# Patient Record
Sex: FEMALE | Race: WHITE | NOT HISPANIC OR LATINO | Employment: OTHER | URBAN - NONMETROPOLITAN AREA
[De-identification: names, ages, dates, MRNs, and addresses within clinical notes are randomized per-mention and may not be internally consistent; named-entity substitution may affect disease eponyms.]

---

## 2017-03-24 ENCOUNTER — OFFICE VISIT (OUTPATIENT)
Dept: MEDICAL GROUP | Facility: PHYSICIAN GROUP | Age: 67
End: 2017-03-24

## 2017-03-24 VITALS
DIASTOLIC BLOOD PRESSURE: 90 MMHG | OXYGEN SATURATION: 96 % | HEART RATE: 78 BPM | BODY MASS INDEX: 29.88 KG/M2 | HEIGHT: 64 IN | WEIGHT: 175 LBS | SYSTOLIC BLOOD PRESSURE: 126 MMHG | TEMPERATURE: 98.4 F | RESPIRATION RATE: 18 BRPM

## 2017-03-24 DIAGNOSIS — Z12.11 COLON CANCER SCREENING: ICD-10-CM

## 2017-03-24 DIAGNOSIS — E66.9 OBESITY (BMI 30-39.9): ICD-10-CM

## 2017-03-24 DIAGNOSIS — Z00.00 HEALTH CARE MAINTENANCE: ICD-10-CM

## 2017-03-24 DIAGNOSIS — I25.2 HISTORY OF MI (MYOCARDIAL INFARCTION): ICD-10-CM

## 2017-03-24 DIAGNOSIS — I10 ESSENTIAL HYPERTENSION: ICD-10-CM

## 2017-03-24 DIAGNOSIS — Z12.31 ENCOUNTER FOR SCREENING MAMMOGRAM FOR BREAST CANCER: ICD-10-CM

## 2017-03-24 DIAGNOSIS — E78.2 MIXED HYPERLIPIDEMIA: ICD-10-CM

## 2017-03-24 PROCEDURE — 99214 OFFICE O/P EST MOD 30 MIN: CPT | Performed by: NURSE PRACTITIONER

## 2017-03-24 RX ORDER — SIMVASTATIN 80 MG
80 TABLET ORAL DAILY
Qty: 90 TAB | Refills: 3 | Status: SHIPPED | OUTPATIENT
Start: 2017-03-24 | End: 2018-04-10 | Stop reason: SDUPTHER

## 2017-03-24 RX ORDER — BISOPROLOL FUMARATE AND HYDROCHLOROTHIAZIDE 10; 6.25 MG/1; MG/1
1 TABLET ORAL DAILY
Qty: 90 TAB | Refills: 3 | Status: SHIPPED | OUTPATIENT
Start: 2017-03-24 | End: 2018-04-10 | Stop reason: SDUPTHER

## 2017-03-24 ASSESSMENT — PATIENT HEALTH QUESTIONNAIRE - PHQ9: CLINICAL INTERPRETATION OF PHQ2 SCORE: 0

## 2017-03-24 ASSESSMENT — PAIN SCALES - GENERAL: PAINLEVEL: NO PAIN

## 2017-03-24 NOTE — MR AVS SNAPSHOT
"Vane Ritter   3/24/2017 11:20 AM   Office Visit   MRN: [id number]    Department:  [facility]   Dept Phone:  [phone]    Description:  Female : 1950   Provider:  FUAD Eller           Reason for Visit     New Patient est care       Allergies as of 3/24/2017     Allergen Noted Reactions    Lisinopril 2017   Cough      You were diagnosed with     Essential hypertension   [0036956]       Mixed hyperlipidemia   [272.2.ICD-9-CM]       Obesity (BMI 30-39.9)   [803206]       Health care maintenance   [623216]       Encounter for screening mammogram for breast cancer   [4049373]       Colon cancer screening   [061661]       History of MI (myocardial infarction)   [399540]         Vital Signs     Blood Pressure Pulse Temperature Respirations Height Weight    126/90 mmHg 78 36.9 °C (98.4 °F) 18 1.626 m (5' 4\") 79.379 kg (175 lb)    Body Mass Index Oxygen Saturation Smoking Status             30.02 kg/m2 96% Current Some Day Smoker         Basic Information     Date Of Birth Sex Race Ethnicity Preferred Language    1950 Female White Non- English      Problem List              ICD-10-CM Priority Class Noted - Resolved    Essential hypertension I10   3/24/2017 - Present    Mixed hyperlipidemia E78.2   3/24/2017 - Present    Obesity (BMI 30-39.9) E66.9   3/24/2017 - Present    Health care maintenance Z00.00   3/24/2017 - Present    History of MI (myocardial infarction) I25.2   3/24/2017 - Present      Health Maintenance        Date Due Completion Dates    IMM DTaP/Tdap/Td Vaccine (1 - Tdap) 1971 ---    PAP SMEAR 1973 ---    MAMMOGRAM 1992 ---    COLONOSCOPY 2002 ---    IMM ZOSTER VACCINE 2012 ---    IMM INFLUENZA (1) 2016 ---            Current Immunizations     No immunizations on file.      Below and/or attached are the medications your provider expects you to take. Review all of your home medications and newly ordered medications " with your provider and/or pharmacist. Follow medication instructions as directed by your provider and/or pharmacist. Please keep your medication list with you and share with your provider. Update the information when medications are discontinued, doses are changed, or new medications (including over-the-counter products) are added; and carry medication information at all times in the event of emergency situations     Allergies:  LISINOPRIL - Cough               Medications  Valid as of: March 24, 2017 - 11:50 AM    Generic Name Brand Name Tablet Size Instructions for use    Albuterol Sulfate (Aero Soln) albuterol 108 (90 BASE) MCG/ACT Inhale 2 Puffs by mouth every four hours as needed for Shortness of Breath.        Aspirin (Tab)  MG Take 325 mg by mouth every 6 hours as needed.        Bisoprolol-Hydrochlorothiazide (Tab) ZIAC 10-6.25 MG Take 1 Tab by mouth every day.        Diclofenac Sodium (Tablet Delayed Response) VOLTAREN 75 MG Take 1 Tab by mouth 2 times a day.        Diclofenac Sodium (Gel) Diclofenac Sodium 1 % Apply 2 g to skin as directed 4 times a day.        Simvastatin (Tab) ZOCOR 80 MG Take 1 Tab by mouth every day.        .                 Medicines prescribed today were sent to:     [facility] [address] [place] [address] [address] [facility]    [address] [facility] [address]    Phone: [phone] Fax: [phone]    Open 24 Hours?: No      Medication refill instructions:       If your prescription bottle indicates you have medication refills left, it is not necessary to call your provider’s office. Please contact your pharmacy and they will refill your medication.    If your prescription bottle indicates you do not have any refills left, you may request refills at any time through one of the following ways: The online [employer] system (except Urgent Care), by calling your provider’s office, or by asking your pharmacy to contact your provider’s office with a refill request.  Medication refills are processed only during regular business hours and may not be available until the next business day. Your provider may request additional information or to have a follow-up visit with you prior to refilling your medication.   *Please Note: Medication refills are assigned a new Rx number when refilled electronically. Your pharmacy may indicate that no refills were authorized even though a new prescription for the same medication is available at the pharmacy. Please request the medicine by name with the pharmacy before contacting your provider for a refill.        Your To Do List     Future Labs/Procedures Complete By Expires    MA-SCREEN MAMMO W/CAD-BILAT  9/20/2017 3/24/2018    OCCULT BLOOD FECES IMMUNOASSAY  9/20/2017 3/25/2018    COMP METABOLIC PANEL  As directed 3/25/2018    LIPID PROFILE  As directed 3/25/2018         Sendio Access Code: [email or web address]  Expires: 4/23/2017 11:50 AM    Your email address is not on file at [facility].  Email Addresses are required for you to sign up for [employer], please contact [phone] to verify your personal information and to provide your email address prior to attempting to register for Sendio.    Vane Ritter  [address] [place] [id number]    Sendio  A secure, online tool to manage your health information     [employer]’s Sendio® is a secure, online tool that connects you to your personalized health information from the privacy of your home -- day or night - making it very easy for you to manage your healthcare. Once the activation process is completed, you can even access your medical information using the Sendio frieda, which is available for free in the Apple [employer] store or Google Play store.     To learn more about Sendio, visit [email or web address]    There are two levels of access available (as shown below):   My Chart Features  [facility] Doctor [facility]  Specialists [facility]  Urgent  Care Non-[facility]  Doctor   Email your healthcare team securely and privately 24/7 X X X    Manage appointments: schedule your next appointment; view details of past/upcoming appointments X      Request prescription refills. X      View recent personal medical records, including lab and immunizations X X X X   View health record, including health history, allergies, medications X X X X   Read reports about your outpatient visits, procedures, consult and ER notes X X X X   See your discharge summary, which is a recap of your hospital and/or ER visit that includes your diagnosis, lab results, and care plan X X  X     How to register for [employer]:  Once your e-mail address has been verified, follow the following steps to sign up for [employer].     1. Go to  [email or web address]  2. Click on the Sign Up Now box, which takes you to the New Member Sign Up page. You will need to provide the following information:  a. Enter your [employer] Access Code exactly as it appears at the top of this page. (You will not need to use this code after you’ve completed the sign-up process. If you do not sign up before the expiration date, you must request a new code.)   b. Enter your date of birth.   c. Enter your home email address.   d. Click Submit, and follow the next screen’s instructions.  3. Create a [employer] ID. This will be your [employer] login ID and cannot be changed, so think of one that is secure and easy to remember.  4. Create a [employer] password. You can change your password at any time.  5. Enter your Password Reset Question and Answer. This can be used at a later time if you forget your password.   6. Enter your e-mail address. This allows you to receive e-mail notifications when new information is available in [employer].  7. Click Sign Up. You can now view your health information.    For assistance activating your [employer] account, call [phone]         Quit Tobacco Information     Do you want to quit using tobacco?    Quitting tobacco  decreases risks of cancer, heart and lung disease, increases life expectancy, improves sense of taste and smell, and increases spending money, among other benefits.    If you are thinking about quitting, we can help.  • [facility] Quit Tobacco Program: 678.600.2723  o Program occurs weekly for four weeks and includes pharmacist consultation on products to support quitting smoking or chewing tobacco. A provider referral is needed for pharmacist consultation.  • Tobacco Users Help Hotline: 1-393-QUIT-NOW (241-6908) or https://[place].quitlogix.org/  o Free, confidential telephone and online coaching for [place] residents. Sessions are designed on a schedule that is convenient for you. Eligible clients receive free nicotine replacement therapy.  • Nationally: www.smokefree.gov  o Information and professional assistance to support both immediate and long-term needs as you become, and remain, a non-smoker. Smokefree.gov allows you to choose the help that best fits your needs.

## 2017-03-24 NOTE — ASSESSMENT & PLAN NOTE
Mammogram last done 2 years ago and normal. Due for colonoscopy, no rectal bleeding, no FH colon cancer   Due for PAP, shingles vaccine, Tdap  Patient does not have insurance, she would like to hold off on any additional screenings/testing until September 2017 as she will have insurance coverage of time.

## 2017-03-24 NOTE — ASSESSMENT & PLAN NOTE
Patient continues on simvastatin 80 mg daily, she denies ever spikes this medication. She is due for fasting labs. She does need a refill today.

## 2017-03-24 NOTE — PROGRESS NOTES
[facility]  Primary Care Office Visit - Problem-Oriented        History:     Vane Ritter is a 66 y.o. female who is here today to discuss New Patient    Essential hypertension  This is a 66-year-old female with hypertension, hyperlipidemia, history of MI requiring stent to RCA in 2009.     She continues on bisoprolol-hydrochlorothiazide 10-6 0.25 mg daily with great control of her blood pressure, her blood pressure is 126/90 in the office today. She denies chest pain, shortness of breath, change in vision, headaches. She denies AE of this medication. She is due for labs.    Health care maintenance  Mammogram last done 2 years ago and normal. Due for colonoscopy, no rectal bleeding, no FH colon cancer   Due for PAP, shingles vaccine, Tdap  Patient does not have insurance, she would like to hold off on any additional screenings/testing until September 2017 as she will have insurance coverage of time.     History of MI (myocardial infarction)  As intended this is a 66-year-old female with history of MI requiring PCI and stent placement to M-RCA in 2009. She continues on daily ASA. She is chest pain-free. She is also on high-dose statin without adverse effect, she is due for fasting labs. She is able to do heavy housework without chest pain, syncope, palpitations. She does exercise regularly. She does not smoke.      Mixed hyperlipidemia  Patient continues on simvastatin 80 mg daily, she denies ever spikes this medication. She is due for fasting labs. She does need a refill today.        Past Medical History   Diagnosis Date   • Heart attack (CMS-HCC)      2009      Past Surgical History   Procedure Laterality Date   • Cyst excision       right ankle, RA nodule    • Stent placement       2009   • Open reduction     • Tubal coagulation laparoscopic bilateral       Social History     Social History   • Marital Status:      Spouse Name: N/A   • Number of Children: N/A   • Years of Education: N/A  "    Occupational History   • Not on file.     Social History Main Topics   • Smoking status: Current Some Day Smoker   • Smokeless tobacco: Never Used   • Alcohol Use: Yes      Comment: rare   • Drug Use: No   • Sexual Activity: Not on file     Other Topics Concern   • Not on file     Social History Narrative     History   Smoking status   • Current Some Day Smoker   Smokeless tobacco   • Never Used     History reviewed. No pertinent family history.  Allergies   Allergen Reactions   • Lisinopril Cough       Problem List:     Patient Active Problem List    Diagnosis Date Noted   • Essential hypertension 03/24/2017   • Mixed hyperlipidemia 03/24/2017   • Obesity (BMI 30-39.9) 03/24/2017   • Health care maintenance 03/24/2017   • History of MI (myocardial infarction) 03/24/2017         Medications:     Current outpatient prescriptions:   •  bisoprolol-hydrochlorothiazide (ZIAC) 10-6.25 MG per tablet, Take 1 Tab by mouth every day., Disp: 90 Tab, Rfl: 3  •  simvastatin (ZOCOR) 80 MG tablet, Take 1 Tab by mouth every day., Disp: 90 Tab, Rfl: 3  •  albuterol (VENTOLIN OR PROVENTIL) 108 (90 BASE) MCG/ACT AERS inhalation aerosol, Inhale 2 Puffs by mouth every four hours as needed for Shortness of Breath., Disp: 1 Inhaler, Rfl: 0  •  aspirin (ASA) 325 MG TABS, Take 325 mg by mouth every 6 hours as needed., Disp: , Rfl:   •  diclofenac EC (VOLTAREN) 75 MG Tablet Delayed Response, Take 1 Tab by mouth 2 times a day., Disp: 60 Tab, Rfl: 3  •  Diclofenac Sodium (VOLTAREN) 1 % Gel, Apply 2 g to skin as directed 4 times a day., Disp: 100 g, Rfl: 0      Review of Systems:     Complains of review of systems negative.    Physical Assessment:     VS: /90 mmHg  Pulse 78  Temp(Src) 36.9 °C (98.4 °F)  Resp 18  Ht 1.626 m (5' 4\")  Wt 79.379 kg (175 lb)  BMI 30.02 kg/m2  SpO2 96%    General: Well-developed, well-nourished, female     Head: PERRL, EOMI. Normocephalic. No facial asymmetry noted.  Cardiovasc:No JVD.  RRR, no MRG. " No thrills or bruits. Pulses 2+ and symmetric at all distal extremities.  Pulmonary: Lungs clear bilaterally.  Normal respiratory effort. No wheeze or crackles.   Abdomen: Abdomen soft, NT, ND, NAB.  No masses or organomegaly.  Extremities: No edema, no TTP bilateral calves. Pedal pulses intact. No joint effusions. LEs warm and well-perfused.  Neuro: Alert and oriented, CNs II-XII intact, no focal deficits.  Skin:No rashes noted. Skin warm, dry, intact    Lymph: No cervical, pre- or post-auricular, submandibular, occipital lymphadenopathy.    Psych: Dressed appropriately for the weather, pleasant and conversant.  Affect, mood & judgment appropriate.    Assessment/Plan:   Vane was seen today for new patient.    Diagnoses and all orders for this visit:    Essential hypertension, chronic, controlled on present treatment  -     COMP METABOLIC PANEL; Future  -     bisoprolol-hydrochlorothiazide (ZIAC) 10-6.25 MG per tablet; Take 1 Tab by mouth every day.    Mixed hyperlipidemia, chronic, unclear control   -     LIPID PROFILE; Future  -     simvastatin (ZOCOR) 80 MG tablet; Take 1 Tab by mouth every day.    Obesity (BMI 30-39.9)  -     Patient identified as having weight management issue.  Appropriate orders and counseling given.    Encounter for screening mammogram for breast cancer  -     MA-SCREEN MAMMO W/CAD-BILAT; Future    Colon cancer screening  -     OCCULT BLOOD FECES IMMUNOASSAY; Future    History of MI (myocardial infarction)  - patient remains CP free, she continues on daily ASA 81mg    Patient is agreeable to the above plan and voiced understanding. All questions answered.     Please note that this dictation was created using voice recognition software. I have made every reasonable attempt to correct obvious errors, but I expect that there are errors of grammar and possibly content that I did not discover before finalizing the note.      JUAN RAMON Garcia  3/24/2017, 11:51 AM

## 2017-03-24 NOTE — ASSESSMENT & PLAN NOTE
As intended this is a 66-year-old female with history of MI requiring PCI and stent placement to M-RCA in 2009. She continues on daily ASA. She is chest pain-free. She is also on high-dose statin without adverse effect, she is due for fasting labs. She is able to do heavy housework without chest pain, syncope, palpitations. She does exercise regularly. She does not smoke.

## 2017-03-24 NOTE — ASSESSMENT & PLAN NOTE
This is a 66-year-old female with hypertension, hyperlipidemia, history of MI requiring stent to RCA in 2009.     She continues on bisoprolol-hydrochlorothiazide 10-6 0.25 mg daily with great control of her blood pressure, her blood pressure is 126/90 in the office today. She denies chest pain, shortness of breath, change in vision, headaches. She denies AE of this medication. She is due for labs.

## 2018-04-10 ENCOUNTER — OFFICE VISIT (OUTPATIENT)
Dept: MEDICAL GROUP | Facility: PHYSICIAN GROUP | Age: 68
End: 2018-04-10
Payer: MEDICARE

## 2018-04-10 VITALS
OXYGEN SATURATION: 97 % | HEIGHT: 64 IN | RESPIRATION RATE: 16 BRPM | SYSTOLIC BLOOD PRESSURE: 128 MMHG | WEIGHT: 180 LBS | BODY MASS INDEX: 30.73 KG/M2 | HEART RATE: 76 BPM | TEMPERATURE: 97.8 F | DIASTOLIC BLOOD PRESSURE: 90 MMHG

## 2018-04-10 DIAGNOSIS — J45.20 MILD INTERMITTENT ASTHMA WITHOUT COMPLICATION: ICD-10-CM

## 2018-04-10 DIAGNOSIS — Z12.11 SCREEN FOR COLON CANCER: ICD-10-CM

## 2018-04-10 DIAGNOSIS — F41.9 ANXIETY: ICD-10-CM

## 2018-04-10 DIAGNOSIS — Z12.39 SCREENING FOR BREAST CANCER: ICD-10-CM

## 2018-04-10 DIAGNOSIS — Z13.21 ENCOUNTER FOR VITAMIN DEFICIENCY SCREENING: ICD-10-CM

## 2018-04-10 DIAGNOSIS — E78.2 MIXED HYPERLIPIDEMIA: ICD-10-CM

## 2018-04-10 DIAGNOSIS — R06.2 WHEEZE: ICD-10-CM

## 2018-04-10 DIAGNOSIS — I10 ESSENTIAL HYPERTENSION: ICD-10-CM

## 2018-04-10 PROBLEM — Z00.00 HEALTH CARE MAINTENANCE: Status: RESOLVED | Noted: 2017-03-24 | Resolved: 2018-04-10

## 2018-04-10 PROCEDURE — 99214 OFFICE O/P EST MOD 30 MIN: CPT | Performed by: NURSE PRACTITIONER

## 2018-04-10 RX ORDER — ESCITALOPRAM OXALATE 10 MG/1
10 TABLET ORAL DAILY
Qty: 90 TAB | Refills: 0 | Status: SHIPPED | OUTPATIENT
Start: 2018-04-10 | End: 2018-06-14 | Stop reason: SDUPTHER

## 2018-04-10 RX ORDER — BISOPROLOL FUMARATE AND HYDROCHLOROTHIAZIDE 10; 6.25 MG/1; MG/1
TABLET ORAL
Qty: 135 TAB | Refills: 0 | Status: SHIPPED | OUTPATIENT
Start: 2018-04-10 | End: 2018-06-14 | Stop reason: SDUPTHER

## 2018-04-10 RX ORDER — ALBUTEROL SULFATE 90 UG/1
2 AEROSOL, METERED RESPIRATORY (INHALATION) EVERY 4 HOURS PRN
Qty: 1 INHALER | Refills: 0 | Status: SHIPPED | OUTPATIENT
Start: 2018-04-10 | End: 2019-02-06 | Stop reason: CLARIF

## 2018-04-10 RX ORDER — SIMVASTATIN 80 MG
80 TABLET ORAL DAILY
Qty: 90 TAB | Refills: 0 | Status: SHIPPED | OUTPATIENT
Start: 2018-04-10 | End: 2019-01-16 | Stop reason: CLARIF

## 2018-04-10 ASSESSMENT — PATIENT HEALTH QUESTIONNAIRE - PHQ9: CLINICAL INTERPRETATION OF PHQ2 SCORE: 0

## 2018-04-10 NOTE — ASSESSMENT & PLAN NOTE
Patient reports that she gets anxious and irritable in caring for her invalid .  He is requiring multiple trips to [place] for care and is not always compliant.  Patient reports that she deals with frustration and anxiety by smoking.  She denies depression.  We discussed options such as counseling, caregiver support group, SSRI's, other resources for spouse's care.  Patient reports she has no time for counseling or support group.  She would like to trial SSRI. We reviewed that medication takes 4 to 6 weeks to reach therapeutic effect, and that side effects of headache and nausea are temporary lasting about 2 weeks.  Patient will follow-up in clinic in 6 weeks for anxiety.

## 2018-04-10 NOTE — ASSESSMENT & PLAN NOTE
Patient continues on simvstatin 80 mg daily.  She has not gotten lab work done.  She will get updated lipid profile.  Will refill for now.

## 2018-04-10 NOTE — ASSESSMENT & PLAN NOTE
Patient reports that she uses albuterol inhaler occasionally for cough.  Usually has cough during temperature change.  Last albuterol inhaler lasted 3 years.  Denies current cough, wheezing, dyspnea

## 2018-04-10 NOTE — PROGRESS NOTES
CC:  Hypertension, asthma    HISTORY OF THE PRESENT ILLNESS: Patient is a 67 y.o. female. This pleasant patient is here today for hypertension and intermittent asthma.      Essential hypertension  This is a chronic health problem that is moderately controlled with current medications and lifestyle measures.  Blood pressure today is 120/90.  Patient reports blood pressure readings at home are often 150's/90's.  She takes bisoprolol-hydrochlororthiazide 10-6.25 mg daily.  The patient denies chest pain, shortness of breath, headache, vision changes, or dyspnea on exertion.  She smokes anywhere from 2 cigarettes a day to 1/2 pack a day depending on her stress level.  She will work on smoking cessation.  Will increase medication to 1 and 1/2 tab daily.        Mild intermittent asthma without complication  Patient reports that she uses albuterol inhaler occasionally for cough.  Usually has cough during temperature change.  Last albuterol inhaler lasted 3 years.  Denies current cough, wheezing, dyspnea    Mixed hyperlipidemia  Patient continues on simvstatin 80 mg daily.  She has not gotten lab work done.  She will get updated lipid profile.  Will refill for now.    Anxiety  Patient reports that she gets anxious and irritable in caring for her invalid .  He is requiring multiple trips to [place] for care and is not always compliant.  Patient reports that she deals with frustration and anxiety by smoking.  She denies depression.  We discussed options such as counseling, caregiver support group, SSRI's, other resources for spouse's care.  Patient reports she has no time for counseling or support group.  She would like to trial SSRI. We reviewed that medication takes 4 to 6 weeks to reach therapeutic effect, and that side effects of headache and nausea are temporary lasting about 2 weeks.  Patient will follow-up in clinic in 6 weeks for anxiety.      Allergies: Lisinopril    Current Outpatient Prescriptions Ordered in  Epic   Medication Sig Dispense Refill   • bisoprolol-hydrochlorothiazide (ZIAC) 10-6.25 MG per tablet Take 1 and 1/2 tab daily 135 Tab 0   • albuterol 108 (90 Base) MCG/ACT Aero Soln inhalation aerosol Inhale 2 Puffs by mouth every four hours as needed for Shortness of Breath. 1 Inhaler 0   • escitalopram (LEXAPRO) 10 MG Tab Take 1 Tab by mouth every day. 90 Tab 0   • simvastatin (ZOCOR) 80 MG tablet Take 1 Tab by mouth every day. 90 Tab 0   • aspirin (ASA) 325 MG TABS Take 325 mg by mouth every 6 hours as needed.     • diclofenac EC (VOLTAREN) 75 MG Tablet Delayed Response Take 1 Tab by mouth 2 times a day. 60 Tab 3   • Diclofenac Sodium (VOLTAREN) 1 % Gel Apply 2 g to skin as directed 4 times a day. 100 g 0     No current Epic-ordered facility-administered medications on file.        Past Medical History:   Diagnosis Date   • Heart attack     2009        Past Surgical History:   Procedure Laterality Date   • CYST EXCISION      right ankle, RA nodule    • OPEN REDUCTION     • STENT PLACEMENT      2009   • TUBAL COAGULATION LAPAROSCOPIC BILATERAL         Social History   Substance Use Topics   • Smoking status: Current Some Day Smoker     Packs/day: 0.50     Types: Cigarettes   • Smokeless tobacco: Never Used   • Alcohol use Yes      Comment: rare       No family history on file.    ROS:     - Constitutional: Negative for fever, chills, unexpected weight change, and fatigue/generalized weakness.     - HEENT: Negative for headaches, vision changes, hearing changes, ear pain, rhinorrhea, sinus congestion, and sore throat.        - Cardiovascular: Negative for chest pain and bilateral lower extremity edema.     - Gastrointestinal: Negative for abdominal pain, melena, diarrhea, constipation.     - Genitourinary: Negative for dysuria.    - Skin: Negative for rash.     - Neurological: Negative for dizziness.       - Psychiatric/Behavioral: Negative for depression, suicidal/homicidal ideation.           Exam: Blood  "pressure 128/90, pulse 76, temperature 36.6 °C (97.8 °F), resp. rate 16, height 1.626 m (5' 4\"), weight 81.6 kg (180 lb), SpO2 97 %. Body mass index is 30.9 kg/m².    General: Alert, pleasant, well nourished, well developed female in NAD  HEENT: Normocephalic. Eyes conjunctiva clear lids without ptosis, pupils equal and reactive to light, ears normal shape and contour, canals are clear bilaterally, tympanic membranes are pearly gray with good light reflex, nasal mucosa without erythema and drainage, oropharynx is without erythema, edema or exudates.   Neck: Supple without bruit. Thyroid is not enlarged.  Pulmonary: Clear to ausculation.  Normal effort. No rales, ronchi, or wheezing.  Cardiovascular: Normal rate and rhythm without murmur. Carotid and radial pulses are intact and equal bilaterally.  No lower extremity edema.  Abdomen: Soft, nontender, mildly distended. Normal bowel sounds. Liver and spleen are not palpable  Neurologic: Grossly nonfocal  Lymph: No cervical or supraclavicular lymph nodes are palpable  Skin: Warm and dry.  No obvious lesions.  Musculoskeletal: Normal gait.   Psych: Normal mood and affect. Alert and oriented. Judgment and insight is normal.    Please note that this dictation was created using voice recognition software. I have made every reasonable attempt to correct obvious errors, but I expect that there are errors of grammar and possibly content that I did not discover before finalizing the note.      Assessment/Plan  1. Essential hypertension  Increase medication dose.  Patient will continue to monitor blood pressure at home.  Will bring in readings to next appointment.  Will review lab results at next appointment.  - bisoprolol-hydrochlorothiazide (ZIAC) 10-6.25 MG per tablet; Take 1 and 1/2 tab daily  Dispense: 135 Tab; Refill: 0  - COMP METABOLIC PANEL; Future  - VITAMIN D,25 HYDROXY; Future  - TSH; Future  - FREE THYROXINE; Future  - ESTIMATED GFR; Future    2. Mild intermittent " asthma without complication    - albuterol 108 (90 Base) MCG/ACT Aero Soln inhalation aerosol; Inhale 2 Puffs by mouth every four hours as needed for Shortness of Breath.  Dispense: 1 Inhaler; Refill: 0    3. Mixed hyperlipidemia  Will review lab results at next appointment.  - LIPID PROFILE; Future  - simvastatin (ZOCOR) 80 MG tablet; Take 1 Tab by mouth every day.  Dispense: 90 Tab; Refill: 0    4. Encounter for vitamin deficiency screening  - VITAMIN D,25 HYDROXY; Future    5. Anxiety  Patient will start Lexapro.  Will try to talk with friends at least once a week.  - escitalopram (LEXAPRO) 10 MG Tab; Take 1 Tab by mouth every day.  Dispense: 90 Tab; Refill: 0    6. Screening for breast cancer  Ordered.  - MA-MAMMO SCREENING BILAT W/JEANNE W/CAD; Future    7. Screen for colon cancer  Ordered.  - OCCULT BLOOD FECES IMMUNOASSAY (FIT); Future    Patient will return to clinic in 6 weeks for hypertension and anxiety and to review lab results.

## 2018-04-10 NOTE — ASSESSMENT & PLAN NOTE
This is a chronic health problem that is moderately controlled with current medications and lifestyle measures.  Blood pressure today is 120/90.  Patient reports blood pressure readings at home are often 150's/90's.  She takes bisoprolol-hydrochlororthiazide 10-6.25 mg daily.  The patient denies chest pain, shortness of breath, headache, vision changes, or dyspnea on exertion.  She smokes anywhere from 2 cigarettes a day to 1/2 pack a day depending on her stress level.  She will work on smoking cessation.  Will increase medication to 1 and 1/2 tab daily.

## 2018-06-14 ENCOUNTER — OFFICE VISIT (OUTPATIENT)
Dept: MEDICAL GROUP | Facility: PHYSICIAN GROUP | Age: 68
End: 2018-06-14
Payer: MEDICARE

## 2018-06-14 ENCOUNTER — HOSPITAL ENCOUNTER (OUTPATIENT)
Dept: LAB | Facility: MEDICAL CENTER | Age: 68
End: 2018-06-14
Attending: NURSE PRACTITIONER
Payer: MEDICARE

## 2018-06-14 VITALS
OXYGEN SATURATION: 96 % | HEIGHT: 64 IN | SYSTOLIC BLOOD PRESSURE: 128 MMHG | RESPIRATION RATE: 16 BRPM | HEART RATE: 71 BPM | BODY MASS INDEX: 29.78 KG/M2 | DIASTOLIC BLOOD PRESSURE: 82 MMHG | WEIGHT: 174.4 LBS | TEMPERATURE: 97.6 F

## 2018-06-14 DIAGNOSIS — E78.2 MIXED HYPERLIPIDEMIA: ICD-10-CM

## 2018-06-14 DIAGNOSIS — L82.1 SEBORRHEIC KERATOSES: ICD-10-CM

## 2018-06-14 DIAGNOSIS — M25.561 CHRONIC PAIN OF RIGHT KNEE: ICD-10-CM

## 2018-06-14 DIAGNOSIS — Z23 NEED FOR TDAP VACCINATION: ICD-10-CM

## 2018-06-14 DIAGNOSIS — M75.02 ADHESIVE CAPSULITIS OF LEFT SHOULDER: ICD-10-CM

## 2018-06-14 DIAGNOSIS — F41.9 ANXIETY: ICD-10-CM

## 2018-06-14 DIAGNOSIS — G89.29 CHRONIC PAIN OF RIGHT KNEE: ICD-10-CM

## 2018-06-14 DIAGNOSIS — L98.9 SKIN LESION: ICD-10-CM

## 2018-06-14 DIAGNOSIS — I10 ESSENTIAL HYPERTENSION: ICD-10-CM

## 2018-06-14 DIAGNOSIS — Z13.21 ENCOUNTER FOR VITAMIN DEFICIENCY SCREENING: ICD-10-CM

## 2018-06-14 PROBLEM — L57.0 ACTINIC KERATOSIS: Status: ACTIVE | Noted: 2018-06-14

## 2018-06-14 PROBLEM — E66.3 OVERWEIGHT WITH BODY MASS INDEX (BMI) 25.0-29.9: Status: ACTIVE | Noted: 2017-03-24

## 2018-06-14 LAB
25(OH)D3 SERPL-MCNC: 29 NG/ML (ref 30–100)
ALBUMIN SERPL BCP-MCNC: 4.1 G/DL (ref 3.2–4.9)
ALBUMIN/GLOB SERPL: 1 G/DL
ALP SERPL-CCNC: 90 U/L (ref 30–99)
ALT SERPL-CCNC: 12 U/L (ref 2–50)
ANION GAP SERPL CALC-SCNC: 6 MMOL/L (ref 0–11.9)
AST SERPL-CCNC: 13 U/L (ref 12–45)
BILIRUB SERPL-MCNC: 0.4 MG/DL (ref 0.1–1.5)
BUN SERPL-MCNC: 18 MG/DL (ref 8–22)
CALCIUM SERPL-MCNC: 9.4 MG/DL (ref 8.5–10.5)
CHLORIDE SERPL-SCNC: 104 MMOL/L (ref 96–112)
CHOLEST SERPL-MCNC: 223 MG/DL (ref 100–199)
CO2 SERPL-SCNC: 27 MMOL/L (ref 20–33)
CREAT SERPL-MCNC: 0.83 MG/DL (ref 0.5–1.4)
GLOBULIN SER CALC-MCNC: 4.1 G/DL (ref 1.9–3.5)
GLUCOSE SERPL-MCNC: 102 MG/DL (ref 65–99)
HDLC SERPL-MCNC: 33 MG/DL
LDLC SERPL CALC-MCNC: 148 MG/DL
POTASSIUM SERPL-SCNC: 4 MMOL/L (ref 3.6–5.5)
PROT SERPL-MCNC: 8.2 G/DL (ref 6–8.2)
SODIUM SERPL-SCNC: 137 MMOL/L (ref 135–145)
T4 FREE SERPL-MCNC: 1.01 NG/DL (ref 0.53–1.43)
TRIGL SERPL-MCNC: 209 MG/DL (ref 0–149)
TSH SERPL DL<=0.005 MIU/L-ACNC: 2.22 UIU/ML (ref 0.38–5.33)

## 2018-06-14 PROCEDURE — 17110 DESTRUCTION B9 LES UP TO 14: CPT | Performed by: NURSE PRACTITIONER

## 2018-06-14 PROCEDURE — 84443 ASSAY THYROID STIM HORMONE: CPT

## 2018-06-14 PROCEDURE — 90471 IMMUNIZATION ADMIN: CPT | Performed by: NURSE PRACTITIONER

## 2018-06-14 PROCEDURE — 84439 ASSAY OF FREE THYROXINE: CPT

## 2018-06-14 PROCEDURE — 82306 VITAMIN D 25 HYDROXY: CPT | Mod: GA

## 2018-06-14 PROCEDURE — 99214 OFFICE O/P EST MOD 30 MIN: CPT | Mod: 25 | Performed by: NURSE PRACTITIONER

## 2018-06-14 PROCEDURE — 36415 COLL VENOUS BLD VENIPUNCTURE: CPT

## 2018-06-14 PROCEDURE — 90715 TDAP VACCINE 7 YRS/> IM: CPT | Performed by: NURSE PRACTITIONER

## 2018-06-14 PROCEDURE — 80053 COMPREHEN METABOLIC PANEL: CPT

## 2018-06-14 PROCEDURE — 80061 LIPID PANEL: CPT

## 2018-06-14 RX ORDER — BISOPROLOL FUMARATE AND HYDROCHLOROTHIAZIDE 10; 6.25 MG/1; MG/1
TABLET ORAL
Qty: 135 TAB | Refills: 0 | Status: SHIPPED | OUTPATIENT
Start: 2018-06-14 | End: 2018-10-17 | Stop reason: SDUPTHER

## 2018-06-14 RX ORDER — ESCITALOPRAM OXALATE 20 MG/1
10 TABLET ORAL DAILY
Qty: 90 TAB | Refills: 1 | Status: SHIPPED | OUTPATIENT
Start: 2018-06-14 | End: 2018-06-15 | Stop reason: SDUPTHER

## 2018-06-14 NOTE — ASSESSMENT & PLAN NOTE
Patient started escitalopram 10 mg 8 weeks ago for anxiety.  She reports that she has noted a mild difference, uncertain if it is from the medication.  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  Patient denies any side effects.  We discussed increasing dose to 20 mg.  Patient is agreeable to dose increase.  Will prescribe escitalopram 20 mg daily.

## 2018-06-14 NOTE — ASSESSMENT & PLAN NOTE
Patient reports lesions on her back that are brown and raised. They are getting irritated because under bra strap and are pruritic. She reports similar lesions in the past that she had frozen off about 10 years ago.  Patient denies bleeding, erythema, swelling, fever.  She is requesting cryotherapy today.  Upon exam, she has six lesions on her back consistent with seborrheic keratoses.  Will proceed with cryotherapy.

## 2018-06-14 NOTE — ASSESSMENT & PLAN NOTE
This is a chronic problem for patient that is well controlled with bisoprolol-hydrochlorothiazide 10-6.25 one and 1/2 tabs daily.  She increased dose after last appointment and blood pressure reading is improved today.  BP today is 128/82.  The patient denies chest pain, shortness of breath or dyspnea on exertion.  Tolerating medication, denies lightheadedness or cough.

## 2018-06-14 NOTE — ASSESSMENT & PLAN NOTE
Patient reports chronic right knee pain.  Will flare up every once in a while.  Applies diclofenac gel occasionally if needed.  Last refilled gel script 4 years ago.  Asking for refill today.

## 2018-06-15 ENCOUNTER — TELEPHONE (OUTPATIENT)
Dept: MEDICAL GROUP | Facility: PHYSICIAN GROUP | Age: 68
End: 2018-06-15

## 2018-06-15 DIAGNOSIS — F41.9 ANXIETY: ICD-10-CM

## 2018-06-15 RX ORDER — ESCITALOPRAM OXALATE 20 MG/1
20 TABLET ORAL DAILY
Qty: 90 TAB | Refills: 1 | Status: SHIPPED | OUTPATIENT
Start: 2018-06-15 | End: 2018-12-13

## 2018-07-18 ENCOUNTER — HOSPITAL ENCOUNTER (OUTPATIENT)
Dept: RADIOLOGY | Facility: MEDICAL CENTER | Age: 68
End: 2018-07-18
Attending: NURSE PRACTITIONER
Payer: MEDICARE

## 2018-07-18 DIAGNOSIS — Z12.39 SCREENING FOR BREAST CANCER: ICD-10-CM

## 2018-07-18 PROCEDURE — 77067 SCR MAMMO BI INCL CAD: CPT

## 2018-07-23 ENCOUNTER — HOSPITAL ENCOUNTER (OUTPATIENT)
Dept: RADIOLOGY | Facility: MEDICAL CENTER | Age: 68
End: 2018-07-23

## 2018-08-31 ENCOUNTER — TELEPHONE (OUTPATIENT)
Dept: URGENT CARE | Facility: PHYSICIAN GROUP | Age: 68
End: 2018-08-31

## 2018-08-31 NOTE — TELEPHONE ENCOUNTER
Patient was charged for vaccine TDAP shot for $77.00. Patient stated that she did not received them back in June 14th and would like for this to be resolved. Looked on patient chart it was noted that she was given but patient denies of shot being done.   Please advise thank you.

## 2018-09-06 ENCOUNTER — TELEPHONE (OUTPATIENT)
Dept: MEDICAL GROUP | Facility: PHYSICIAN GROUP | Age: 68
End: 2018-09-06

## 2018-09-06 NOTE — TELEPHONE ENCOUNTER
Not certain what next step is. It is documented that injection was given.  Shital, please discuss with patient.

## 2018-09-06 NOTE — TELEPHONE ENCOUNTER
----- Message from Kate Aguilera sent at 9/6/2018  7:42 AM PDT -----  Regarding: TDAP Vaccination Confirmation  Good Morning, Janett,    This patient was seen on 6/14/18 and charges were dropped for a TDAP vaccination but the patient does not believe she received it and would like confirmation of its administration.  Please confirm this was given.    Much appreciated,    Monica

## 2018-10-17 DIAGNOSIS — I10 ESSENTIAL HYPERTENSION: ICD-10-CM

## 2018-10-18 RX ORDER — BISOPROLOL FUMARATE AND HYDROCHLOROTHIAZIDE 10; 6.25 MG/1; MG/1
TABLET ORAL
Qty: 135 TAB | Refills: 0 | Status: SHIPPED | OUTPATIENT
Start: 2018-10-18 | End: 2018-12-13 | Stop reason: CLARIF

## 2018-12-13 ENCOUNTER — OFFICE VISIT (OUTPATIENT)
Dept: MEDICAL GROUP | Facility: PHYSICIAN GROUP | Age: 68
End: 2018-12-13
Payer: MEDICARE

## 2018-12-13 VITALS
SYSTOLIC BLOOD PRESSURE: 130 MMHG | HEART RATE: 64 BPM | RESPIRATION RATE: 16 BRPM | BODY MASS INDEX: 29.53 KG/M2 | WEIGHT: 173 LBS | TEMPERATURE: 98 F | DIASTOLIC BLOOD PRESSURE: 74 MMHG | HEIGHT: 64 IN | OXYGEN SATURATION: 95 %

## 2018-12-13 DIAGNOSIS — M75.02 ADHESIVE CAPSULITIS OF LEFT SHOULDER: ICD-10-CM

## 2018-12-13 DIAGNOSIS — M25.50 MULTIPLE JOINT PAIN: ICD-10-CM

## 2018-12-13 DIAGNOSIS — F41.9 ANXIETY: ICD-10-CM

## 2018-12-13 DIAGNOSIS — E78.2 MIXED HYPERLIPIDEMIA: ICD-10-CM

## 2018-12-13 DIAGNOSIS — I10 ESSENTIAL HYPERTENSION: ICD-10-CM

## 2018-12-13 DIAGNOSIS — Z13.29 SCREENING FOR THYROID DISORDER: ICD-10-CM

## 2018-12-13 DIAGNOSIS — M67.431 GANGLION CYST OF WRIST, RIGHT: ICD-10-CM

## 2018-12-13 DIAGNOSIS — R53.83 FATIGUE, UNSPECIFIED TYPE: ICD-10-CM

## 2018-12-13 DIAGNOSIS — I25.2 HISTORY OF MI (MYOCARDIAL INFARCTION): ICD-10-CM

## 2018-12-13 PROCEDURE — 99214 OFFICE O/P EST MOD 30 MIN: CPT | Performed by: NURSE PRACTITIONER

## 2018-12-13 RX ORDER — HYDROCHLOROTHIAZIDE 12.5 MG/1
12.5 TABLET ORAL DAILY
Qty: 90 TAB | Refills: 1 | Status: SHIPPED | OUTPATIENT
Start: 2018-12-13 | End: 2019-01-16 | Stop reason: CLARIF

## 2018-12-13 RX ORDER — BISOPROLOL FUMARATE 10 MG/1
TABLET, FILM COATED ORAL
Qty: 135 TAB | Refills: 1 | Status: SHIPPED | OUTPATIENT
Start: 2018-12-13 | End: 2019-01-16 | Stop reason: CLARIF

## 2018-12-13 NOTE — ASSESSMENT & PLAN NOTE
This is a chronic health problem that is well controlled with current medications and lifestyle measures.  Taking bisopropolol-hydrochlorothiazide 10-6.25 one and 1/2 tabs daily.  Patient reports her co-pay is now up to $45.  It was previously only $10.  Per epic, coverage would be better if medication was ordered separately.  Patient will discontinue combination medication, and I will order bisoprolol 10 mg 1-1/2 tabs daily, and hydrochlorothiazide 12.5 mg 1 tab daily.  Patient verbalized understanding.  She does not take blood pressures at home.  Blood pressure today is 130/74. The patient denies chest pain, shortness of breath, headache, vision changes, epistaxis, or dyspnea on exertion.

## 2018-12-13 NOTE — ASSESSMENT & PLAN NOTE
Patient reports this is new onset.  Has lump in right wrist that is tender.  Upon examination today, it appears as a ganglion cyst.

## 2018-12-13 NOTE — ASSESSMENT & PLAN NOTE
Patient reports MI requiring PCI and stent placement to [facility] in 2009.  Was seen cardiology in [facility] up until 4 years ago.  Will refer patient to cardiology for evaluation.  Denies chest pain, syncope, palpitations, or dyspnea.  Does not smoke.

## 2018-12-13 NOTE — PROGRESS NOTES
CC:  Anxiety and hypertension    HISTORY OF THE PRESENT ILLNESS: Patient is a 68 y.o. female. This pleasant patient is here today for evaluation management following health problems.    Health Maintenance:   Patient declined flu vaccine, though we reviewed benefits of flu vaccine.  Encouraged him to wash his hands frequently and stay out of environments with people who are ill.        Anxiety  Chronic health problem well managed with lifestyle measures.  Quit taking escitalopram, made her really sleepy. Tried breaking in half, but still slept 12 hours.  Only took medication for 6 weeks.  Felt more awake once stopped medication.  Patient reports since then iImproved mood.  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  Patient does not want to start on medication.  We discussed other options including routine aerobic exercise and counseling.  Patient declines counseling and does plan on joining a gym after the new year.  She reports it will be something she does for herself and not for her .  She visits friends in [place] and [facility] about once a year.  Denies SI/HI.      Essential hypertension  This is a chronic health problem that is well controlled with current medications and lifestyle measures.  Taking bisopropolol-hydrochlorothiazide 10-6.25 one and 1/2 tabs daily.  Patient reports her co-pay is now up to $45.  It was previously only $10.  Per epic, coverage would be better if medication was ordered separately.  Patient will discontinue combination medication, and I will order bisoprolol 10 mg 1-1/2 tabs daily, and hydrochlorothiazide 12.5 mg 1 tab daily.  Patient verbalized understanding.  She does not take blood pressures at home.  Blood pressure today is 130/74. The patient denies chest pain, shortness of breath, headache, vision changes, epistaxis, or dyspnea on exertion.    History of MI (myocardial infarction)  Patient reports MI requiring PCI and stent placement to  [facility] in 2009.  Was seen cardiology in [facility] up until 4 years ago.  Will refer patient to cardiology for evaluation.  Denies chest pain, syncope, palpitations, or dyspnea.  Does not smoke.    Mixed hyperlipidemia  This is a chronic health problem that is uncontrolled with current medications and lifestyle measures.  Takes simvastatin 80 mg daily.  Tolerating medication, denies muscle aches or weakness.  Elevated lipids.  Patient will work on lifestyle measures.  She is planning to start exercising at local gym.  Reviewed low-fat diet.    Component      Latest Ref Rng & Units 6/14/2018          10:49 AM   Cholesterol,Tot      100 - 199 mg/dL 223 (H)   Triglycerides      0 - 149 mg/dL 209 (H)   HDL      >=40 mg/dL 33 (A)   LDL      <100 mg/dL 148 (H)       Multiple joint pain  Patient reports multiple joint pain, mostly in ankles, bilateral hands, and bilateral wrists.  She reports that she had a growth surgically removed from her right ankle for 5 years ago by podiatry and was told that it appeared to be a rheumatoid nodule.  Since then discomfort in joints have worsened.  Will get rheumatoid lab workup.    Ganglion cyst of wrist, right  Patient reports this is new onset.  Has lump in right wrist that is tender.  Upon examination today, it appears as a ganglion cyst.      Allergies: Lisinopril    Current Outpatient Prescriptions Ordered in [facility]   Medication Sig Dispense Refill   • bisoprolol (ZEBETA) 10 MG tablet Take 1 and 1/2 tabs daily. 135 Tab 1   • hydroCHLOROthiazide (HYDRODIURIL) 12.5 MG tablet Take 1 Tab by mouth every day. 90 Tab 1   • Diclofenac Sodium (VOLTAREN) 1 % Gel Apply 2 g to skin as directed 4 times a day. 100 g 0   • albuterol 108 (90 Base) MCG/ACT Aero Soln inhalation aerosol Inhale 2 Puffs by mouth every four hours as needed for Shortness of Breath. 1 Inhaler 0   • simvastatin (ZOCOR) 80 MG tablet Take 1 Tab by mouth every day. 90 Tab 0   • aspirin (ASA) 325 MG TABS Take 325 mg by  "mouth every 6 hours as needed.       No current Epic-ordered facility-administered medications on file.        Past Medical History:   Diagnosis Date   • Heart attack (HCC)     2009        Past Surgical History:   Procedure Laterality Date   • CYST EXCISION      right ankle, RA nodule    • OPEN REDUCTION     • STENT PLACEMENT      2009   • TUBAL COAGULATION LAPAROSCOPIC BILATERAL         Social History   Substance Use Topics   • Smoking status: Current Some Day Smoker     Packs/day: 0.50     Types: Cigarettes   • Smokeless tobacco: Never Used   • Alcohol use Yes      Comment: rare       No family history on file.    ROS:     - Constitutional: Negative for fever, chills, unexpected weight change. Positive fatigue.     - HEENT: Negative for headaches, vision changes, hearing changes, ear pain, rhinorrhea, sinus congestion, and sore throat.      - Respiratory: Negative for cough, dyspnea, and wheezing.      - Cardiovascular: Negative for chest pain, palpitations, and bilateral lower extremity edema.     - Gastrointestinal: Negative for nausea, vomiting, abdominal pain, melena, diarrhea, constipation.     - Genitourinary: Negative for dysuria.    - Musculoskeletal: As in HPI.     - Skin: Negative for rash.     - Neurological: Negative for dizziness, tingling.     - Endo/Heme/Allergies: Does not bruise/bleed easily.     - Psychiatric/Behavioral: As in HPI.             Exam: Blood pressure 130/74, pulse 64, temperature 36.7 °C (98 °F), temperature source Temporal, resp. rate 16, height 1.626 m (5' 4\"), weight 78.5 kg (173 lb), SpO2 95 %. Body mass index is 29.7 kg/m².    General: Alert, pleasant, well nourished, well developed female in NAD  HEENT: Normocephalic. Eyes conjunctiva clear lids without ptosis, pupils equal and reactive to light, ears normal shape and contour, canals are clear bilaterally, tympanic membranes are pearly gray with good light reflex, nasal mucosa without erythema and drainage, oropharynx is " without erythema, edema or exudates.   Neck: Supple without bruit. Thyroid is not enlarged.  Pulmonary: Clear to ausculation.  Normal effort. No rales, ronchi, or wheezing.  Cardiovascular: Normal rate and rhythm without murmur. Carotid and radial pulses are intact and equal bilaterally.  No lower extremity edema.  Abdomen: Soft, nontender, nondistended. Normal bowel sounds. Liver and spleen are not palpable  Neurologic: Grossly nonfocal  Lymph: No cervical or supraclavicular lymph nodes are palpable  Skin: Warm and dry.  No obvious lesions.  Musculoskeletal: Normal gait. Mild joint swelling bilateral wrists, no erythema.  No joint swelling at ankles.  Right wrist with soft 1 cm encapsulated cyst, no erythema, mild tenderness.  Psych: Normal mood and affect. Alert and oriented. Judgment and insight is normal.    Please note that this dictation was created using voice recognition software. I have made every reasonable attempt to correct obvious errors, but I expect that there are errors of grammar and possibly content that I did not discover before finalizing the note.      Assessment/Plan  1. Anxiety  Continue with lifestyle measures.    2. Essential hypertension  Will change medication to individual meds from combination tablet.  ER precautions reviewed with patient.  - COMP METABOLIC PANEL; Future  - ESTIMATED GFR; Future  - CBC WITHOUT DIFFERENTIAL; Future  - bisoprolol (ZEBETA) 10 MG tablet; Take 1 and 1/2 tabs daily.  Dispense: 135 Tab; Refill: 1  - hydroCHLOROthiazide (HYDRODIURIL) 12.5 MG tablet; Take 1 Tab by mouth every day.  Dispense: 90 Tab; Refill: 1    3. Adhesive capsulitis of left shoulder  Patient asking for refill of diclofenac gel.  - Diclofenac Sodium (VOLTAREN) 1 % Gel; Apply 2 g to skin as directed 4 times a day.  Dispense: 100 g; Refill: 0    4. History of MI (myocardial infarction)  We will refer to cardiology for evaluation.  - REFERRAL TO CARDIOLOGY    5. Mixed hyperlipidemia  Continue with  medication and work on lifestyle measures.  - Lipid Profile; Future    6. Screening for thyroid disorder    - TSH; Future  - FREE THYROXINE; Future    7. Fatigue, unspecified type    - CBC WITHOUT DIFFERENTIAL; Future    8. Multiple joint pain    - RHEUMATOID ARTHRITIS FACTOR; Future  - WESTERGREN SED RATE; Future  - CCP ANTIBODY; Future  - CREATINE KINASE; Future  - CRP QUANTITIVE (NON-CARDIAC); Future    9. Ganglion cyst of wrist, right  Patient will continue to monitor.  She has not wanting referral to hand surgery at this time.  I did advise her that this could resolve on its own.  If becomes extremely tender or bothersome, patient will let me know and I will refer to orthopedics.    Patient will return to clinic in 6 months for lab review and follow-up on chronic health problems.  She will return to clinic sooner if needed.

## 2018-12-13 NOTE — ASSESSMENT & PLAN NOTE
This is a chronic health problem that is uncontrolled with current medications and lifestyle measures.  Takes simvastatin 80 mg daily.  Tolerating medication, denies muscle aches or weakness.  Elevated lipids.  Patient will work on lifestyle measures.  She is planning to start exercising at local gym.  Reviewed low-fat diet.    Component      Latest Ref Rng & Units 6/14/2018          10:49 AM   Cholesterol,Tot      100 - 199 mg/dL 223 (H)   Triglycerides      0 - 149 mg/dL 209 (H)   HDL      >=40 mg/dL 33 (A)   LDL      <100 mg/dL 148 (H)

## 2018-12-13 NOTE — ASSESSMENT & PLAN NOTE
Patient reports multiple joint pain, mostly in ankles, bilateral hands, and bilateral wrists.  She reports that she had a growth surgically removed from her right ankle for 5 years ago by podiatry and was told that it appeared to be a rheumatoid nodule.  Since then discomfort in joints have worsened.  Will get rheumatoid lab workup.

## 2018-12-13 NOTE — ASSESSMENT & PLAN NOTE
Chronic health problem well managed with lifestyle measures.  Quit taking escitalopram, made her really sleepy. Tried breaking in half, but still slept 12 hours.  Only took medication for 6 weeks.  Felt more awake once stopped medication.  Patient reports since then iImproved mood.  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  Patient does not want to start on medication.  We discussed other options including routine aerobic exercise and counseling.  Patient declines counseling and does plan on joining a gym after the new year.  She reports it will be something she does for herself and not for her .  She visits friends in [place] and [facility] about once a year.  Denies SI/HI.

## 2018-12-21 ENCOUNTER — TELEPHONE (OUTPATIENT)
Dept: MEDICAL GROUP | Facility: PHYSICIAN GROUP | Age: 68
End: 2018-12-21

## 2018-12-21 NOTE — TELEPHONE ENCOUNTER
MEDICATION PRIOR AUTHORIZATION NEEDED:    1. Name of Medication: diclofenac 1% gel    2. Requested By (Name of Pharmacy): [facility]      3. Is insurance on file current? yes    4. What is the name & phone number of the 3rd party payor?  covermymeds  Key: [id number]  Member ID: [id number]  Group: [id number] LI #: [phone]

## 2019-01-06 ENCOUNTER — OFFICE VISIT (OUTPATIENT)
Dept: URGENT CARE | Facility: PHYSICIAN GROUP | Age: 69
End: 2019-01-06
Payer: MEDICARE

## 2019-01-06 VITALS
HEART RATE: 74 BPM | HEIGHT: 64 IN | BODY MASS INDEX: 29.88 KG/M2 | WEIGHT: 175 LBS | TEMPERATURE: 98.4 F | SYSTOLIC BLOOD PRESSURE: 120 MMHG | DIASTOLIC BLOOD PRESSURE: 80 MMHG | OXYGEN SATURATION: 94 % | RESPIRATION RATE: 16 BRPM

## 2019-01-06 DIAGNOSIS — M25.50 POLYARTHRALGIA: ICD-10-CM

## 2019-01-06 PROCEDURE — 99214 OFFICE O/P EST MOD 30 MIN: CPT | Performed by: NURSE PRACTITIONER

## 2019-01-06 RX ORDER — MELOXICAM 15 MG/1
15 TABLET ORAL DAILY
Qty: 14 TAB | Refills: 0 | Status: SHIPPED | OUTPATIENT
Start: 2019-01-06 | End: 2019-01-16 | Stop reason: CLARIF

## 2019-01-09 ASSESSMENT — ENCOUNTER SYMPTOMS
DIAPHORESIS: 0
FALLS: 0
WEAKNESS: 0
FEVER: 0
CHILLS: 0

## 2019-01-09 NOTE — PROGRESS NOTES
"Subjective:      Vane Ritter is a 68 y.o. female who presents with Generalized Body Aches (joint pain not like the flu) and Arthritis            Patient comes in today with a 3 day history of polyarthralgia with pain in bilateral wrists, bilateral ankles, and right knee. She has a history of multiple joint pain and osteoarthritis.  She has been using diclofenac gel with minimal relief.  No fever, chills, or URI symptoms.  No joint swelling, redness, or heat.  No known trauma or strain.  No numbness, tingling or weakness.  GFR and liver function on CMP unremarkable June 2018.  Patient has orders from PCP for rheumatologic blood work but has not had it drawn yet.         Review of Systems   Constitutional: Negative for chills, diaphoresis, fever and malaise/fatigue.   Musculoskeletal: Positive for joint pain. Negative for falls.   Neurological: Negative for weakness.     Medications, Allergies, and current problem list reviewed today in Epic     Objective:     /80   Pulse 74   Temp 36.9 °C (98.4 °F) (Temporal)   Resp 16   Ht 1.626 m (5' 4\")   Wt 79.4 kg (175 lb)   SpO2 94%   BMI 30.04 kg/m²      Physical Exam   Constitutional: She is oriented to person, place, and time. She appears well-developed and well-nourished. No distress.   Cardiovascular: Normal rate, regular rhythm and normal heart sounds.    Pulmonary/Chest: Effort normal and breath sounds normal. No respiratory distress. She has no wheezes. She has no rales. She exhibits no tenderness.   Musculoskeletal:   Joints are warm, dry, and intact with no bruising, swelling, erythema, rash or lesion.  Diffuse TTP of the bilateral wrists, right knee, and bilateral ankles with ROM intact.  No evidence of traumatic injury.  Sensation and strength intact.  Cap refill < 2 seconds.     Neurological: She is alert and oriented to person, place, and time.   Skin: Skin is warm and dry. Capillary refill takes less than 2 seconds. No rash noted. She is not " diaphoretic. No erythema.   Vitals reviewed.              Assessment/Plan:     1. Polyarthralgia  - meloxicam (MOBIC) 15 MG tablet; Take 1 Tab by mouth every day for 14 days.  Dispense: 14 Tab; Refill: 0    Discussed exam findings with patient.  Differential reviewed.  Trial Mobic as prescribed.  Side effects and precautions discussed.  Ice/heat and rest prn comfort measures.  Follow up with PCP In 2 weeks for re-check, sooner if worse.  Patient verbalized understanding of and agreed with plan of care.

## 2019-01-15 ENCOUNTER — HOSPITAL ENCOUNTER (OUTPATIENT)
Dept: LAB | Facility: MEDICAL CENTER | Age: 69
DRG: 558 | End: 2019-01-15
Attending: NURSE PRACTITIONER
Payer: MEDICARE

## 2019-01-15 DIAGNOSIS — E78.2 MIXED HYPERLIPIDEMIA: ICD-10-CM

## 2019-01-15 DIAGNOSIS — I10 ESSENTIAL HYPERTENSION: ICD-10-CM

## 2019-01-15 DIAGNOSIS — M25.50 MULTIPLE JOINT PAIN: ICD-10-CM

## 2019-01-15 DIAGNOSIS — R53.83 FATIGUE, UNSPECIFIED TYPE: ICD-10-CM

## 2019-01-15 DIAGNOSIS — Z13.29 SCREENING FOR THYROID DISORDER: ICD-10-CM

## 2019-01-15 LAB
ALBUMIN SERPL BCP-MCNC: 2.8 G/DL (ref 3.2–4.9)
ALBUMIN/GLOB SERPL: 0.7 G/DL
ALP SERPL-CCNC: 103 U/L (ref 30–99)
ALT SERPL-CCNC: 134 U/L (ref 2–50)
ANION GAP SERPL CALC-SCNC: 9 MMOL/L (ref 0–11.9)
AST SERPL-CCNC: 141 U/L (ref 12–45)
BILIRUB SERPL-MCNC: 0.3 MG/DL (ref 0.1–1.5)
BUN SERPL-MCNC: 24 MG/DL (ref 8–22)
CALCIUM SERPL-MCNC: 8.9 MG/DL (ref 8.5–10.5)
CHLORIDE SERPL-SCNC: 96 MMOL/L (ref 96–112)
CHOLEST SERPL-MCNC: 216 MG/DL (ref 100–199)
CK SERPL-CCNC: 2306 U/L (ref 0–154)
CO2 SERPL-SCNC: 26 MMOL/L (ref 20–33)
CREAT SERPL-MCNC: 0.87 MG/DL (ref 0.5–1.4)
CRP SERPL HS-MCNC: 5.86 MG/DL (ref 0–0.75)
ERYTHROCYTE [DISTWIDTH] IN BLOOD BY AUTOMATED COUNT: 39.7 FL (ref 35.9–50)
ERYTHROCYTE [SEDIMENTATION RATE] IN BLOOD BY WESTERGREN METHOD: 53 MM/HOUR (ref 0–30)
GLOBULIN SER CALC-MCNC: 3.8 G/DL (ref 1.9–3.5)
GLUCOSE SERPL-MCNC: 88 MG/DL (ref 65–99)
HCT VFR BLD AUTO: 38.3 % (ref 37–47)
HDLC SERPL-MCNC: 22 MG/DL
HGB BLD-MCNC: 13.1 G/DL (ref 12–16)
LDLC SERPL CALC-MCNC: 135 MG/DL
MCH RBC QN AUTO: 27.9 PG (ref 27–33)
MCHC RBC AUTO-ENTMCNC: 34.2 G/DL (ref 33.6–35)
MCV RBC AUTO: 81.7 FL (ref 81.4–97.8)
PLATELET # BLD AUTO: 410 K/UL (ref 164–446)
PMV BLD AUTO: 9 FL (ref 9–12.9)
POTASSIUM SERPL-SCNC: 3.7 MMOL/L (ref 3.6–5.5)
PROT SERPL-MCNC: 6.6 G/DL (ref 6–8.2)
RBC # BLD AUTO: 4.69 M/UL (ref 4.2–5.4)
RHEUMATOID FACT SER IA-ACNC: 10 IU/ML (ref 0–14)
SODIUM SERPL-SCNC: 131 MMOL/L (ref 135–145)
T4 FREE SERPL-MCNC: 1.2 NG/DL (ref 0.53–1.43)
TRIGL SERPL-MCNC: 296 MG/DL (ref 0–149)
TSH SERPL DL<=0.005 MIU/L-ACNC: 1.65 UIU/ML (ref 0.38–5.33)
WBC # BLD AUTO: 13.5 K/UL (ref 4.8–10.8)

## 2019-01-15 PROCEDURE — 85652 RBC SED RATE AUTOMATED: CPT

## 2019-01-15 PROCEDURE — 80061 LIPID PANEL: CPT

## 2019-01-15 PROCEDURE — 80053 COMPREHEN METABOLIC PANEL: CPT

## 2019-01-15 PROCEDURE — 82550 ASSAY OF CK (CPK): CPT

## 2019-01-15 PROCEDURE — 85027 COMPLETE CBC AUTOMATED: CPT

## 2019-01-15 PROCEDURE — 86140 C-REACTIVE PROTEIN: CPT

## 2019-01-15 PROCEDURE — 86200 CCP ANTIBODY: CPT

## 2019-01-15 PROCEDURE — 84439 ASSAY OF FREE THYROXINE: CPT

## 2019-01-15 PROCEDURE — 36415 COLL VENOUS BLD VENIPUNCTURE: CPT

## 2019-01-15 PROCEDURE — 84443 ASSAY THYROID STIM HORMONE: CPT

## 2019-01-15 PROCEDURE — 86431 RHEUMATOID FACTOR QUANT: CPT

## 2019-01-16 ENCOUNTER — TELEPHONE (OUTPATIENT)
Dept: MEDICAL GROUP | Facility: PHYSICIAN GROUP | Age: 69
End: 2019-01-16

## 2019-01-16 ENCOUNTER — HOSPITAL ENCOUNTER (INPATIENT)
Facility: MEDICAL CENTER | Age: 69
LOS: 6 days | DRG: 558 | End: 2019-01-22
Attending: EMERGENCY MEDICINE | Admitting: HOSPITALIST
Payer: MEDICARE

## 2019-01-16 DIAGNOSIS — R74.8 ELEVATED CPK: ICD-10-CM

## 2019-01-16 DIAGNOSIS — E86.0 DEHYDRATION: ICD-10-CM

## 2019-01-16 DIAGNOSIS — R53.1 GENERALIZED WEAKNESS: ICD-10-CM

## 2019-01-16 PROBLEM — R74.01 TRANSAMINITIS: Status: ACTIVE | Noted: 2019-01-16

## 2019-01-16 PROBLEM — M62.82 RHABDOMYOLYSIS DUE TO STATIN THERAPY: Status: ACTIVE | Noted: 2019-01-16

## 2019-01-16 PROBLEM — T46.6X5A RHABDOMYOLYSIS DUE TO STATIN THERAPY: Status: ACTIVE | Noted: 2019-01-16

## 2019-01-16 PROBLEM — E87.1 HYPONATREMIA: Status: ACTIVE | Noted: 2019-01-16

## 2019-01-16 PROBLEM — R79.89 ELEVATED TROPONIN: Status: ACTIVE | Noted: 2019-01-16

## 2019-01-16 LAB
ALBUMIN SERPL BCP-MCNC: 2.8 G/DL (ref 3.2–4.9)
ALBUMIN/GLOB SERPL: 0.7 G/DL
ALP SERPL-CCNC: 93 U/L (ref 30–99)
ALT SERPL-CCNC: 138 U/L (ref 2–50)
AMORPH CRY #/AREA URNS HPF: PRESENT /HPF
ANION GAP SERPL CALC-SCNC: 10 MMOL/L (ref 0–11.9)
APPEARANCE UR: CLEAR
AST SERPL-CCNC: 153 U/L (ref 12–45)
BACTERIA #/AREA URNS HPF: NEGATIVE /HPF
BASOPHILS # BLD AUTO: 0.5 % (ref 0–1.8)
BASOPHILS # BLD: 0.07 K/UL (ref 0–0.12)
BILIRUB SERPL-MCNC: 0.3 MG/DL (ref 0.1–1.5)
BILIRUB UR QL STRIP.AUTO: NEGATIVE
BUN SERPL-MCNC: 25 MG/DL (ref 8–22)
CALCIUM SERPL-MCNC: 8.7 MG/DL (ref 8.5–10.5)
CHLORIDE SERPL-SCNC: 95 MMOL/L (ref 96–112)
CK SERPL-CCNC: 2680 U/L (ref 0–154)
CO2 SERPL-SCNC: 23 MMOL/L (ref 20–33)
COLOR UR: YELLOW
CREAT SERPL-MCNC: 0.75 MG/DL (ref 0.5–1.4)
EKG IMPRESSION: NORMAL
EOSINOPHIL # BLD AUTO: 0.55 K/UL (ref 0–0.51)
EOSINOPHIL NFR BLD: 3.6 % (ref 0–6.9)
EPI CELLS #/AREA URNS HPF: NEGATIVE /HPF
ERYTHROCYTE [DISTWIDTH] IN BLOOD BY AUTOMATED COUNT: 38.5 FL (ref 35.9–50)
GLOBULIN SER CALC-MCNC: 3.9 G/DL (ref 1.9–3.5)
GLUCOSE SERPL-MCNC: 98 MG/DL (ref 65–99)
GLUCOSE UR STRIP.AUTO-MCNC: NEGATIVE MG/DL
HCT VFR BLD AUTO: 37.4 % (ref 37–47)
HGB BLD-MCNC: 13 G/DL (ref 12–16)
HYALINE CASTS #/AREA URNS LPF: ABNORMAL /LPF
IMM GRANULOCYTES # BLD AUTO: 0.24 K/UL (ref 0–0.11)
IMM GRANULOCYTES NFR BLD AUTO: 1.6 % (ref 0–0.9)
KETONES UR STRIP.AUTO-MCNC: ABNORMAL MG/DL
LEUKOCYTE ESTERASE UR QL STRIP.AUTO: NEGATIVE
LYMPHOCYTES # BLD AUTO: 1.64 K/UL (ref 1–4.8)
LYMPHOCYTES NFR BLD: 10.7 % (ref 22–41)
MAGNESIUM SERPL-MCNC: 1.7 MG/DL (ref 1.5–2.5)
MCH RBC QN AUTO: 28 PG (ref 27–33)
MCHC RBC AUTO-ENTMCNC: 34.8 G/DL (ref 33.6–35)
MCV RBC AUTO: 80.6 FL (ref 81.4–97.8)
MICRO URNS: ABNORMAL
MONOCYTES # BLD AUTO: 0.9 K/UL (ref 0–0.85)
MONOCYTES NFR BLD AUTO: 5.9 % (ref 0–13.4)
NEUTROPHILS # BLD AUTO: 11.98 K/UL (ref 2–7.15)
NEUTROPHILS NFR BLD: 77.7 % (ref 44–72)
NITRITE UR QL STRIP.AUTO: NEGATIVE
NRBC # BLD AUTO: 0 K/UL
NRBC BLD-RTO: 0 /100 WBC
PH UR STRIP.AUTO: 6 [PH]
PHOSPHATE SERPL-MCNC: 4 MG/DL (ref 2.5–4.5)
PLATELET # BLD AUTO: 391 K/UL (ref 164–446)
PMV BLD AUTO: 8.7 FL (ref 9–12.9)
POTASSIUM SERPL-SCNC: 3.6 MMOL/L (ref 3.6–5.5)
PROT SERPL-MCNC: 6.7 G/DL (ref 6–8.2)
PROT UR QL STRIP: NEGATIVE MG/DL
RBC # BLD AUTO: 4.64 M/UL (ref 4.2–5.4)
RBC # URNS HPF: ABNORMAL /HPF
RBC UR QL AUTO: ABNORMAL
RHEUMATOID FACT SER IA-ACNC: <10 IU/ML (ref 0–14)
SODIUM SERPL-SCNC: 128 MMOL/L (ref 135–145)
SP GR UR STRIP.AUTO: 1.02
TROPONIN I SERPL-MCNC: 0.13 NG/ML (ref 0–0.04)
TSH SERPL DL<=0.005 MIU/L-ACNC: 3.66 UIU/ML (ref 0.38–5.33)
UROBILINOGEN UR STRIP.AUTO-MCNC: 0.2 MG/DL
WBC # BLD AUTO: 15.4 K/UL (ref 4.8–10.8)
WBC #/AREA URNS HPF: ABNORMAL /HPF

## 2019-01-16 PROCEDURE — 84484 ASSAY OF TROPONIN QUANT: CPT

## 2019-01-16 PROCEDURE — 82550 ASSAY OF CK (CPK): CPT

## 2019-01-16 PROCEDURE — 81001 URINALYSIS AUTO W/SCOPE: CPT

## 2019-01-16 PROCEDURE — 36415 COLL VENOUS BLD VENIPUNCTURE: CPT

## 2019-01-16 PROCEDURE — 700111 HCHG RX REV CODE 636 W/ 250 OVERRIDE (IP): Performed by: HOSPITALIST

## 2019-01-16 PROCEDURE — 99223 1ST HOSP IP/OBS HIGH 75: CPT | Performed by: HOSPITALIST

## 2019-01-16 PROCEDURE — 96372 THER/PROPH/DIAG INJ SC/IM: CPT

## 2019-01-16 PROCEDURE — 99285 EMERGENCY DEPT VISIT HI MDM: CPT

## 2019-01-16 PROCEDURE — 83735 ASSAY OF MAGNESIUM: CPT

## 2019-01-16 PROCEDURE — 700102 HCHG RX REV CODE 250 W/ 637 OVERRIDE(OP): Performed by: HOSPITALIST

## 2019-01-16 PROCEDURE — 84100 ASSAY OF PHOSPHORUS: CPT

## 2019-01-16 PROCEDURE — 80053 COMPREHEN METABOLIC PANEL: CPT

## 2019-01-16 PROCEDURE — 93005 ELECTROCARDIOGRAM TRACING: CPT | Performed by: EMERGENCY MEDICINE

## 2019-01-16 PROCEDURE — A9270 NON-COVERED ITEM OR SERVICE: HCPCS | Performed by: HOSPITALIST

## 2019-01-16 PROCEDURE — 700101 HCHG RX REV CODE 250: Performed by: HOSPITALIST

## 2019-01-16 PROCEDURE — 84443 ASSAY THYROID STIM HORMONE: CPT

## 2019-01-16 PROCEDURE — 85025 COMPLETE CBC W/AUTO DIFF WBC: CPT

## 2019-01-16 PROCEDURE — 86431 RHEUMATOID FACTOR QUANT: CPT

## 2019-01-16 PROCEDURE — 770020 HCHG ROOM/CARE - TELE (206)

## 2019-01-16 PROCEDURE — 700105 HCHG RX REV CODE 258: Performed by: EMERGENCY MEDICINE

## 2019-01-16 RX ORDER — SIMVASTATIN 80 MG
80 TABLET ORAL NIGHTLY
Status: ON HOLD | COMMUNITY
End: 2019-01-22

## 2019-01-16 RX ORDER — HYDROCHLOROTHIAZIDE 12.5 MG/1
12.5 TABLET ORAL DAILY
Status: ON HOLD | COMMUNITY
End: 2019-01-22

## 2019-01-16 RX ORDER — AMOXICILLIN 250 MG
2 CAPSULE ORAL 2 TIMES DAILY
Status: DISCONTINUED | OUTPATIENT
Start: 2019-01-16 | End: 2019-01-22 | Stop reason: HOSPADM

## 2019-01-16 RX ORDER — SODIUM CHLORIDE AND POTASSIUM CHLORIDE 150; 900 MG/100ML; MG/100ML
INJECTION, SOLUTION INTRAVENOUS CONTINUOUS
Status: DISCONTINUED | OUTPATIENT
Start: 2019-01-16 | End: 2019-01-19

## 2019-01-16 RX ORDER — IBUPROFEN 800 MG/1
400 TABLET ORAL EVERY 6 HOURS PRN
Status: DISCONTINUED | OUTPATIENT
Start: 2019-01-16 | End: 2019-01-22 | Stop reason: HOSPADM

## 2019-01-16 RX ORDER — SODIUM CHLORIDE 9 MG/ML
1000 INJECTION, SOLUTION INTRAVENOUS ONCE
Status: COMPLETED | OUTPATIENT
Start: 2019-01-16 | End: 2019-01-16

## 2019-01-16 RX ORDER — BISOPROLOL FUMARATE 10 MG/1
10-20 TABLET, FILM COATED ORAL SEE ADMIN INSTRUCTIONS
Status: ON HOLD | COMMUNITY
End: 2019-01-22

## 2019-01-16 RX ORDER — ASPIRIN 325 MG
325 TABLET ORAL EVERY EVENING
Status: DISCONTINUED | OUTPATIENT
Start: 2019-01-16 | End: 2019-01-22 | Stop reason: HOSPADM

## 2019-01-16 RX ORDER — BISACODYL 10 MG
10 SUPPOSITORY, RECTAL RECTAL
Status: DISCONTINUED | OUTPATIENT
Start: 2019-01-16 | End: 2019-01-22 | Stop reason: HOSPADM

## 2019-01-16 RX ORDER — ACETAMINOPHEN 325 MG/1
650 TABLET ORAL EVERY 6 HOURS PRN
Status: DISCONTINUED | OUTPATIENT
Start: 2019-01-16 | End: 2019-01-22 | Stop reason: HOSPADM

## 2019-01-16 RX ORDER — POLYETHYLENE GLYCOL 3350 17 G/17G
1 POWDER, FOR SOLUTION ORAL
Status: DISCONTINUED | OUTPATIENT
Start: 2019-01-16 | End: 2019-01-22 | Stop reason: HOSPADM

## 2019-01-16 RX ADMIN — POTASSIUM CHLORIDE AND SODIUM CHLORIDE: 900; 150 INJECTION, SOLUTION INTRAVENOUS at 17:01

## 2019-01-16 RX ADMIN — POTASSIUM CHLORIDE AND SODIUM CHLORIDE: 900; 150 INJECTION, SOLUTION INTRAVENOUS at 23:14

## 2019-01-16 RX ADMIN — SODIUM CHLORIDE 1000 ML: 9 INJECTION, SOLUTION INTRAVENOUS at 12:32

## 2019-01-16 RX ADMIN — ASPIRIN 325 MG: 325 TABLET ORAL at 17:38

## 2019-01-16 RX ADMIN — ENOXAPARIN SODIUM 40 MG: 100 INJECTION SUBCUTANEOUS at 17:38

## 2019-01-16 ASSESSMENT — PAIN SCALES - GENERAL
PAINLEVEL_OUTOF10: 2
PAINLEVEL_OUTOF10: 5
PAINLEVEL_OUTOF10: 3

## 2019-01-16 ASSESSMENT — PATIENT HEALTH QUESTIONNAIRE - PHQ9
2. FEELING DOWN, DEPRESSED, IRRITABLE, OR HOPELESS: NOT AT ALL
1. LITTLE INTEREST OR PLEASURE IN DOING THINGS: NOT AT ALL
SUM OF ALL RESPONSES TO PHQ9 QUESTIONS 1 AND 2: 0

## 2019-01-16 ASSESSMENT — ENCOUNTER SYMPTOMS
CHILLS: 0
FEVER: 0
ROS GI COMMENTS: POOR APPETITE
WEAKNESS: 1

## 2019-01-16 NOTE — ED TRIAGE NOTES
Chief Complaint   Patient presents with   • Sent by MD     sent by md for abnormal labs. has elevated CPK of 2306   • Weakness     generalized weakness x 2 weeks. also c/o joint pain. neuro intact. no facial droop/no slurred speech/ equal. AAOX4.     Triage process explained. Instructed to notify staff for any worsening symptoms. Placed in senior lounge room.

## 2019-01-16 NOTE — ED NOTES
Pt to R5 via wheelchair, pt states she has had malaise x5 days, little to no appetite.   Pt denies any recent illness, or injury.

## 2019-01-16 NOTE — ED PROVIDER NOTES
ED Provider Note    CHIEF COMPLAINT  Chief Complaint   Patient presents with   • Sent by MD     sent by md for abnormal labs. has elevated CPK of 2306   • Weakness     generalized weakness x 2 weeks. also c/o joint pain. neuro intact. no facial droop/no slurred speech/ equal. AAOX4.       HPI  Vane Ritter is a 68 y.o. female with a history of arthritis, dyslipidemia and hypertension who presents complaining of generalized weakness.  Patient states she was sent in by her PCP for an elevated CPK found on outpatient labs.  Patient is currently taking simvastatin.  Her last dose was last evening although she skips doses occasionally.    Patient states she has had progressive weakness for the last 1-2 weeks.  She has had increased joint pain particularly in her right wrist, left knee, and left shoulder.  She has been unable to blow dry her hair in the last several days due to the generalized weakness.  She denies numbness, chest pain, shortness of breath, headache, visual changes, syncope, urinary symptoms.    ALLERGIES  Allergies   Allergen Reactions   • Lisinopril Cough       CURRENT MEDICATIONS  Home Medications     Reviewed by Sukumar Boss (Pharmacy Tech) on 01/16/19 at 1242  Med List Status: Complete   Medication Last Dose Status   albuterol 108 (90 Base) MCG/ACT Aero Soln inhalation aerosol >4months Active   aspirin (ASA) 325 MG TABS 1/15/2019 Active   bisoprolol (ZEBETA) 10 MG tablet 1/16/2019 Active   hydroCHLOROthiazide (HYDRODIURIL) 12.5 MG tablet 1/16/2019 Active   simvastatin (ZOCOR) 80 MG tablet >2days Active                PAST MEDICAL HISTORY   has a past medical history of Heart attack (HCC) and Hypertension.    SURGICAL HISTORY   has a past surgical history that includes cyst excision; stent placement; open reduction; and tubal coagulation laparoscopic bilateral.    SOCIAL HISTORY  Social History     Social History Main Topics   • Smoking status: Former Smoker     Packs/day: 0.50  "    Years: 35.00     Types: Cigarettes   • Smokeless tobacco: Never Used   • Alcohol use Yes      Comment: rare   • Drug use: No   • Sexual activity: Not Currently     Partners: Male         REVIEW OF SYSTEMS  See HPI for further details.  All other systems are negative except as above in HPI.      PHYSICAL EXAM  VITAL SIGNS: /61   Pulse 71   Temp 37.1 °C (98.8 °F) (Temporal)   Resp 19   Ht 1.626 m (5' 4\")   Wt 77.3 kg (170 lb 6.7 oz)   SpO2 95%   BMI 29.25 kg/m²     General:  WDWN, nontoxic appearing in NAD but appears fatigued; A+Ox3; V/S as above   Skin: warm and dry; good color; no rash  HEENT: NCAT; EOMs intact; PERRL; no scleral icterus   Neck: FROM; no stridor  Cardiovascular: Regular heart rate and rhythm.  No murmurs, rubs, or gallops; pulses 2+ bilaterally radially  Lungs: Clear to auscultation with good air movement bilaterally.  No wheezes, rhonchi, or rales.   Abdomen: BS present; soft; NTND; no rebound, guarding, or rigidity.  No organomegaly or pulsatile mass  Extremities: NIX x 4; no e/o trauma; no pedal edema; neg Wander's  Neurologic: CNs III-XII grossly intact; speech clear; distal sensation intact; strength 5/5 UE/LEs; DTRs 2+ bilaterally in patellar/BR areas  Psychiatric: Appropriate affect, normal mood    LABS  Results for orders placed or performed during the hospital encounter of 01/16/19   CBC WITH DIFFERENTIAL   Result Value Ref Range    WBC 15.4 (H) 4.8 - 10.8 K/uL    RBC 4.64 4.20 - 5.40 M/uL    Hemoglobin 13.0 12.0 - 16.0 g/dL    Hematocrit 37.4 37.0 - 47.0 %    MCV 80.6 (L) 81.4 - 97.8 fL    MCH 28.0 27.0 - 33.0 pg    MCHC 34.8 33.6 - 35.0 g/dL    RDW 38.5 35.9 - 50.0 fL    Platelet Count 391 164 - 446 K/uL    MPV 8.7 (L) 9.0 - 12.9 fL    Neutrophils-Polys 77.70 (H) 44.00 - 72.00 %    Lymphocytes 10.70 (L) 22.00 - 41.00 %    Monocytes 5.90 0.00 - 13.40 %    Eosinophils 3.60 0.00 - 6.90 %    Basophils 0.50 0.00 - 1.80 %    Immature Granulocytes 1.60 (H) 0.00 - 0.90 % "    Nucleated RBC 0.00 /100 WBC    Neutrophils (Absolute) 11.98 (H) 2.00 - 7.15 K/uL    Lymphs (Absolute) 1.64 1.00 - 4.80 K/uL    Monos (Absolute) 0.90 (H) 0.00 - 0.85 K/uL    Eos (Absolute) 0.55 (H) 0.00 - 0.51 K/uL    Baso (Absolute) 0.07 0.00 - 0.12 K/uL    Immature Granulocytes (abs) 0.24 (H) 0.00 - 0.11 K/uL    NRBC (Absolute) 0.00 K/uL   CMP   Result Value Ref Range    Sodium 128 (L) 135 - 145 mmol/L    Potassium 3.6 3.6 - 5.5 mmol/L    Chloride 95 (L) 96 - 112 mmol/L    Co2 23 20 - 33 mmol/L    Anion Gap 10.0 0.0 - 11.9    Glucose 98 65 - 99 mg/dL    Bun 25 (H) 8 - 22 mg/dL    Creatinine 0.75 0.50 - 1.40 mg/dL    Calcium 8.7 8.5 - 10.5 mg/dL    AST(SGOT) 153 (H) 12 - 45 U/L    ALT(SGPT) 138 (H) 2 - 50 U/L    Alkaline Phosphatase 93 30 - 99 U/L    Total Bilirubin 0.3 0.1 - 1.5 mg/dL    Albumin 2.8 (L) 3.2 - 4.9 g/dL    Total Protein 6.7 6.0 - 8.2 g/dL    Globulin 3.9 (H) 1.9 - 3.5 g/dL    A-G Ratio 0.7 g/dL   CREATINE KINASE   Result Value Ref Range    CPK Total 2680 (HH) 0 - 154 U/L   URINALYSIS   Result Value Ref Range    Color Yellow     Character Clear     Specific Gravity 1.017 <1.035    Ph 6.0 5.0 - 8.0    Glucose Negative Negative mg/dL    Ketones Trace (A) Negative mg/dL    Protein Negative Negative mg/dL    Bilirubin Negative Negative    Urobilinogen, Urine 0.2 Negative    Nitrite Negative Negative    Leukocyte Esterase Negative Negative    Occult Blood Trace (A) Negative    Micro Urine Req Microscopic    TSH   Result Value Ref Range    TSH 3.660 0.380 - 5.330 uIU/mL   ESTIMATED GFR   Result Value Ref Range    GFR If African American >60 >60 mL/min/1.73 m 2    GFR If Non African American >60 >60 mL/min/1.73 m 2   URINE MICROSCOPIC (W/UA)   Result Value Ref Range    WBC 0-2 /hpf    RBC 2-5 (A) /hpf    Bacteria Negative None /hpf    Epithelial Cells Negative /hpf    Amorphous Crystal Present /hpf    Hyaline Cast 6-10 (A) /lpf   EKG   Result Value Ref Range    Report       [facility]  Emergency Dept.    Test Date:  2019  Pt Name:    VANE RITTER         Department: ER  MRN:        [id number]                      Room:       RD 05  Gender:     Female                       Technician: [id number]  :        1950                   Requested By:BRENDA JAMES  Order #:    [id number]                    Reading MD: BRENDA JAMES MD    Measurements  Intervals                                Axis  Rate:       66                           P:          68  AR:         184                          QRS:        67  QRSD:       86                           T:          17  QT:         432  QTc:        453    Interpretive Statements  SINUS RHYTHM  PROBABLE LEFT ATRIAL ABNORMALITY  No previous ECG available for comparison    Electronically Signed On 2019 12:49:59 PST by BRENDA JAMES MD         MEDICAL RECORD  I have reviewed patient's medical record and pertinent results are listed below.      COURSE & MEDICAL DECISION MAKING  I have reviewed any medical record information, laboratory studies and radiographic results as noted.    Vane Ritter is a 68 y.o. female who presents complaining of generalized weakness.  Patient was sent in for an elevated CPK detected on outpatient labs yesterday.  Patient is on a statin.      NS bolus was ordered for treatment of elevated CPK/rhabdomyolysis.    Pt was re-evaluated at 1:26 PM  Labs are available.  Patient continues to have a CPK rise/elevation.  Creatinine is normal.  Patient also has a leukocytosis, hyponatremia of 128, BUN 25, , .  TSH is normal.  Urinalysis is negative.  Troponin, phosphorus, magnesium, and room.  Dr. Daniels is aware of the patient's need for admission and will see her.    FINAL IMPRESSION  1. Elevated CPK    2. Generalized weakness    3. Dehydration      Electronically signed by: Brenda James, 2019 12:11 PM

## 2019-01-16 NOTE — ED NOTES
"Med Rec Updated and Complete per Pt with phone call to Home Pharmacy at bedside  Allergies Reviewed  No PO ABX last 30 days per Pt and Pharmacy    Pt reports PCP stopped her Simvastatin.     Pt reports that she is taking an \"ACE inhibitor\", home pharmacy states pt is currently filling Bisoprolol.      "

## 2019-01-16 NOTE — ED NOTES
Jacqueline from Lab called with critical result of CPK at 6500. Critical lab result read back to Jacqueline.   Dr. James notified of critical lab result at 1255.  Critical lab result read back by Dr. James.

## 2019-01-17 ENCOUNTER — APPOINTMENT (OUTPATIENT)
Dept: RADIOLOGY | Facility: MEDICAL CENTER | Age: 69
DRG: 558 | End: 2019-01-17
Attending: INTERNAL MEDICINE
Payer: MEDICARE

## 2019-01-17 LAB
ALBUMIN SERPL BCP-MCNC: 2.5 G/DL (ref 3.2–4.9)
ALBUMIN/GLOB SERPL: 0.7 G/DL
ALP SERPL-CCNC: 87 U/L (ref 30–99)
ALT SERPL-CCNC: 135 U/L (ref 2–50)
ANION GAP SERPL CALC-SCNC: 8 MMOL/L (ref 0–11.9)
AST SERPL-CCNC: 147 U/L (ref 12–45)
BILIRUB SERPL-MCNC: 0.3 MG/DL (ref 0.1–1.5)
BUN SERPL-MCNC: 22 MG/DL (ref 8–22)
CALCIUM SERPL-MCNC: 8 MG/DL (ref 8.5–10.5)
CHLORIDE SERPL-SCNC: 98 MMOL/L (ref 96–112)
CK SERPL-CCNC: 3098 U/L (ref 0–154)
CO2 SERPL-SCNC: 24 MMOL/L (ref 20–33)
CREAT SERPL-MCNC: 0.78 MG/DL (ref 0.5–1.4)
EKG IMPRESSION: NORMAL
GLOBULIN SER CALC-MCNC: 3.4 G/DL (ref 1.9–3.5)
GLUCOSE SERPL-MCNC: 89 MG/DL (ref 65–99)
HAV IGM SERPL QL IA: NEGATIVE
HBV CORE IGM SER QL: NEGATIVE
HBV SURFACE AG SER QL: NEGATIVE
HCV AB SER QL: NEGATIVE
POTASSIUM SERPL-SCNC: 3.5 MMOL/L (ref 3.6–5.5)
PROT SERPL-MCNC: 5.9 G/DL (ref 6–8.2)
SODIUM SERPL-SCNC: 130 MMOL/L (ref 135–145)
TROPONIN I SERPL-MCNC: 0.14 NG/ML (ref 0–0.04)
TROPONIN I SERPL-MCNC: 0.15 NG/ML (ref 0–0.04)
TROPONIN I SERPL-MCNC: 0.17 NG/ML (ref 0–0.04)

## 2019-01-17 PROCEDURE — 93010 ELECTROCARDIOGRAM REPORT: CPT | Performed by: INTERNAL MEDICINE

## 2019-01-17 PROCEDURE — 93005 ELECTROCARDIOGRAM TRACING: CPT | Performed by: INTERNAL MEDICINE

## 2019-01-17 PROCEDURE — 36415 COLL VENOUS BLD VENIPUNCTURE: CPT

## 2019-01-17 PROCEDURE — 770020 HCHG ROOM/CARE - TELE (206)

## 2019-01-17 PROCEDURE — A9270 NON-COVERED ITEM OR SERVICE: HCPCS | Performed by: HOSPITALIST

## 2019-01-17 PROCEDURE — 82550 ASSAY OF CK (CPK): CPT

## 2019-01-17 PROCEDURE — 99233 SBSQ HOSP IP/OBS HIGH 50: CPT | Performed by: INTERNAL MEDICINE

## 2019-01-17 PROCEDURE — 80053 COMPREHEN METABOLIC PANEL: CPT

## 2019-01-17 PROCEDURE — 76700 US EXAM ABDOM COMPLETE: CPT

## 2019-01-17 PROCEDURE — 84484 ASSAY OF TROPONIN QUANT: CPT

## 2019-01-17 PROCEDURE — 700101 HCHG RX REV CODE 250: Performed by: HOSPITALIST

## 2019-01-17 PROCEDURE — 80074 ACUTE HEPATITIS PANEL: CPT

## 2019-01-17 PROCEDURE — 700111 HCHG RX REV CODE 636 W/ 250 OVERRIDE (IP): Performed by: HOSPITALIST

## 2019-01-17 PROCEDURE — 700102 HCHG RX REV CODE 250 W/ 637 OVERRIDE(OP): Performed by: HOSPITALIST

## 2019-01-17 RX ADMIN — POTASSIUM CHLORIDE AND SODIUM CHLORIDE: 900; 150 INJECTION, SOLUTION INTRAVENOUS at 23:44

## 2019-01-17 RX ADMIN — POTASSIUM CHLORIDE AND SODIUM CHLORIDE: 900; 150 INJECTION, SOLUTION INTRAVENOUS at 12:00

## 2019-01-17 RX ADMIN — POTASSIUM CHLORIDE AND SODIUM CHLORIDE: 900; 150 INJECTION, SOLUTION INTRAVENOUS at 05:16

## 2019-01-17 RX ADMIN — ENOXAPARIN SODIUM 40 MG: 100 INJECTION SUBCUTANEOUS at 05:16

## 2019-01-17 RX ADMIN — POTASSIUM CHLORIDE AND SODIUM CHLORIDE: 900; 150 INJECTION, SOLUTION INTRAVENOUS at 17:19

## 2019-01-17 RX ADMIN — ASPIRIN 325 MG: 325 TABLET ORAL at 17:19

## 2019-01-17 ASSESSMENT — LIFESTYLE VARIABLES
HOW MANY TIMES IN THE PAST YEAR HAVE YOU HAD 5 OR MORE DRINKS IN A DAY: 0
HAVE YOU EVER FELT YOU SHOULD CUT DOWN ON YOUR DRINKING: NO
TOTAL SCORE: 0
EVER HAD A DRINK FIRST THING IN THE MORNING TO STEADY YOUR NERVES TO GET RID OF A HANGOVER: NO
HAVE PEOPLE ANNOYED YOU BY CRITICIZING YOUR DRINKING: NO
AVERAGE NUMBER OF DAYS PER WEEK YOU HAVE A DRINK CONTAINING ALCOHOL: 1
CONSUMPTION TOTAL: NEGATIVE
ALCOHOL_USE: YES
ON A TYPICAL DAY WHEN YOU DRINK ALCOHOL HOW MANY DRINKS DO YOU HAVE: 0
TOTAL SCORE: 0
EVER FELT BAD OR GUILTY ABOUT YOUR DRINKING: NO
TOTAL SCORE: 0

## 2019-01-17 ASSESSMENT — COGNITIVE AND FUNCTIONAL STATUS - GENERAL
DAILY ACTIVITIY SCORE: 24
MOBILITY SCORE: 21
STANDING UP FROM CHAIR USING ARMS: A LITTLE
CLIMB 3 TO 5 STEPS WITH RAILING: A LITTLE
SUGGESTED CMS G CODE MODIFIER DAILY ACTIVITY: CH
SUGGESTED CMS G CODE MODIFIER MOBILITY: CJ
WALKING IN HOSPITAL ROOM: A LITTLE

## 2019-01-17 ASSESSMENT — ENCOUNTER SYMPTOMS
ABDOMINAL PAIN: 0
WEAKNESS: 1
MYALGIAS: 1
BLURRED VISION: 0
VOMITING: 0
NAUSEA: 0
CHILLS: 1
COUGH: 0
SHORTNESS OF BREATH: 0

## 2019-01-17 ASSESSMENT — PATIENT HEALTH QUESTIONNAIRE - PHQ9
1. LITTLE INTEREST OR PLEASURE IN DOING THINGS: NOT AT ALL
SUM OF ALL RESPONSES TO PHQ9 QUESTIONS 1 AND 2: 0
2. FEELING DOWN, DEPRESSED, IRRITABLE, OR HOPELESS: NOT AT ALL
SUM OF ALL RESPONSES TO PHQ9 QUESTIONS 1 AND 2: 0
1. LITTLE INTEREST OR PLEASURE IN DOING THINGS: NOT AT ALL
2. FEELING DOWN, DEPRESSED, IRRITABLE, OR HOPELESS: NOT AT ALL

## 2019-01-17 ASSESSMENT — PAIN SCALES - GENERAL
PAINLEVEL_OUTOF10: 2
PAINLEVEL_OUTOF10: 2

## 2019-01-17 NOTE — DIETARY
"Nutrition Services:  Brief Update    Supplements noted on Nutrition Admit Screen. Pt is currently on a Regular diet and per chart pt PO % for one meal thus far. Ht: 162.6 cm, Wt: 78.5 kg, BMI 29.7.    Spoke with pt at bedside.  Pt report using Boost/Ensure supplements at home when she does not have time to eat a meal.  Pt denies receiving nutrition supplements while she is here, stating that the food \"will be enough\".  Consult RD as needed. RD will re-screen weekly.      RD available PRN.    "

## 2019-01-17 NOTE — ASSESSMENT & PLAN NOTE
-labs continue to remain with same level of elevation, no abdominal pain again today  -ALT is elevated which might not be due to rhabdo  -trend closely  -HEP panel Is negative  -check AB us, which shows fatty liver

## 2019-01-17 NOTE — PROGRESS NOTES
Hospital Medicine Daily Progress Note    Date of Service  1/17/2019    Chief Complaint  68 y.o. female admitted 1/16/2019 with rhabdomyolysis presumed 2/2 statin use    Hospital Course  See below    Interval Problem Update  Rhabdo-feels quite ill and has muscle pain throughout her entire body. CPK has risen as well. Urinating quite well.  No fevers and no kidney issues.    Elevated LFTS-remains about the same, ALT is elevated as well.    Transient possible 2nd degree heart block. 12-lead EKG done and personally reviewed by me shows NSR, HR around 80 and no e/o acute ST segment changes.    Consultants/Specialty  none    Code Status  fc/fc    Disposition  TELE then home    Review of Systems  Review of Systems   Constitutional: Positive for chills and malaise/fatigue.   Eyes: Negative for blurred vision.   Respiratory: Negative for cough and shortness of breath.    Gastrointestinal: Negative for abdominal pain, nausea and vomiting.   Genitourinary: Positive for frequency. Negative for dysuria, hematuria and urgency.   Musculoskeletal: Positive for myalgias.   Neurological: Positive for weakness.   All other systems reviewed and are negative.       Physical Exam  Temp:  [36.6 °C (97.8 °F)-37.3 °C (99.2 °F)] 36.6 °C (97.8 °F)  Pulse:  [73-89] 79  Resp:  [16-23] 16  BP: (118-134)/(61-76) 134/63  SpO2:  [92 %-96 %] 95 %    Physical Exam   Constitutional: She is oriented to person, place, and time. She appears well-developed and well-nourished. No distress.   HENT:   Head: Normocephalic and atraumatic.   Mouth/Throat: No oropharyngeal exudate.   Eyes: Pupils are equal, round, and reactive to light. Right eye exhibits no discharge. Left eye exhibits no discharge.   Neck: Normal range of motion. Neck supple.   Cardiovascular: Normal rate, regular rhythm, normal heart sounds and intact distal pulses.    No murmur heard.  Pulmonary/Chest: Effort normal and breath sounds normal. No stridor. No respiratory distress. She has no  wheezes.   Abdominal: Soft. Bowel sounds are normal. There is no tenderness.   Musculoskeletal: Normal range of motion. She exhibits tenderness (throughout). She exhibits no edema.   Neurological: She is alert and oriented to person, place, and time. No cranial nerve deficit.   Skin: Skin is warm and dry. No rash noted.   Psychiatric: She has a normal mood and affect.   Nursing note and vitals reviewed.      Fluids    Intake/Output Summary (Last 24 hours) at 01/17/19 1513  Last data filed at 01/17/19 0011   Gross per 24 hour   Intake              360 ml   Output                0 ml   Net              360 ml       Laboratory  Recent Labs      01/15/19   1119  01/16/19   1201   WBC  13.5*  15.4*   RBC  4.69  4.64   HEMOGLOBIN  13.1  13.0   HEMATOCRIT  38.3  37.4   MCV  81.7  80.6*   MCH  27.9  28.0   MCHC  34.2  34.8   RDW  39.7  38.5   PLATELETCT  410  391   MPV  9.0  8.7*     Recent Labs      01/15/19   1119  01/16/19   1201  01/17/19   0106   SODIUM  131*  128*  130*   POTASSIUM  3.7  3.6  3.5*   CHLORIDE  96  95*  98   CO2  26  23  24   GLUCOSE  88  98  89   BUN  24*  25*  22   CREATININE  0.87  0.75  0.78   CALCIUM  8.9  8.7  8.0*             Recent Labs      01/15/19   1119   TRIGLYCERIDE  296*   HDL  22*   LDL  135*       Imaging  US-ABDOMEN COMPLETE SURVEY    (Results Pending)        Assessment/Plan  * Rhabdomyolysis due to statin therapy- (present on admission)   Assessment & Plan    -cannot have statins anymore  -might be a PSCK9 inhibitor candidate?  She also has associated transaminitis.  IV fluids will be given and will trend her CPK and hold her statin-CPK is rising, continue current fluid levels, no e/o  Kidney issues at this time  She will be given oral and IV narcotic pain medications given significant pain she is experiencing     Multiple joint pain- (present on admission)   Assessment & Plan    Likely secondary to statin induced rhabdomyolysis  Is unlikely to develop a acute onset of a rheumatologic  disease process at this age with such rapid onset     Elevated troponin- (present on admission)   Assessment & Plan    Troponin is now 0.17, but no chest pain, no tele issues, no acute 12-lead EKG changes  -likely from rhabdo  -continue to trend until stabilizes  -monitor closely     Transaminitis- (present on admission)   Assessment & Plan    -elevated levels remain about the same  -ALT is elevated which might not be due to rhabdo  -trend closely  -HEP panel Is negative  -check AB us     Hyponatremia- (present on admission)   Assessment & Plan    Sodium is slowly improving  IV fluids will be given with sodium and will hold on her hydrochlorothiazide and repeat her level in the morning     History of MI (myocardial infarction)- (present on admission)   Assessment & Plan    History of coronary stent in 2009     Overweight with body mass index (BMI) 25.0-29.9- (present on admission)   Assessment & Plan    BMI of 29     Essential hypertension- (present on admission)   Assessment & Plan    Outpatient bisoprolol and hydrochlorothiazide have been held as her blood pressure somewhat initially low, but improving slowly  -might re-start tomorrow          VTE prophylaxis: lovenox

## 2019-01-17 NOTE — PROGRESS NOTES
0715: Monitor room called--pts HR christina to 33 with 2.3 sec pause.  This RN woke pt up, pt denies any chest pain, pressure or SOB.    0725: Bedside report received.  Pt resting in bed.  Denies any pain or needs at this time.  POC discussed.  Pt wants to sleep longer.  Call light and personal belongings within reach.  Bed locked in lowest position.  Bed alarm on.  Pt educated to call for assistance.    0925:  Monitor room ([facility]) called--upon review of tele pt appeared to have intermittent high grade AV block.  Dr. Varela notified and ordered EKG.    1000: EKG completed--NSR.    1720: Confirmed with Dr. Varela continuation of NS+20K@125.

## 2019-01-17 NOTE — ED NOTES
Pt provided with hospital bed.   Pt sitting in chair at bedside for comfort. Educated on call light use.

## 2019-01-17 NOTE — ASSESSMENT & PLAN NOTE
-appears to be possibly be a new diagnosis, joint pain is worse today  -more prominent swelling over various joints and associated pain  -Anti-CCP very elevated, ESR/CRP elevated  -F/U all rheum labs  -start prednisone 20 mg daily  -will need rheum set up and possible DMARD started with MTX (less likely given possible liver issues)

## 2019-01-17 NOTE — CARE PLAN
Problem: Communication  Goal: The ability to communicate needs accurately and effectively will improve  Outcome: PROGRESSING AS EXPECTED  Pt is able to communicate needs appropriately. Call light within reach.        Problem: Safety  Goal: Will remain free from injury  Outcome: PROGRESSING AS EXPECTED  Fall precautions in place. Bed in lowest position. Non-skid socks in place. Personal possessions within reach. Mobility sign on door. Bed-alarm on. Call light within reach. Pt educated regarding fall prevention and states understanding.

## 2019-01-17 NOTE — PROGRESS NOTES
Transferred from ER to  808-1. Assumed care at 2045. Bedside report received from ALMA Higgins. Patient's chart and MAR reviewed. Pt denies pain at this time. Pt is A & O x4. Patient was updated on plan of care for the day. Questions answered and concerns addressed.  Pt denies any additional needs at this time. White board updated. Call light, phone and personal belongings within reach.

## 2019-01-17 NOTE — ED NOTES
Pt rounded on, resting in bed, respirations even and unlabored, repositioning self as needed, updated on POC.

## 2019-01-17 NOTE — H&P
"Hospital Medicine History & Physical Note    Date of Service  2019    Primary Care Physician  FUAD Hernandez    Code Status  full    Chief Complaint  Muscle pain    History of Presenting Illness  68 y.o. female who presented 2019 with diffuse body aches. Ms. Ritter has a history of CAD and dyslipidemia    Review of Systems  Review of Systems   Constitutional: Positive for malaise/fatigue. Negative for chills and fever.        No night sweats  +5 lb weight loss past few weeks   Cardiovascular: Negative for chest pain and leg swelling.   Gastrointestinal:        Poor appetite   Neurological: Positive for weakness.   All other systems reviewed and are negative.      Past Medical History  Hypertension, coronary artery disease with stent in , tobacco dependence, dyslipidemia, arthritis presented to urgent care on 2019 with diffuse body aches that started the day before.  She states her pain is been in her wrists, shoulders, and knees though not in her ankles nor hips the pain is been symmetric.  She states she has been feeling \"weak\" and has had a poor appetite though no fever no night sweats.  Notably, she has a history of statin intolerance and had to stop her Lipitor 3 years ago because of \"my joints hurt\" and has been on Zocor for about a year.  Primary care provider checked a CPK and found to be markedly elevated therefore she was referred to the emergency room where he here her CPKs 2600 she will be admitted for IV fluids and further workup.  In the emergency room, she cannot move her arms above her head because of pain.    Surgical History   has a past surgical history that includes cyst excision; stent placement; open reduction; and tubal coagulation laparoscopic bilateral.     Family History  Both her parents  of strokes    Social History  She stopped smoking about 2 weeks ago she does not drink alcohol    Allergies  Allergies   Allergen Reactions   • Lisinopril " Cough       Medications  Prior to Admission Medications   Prescriptions Last Dose Informant Patient Reported? Taking?   albuterol 108 (90 Base) MCG/ACT Aero Soln inhalation aerosol >4months at PRN Patient No No   Sig: Inhale 2 Puffs by mouth every four hours as needed for Shortness of Breath.   aspirin (ASA) 325 MG TABS 1/15/2019 at PM Patient Yes No   Sig: Take 325 mg by mouth every evening.   bisoprolol (ZEBETA) 10 MG tablet 1/16/2019 at AM Patient Yes Yes   Sig: Take 10-20 mg by mouth See Admin Instructions. 10mg Monday, 20mg Tuesday, 10mg Wednesday, 20mg Thursday, 10mg Friday, 20mg Saturday, 10mg Sunday.   hydroCHLOROthiazide (HYDRODIURIL) 12.5 MG tablet 1/16/2019 at AM Patient Yes Yes   Sig: Take 12.5 mg by mouth every day.   simvastatin (ZOCOR) 80 MG tablet >2days at STOP Patient Yes Yes   Sig: Take 80 mg by mouth every evening.      Facility-Administered Medications: None       Physical Exam  Temp:  [37.1 °C (98.8 °F)] 37.1 °C (98.8 °F)  Pulse:  [70-80] 74  Resp:  [18-22] 21  BP: (100)/(61) 100/61  SpO2:  [93 %-96 %] 93 %    Physical Exam   Constitutional: She is oriented to person, place, and time. No distress.   Neck: Normal range of motion. Neck supple.   Cardiovascular: Normal rate and regular rhythm.    No murmur heard.  Pulmonary/Chest: Effort normal. No respiratory distress. She has no wheezes.   Abdominal: Soft. She exhibits no distension. There is no tenderness.   Musculoskeletal: She exhibits no edema or tenderness.   She cannot bring her arms above her head  Decreased ROM of her wrists.   No joint swelling nor redness   Neurological: She is alert and oriented to person, place, and time.   Skin: Skin is warm and dry. She is not diaphoretic.   Psychiatric: She has a normal mood and affect. Her behavior is normal.   Nursing note and vitals reviewed.      Laboratory:  Recent Labs      01/15/19   1119  01/16/19   1201   WBC  13.5*  15.4*   RBC  4.69  4.64   HEMOGLOBIN  13.1  13.0   HEMATOCRIT  38.3   37.4   MCV  81.7  80.6*   MCH  27.9  28.0   MCHC  34.2  34.8   RDW  39.7  38.5   PLATELETCT  410  391   MPV  9.0  8.7*     Recent Labs      01/15/19   1119  01/16/19   1201   SODIUM  131*  128*   POTASSIUM  3.7  3.6   CHLORIDE  96  95*   CO2  26  23   GLUCOSE  88  98   BUN  24*  25*   CREATININE  0.87  0.75   CALCIUM  8.9  8.7     Recent Labs      01/15/19   1119  01/16/19   1201   ALTSGPT  134*  138*   ASTSGOT  141*  153*   ALKPHOSPHAT  103*  93   TBILIRUBIN  0.3  0.3   GLUCOSE  88  98             Recent Labs      01/15/19   1119   TRIGLYCERIDE  296*   HDL  22*   LDL  135*     Recent Labs      01/16/19   1207   TROPONINI  0.13*       Urinalysis:    Recent Labs      01/16/19   1256   SPECGRAVITY  1.017   GLUCOSEUR  Negative   KETONES  Trace*   NITRITE  Negative   LEUKESTERAS  Negative   WBCURINE  0-2   RBCURINE  2-5*   BACTERIA  Negative   EPITHELCELL  Negative        Imaging:  No orders to display       EKG:  Per my wet read, normal sinus rhythm without ST or T wave changes  Assessment/Plan:  I anticipate this patient will require at least two midnights for appropriate medical management, necessitating inpatient admission.    * Rhabdomyolysis due to statin therapy- (present on admission)   Assessment & Plan    CPK is 2600 she is quite symptomatic  She has an intolerance to Lipitor in the past for which she stopped it and then was started on high-dose Zocor 1 year ago.  She also has associated transaminitis.  IV fluids will be given and will trend her CPK and hold her statin  She will be given oral and IV narcotic pain medications given significant pain she is experiencing     Multiple joint pain- (present on admission)   Assessment & Plan    Likely secondary to statin induced rhabdomyolysis  Is unlikely to develop a acute onset of a rheumatologic disease process at this age with such rapid onset     Elevated troponin- (present on admission)   Assessment & Plan    Troponin is elevated at 0.13  She denies chest  pain  This will be repeated in the morning     Transaminitis- (present on admission)   Assessment & Plan    She has a mild transaminitis with AST of 153 and ALT of 138 this is likely secondary to statin which is been stopped  We will repeat her liver enzymes in the morning     Hyponatremia- (present on admission)   Assessment & Plan    Sodium is 128  IV fluids will be given with sodium and will hold on her hydrochlorothiazide and repeat her level in the morning     History of MI (myocardial infarction)- (present on admission)   Assessment & Plan    History of coronary stent in 2009     Overweight with body mass index (BMI) 25.0-29.9- (present on admission)   Assessment & Plan    BMI of 29     Essential hypertension- (present on admission)   Assessment & Plan    Outpatient bisoprolol and hydrochlorothiazide have been held as her blood pressure somewhat low         VTE prophylaxis: lovenox

## 2019-01-17 NOTE — ASSESSMENT & PLAN NOTE
Sodium is mildly low and continues to fluctuate  IV fluids will be given with sodium and will hold on her hydrochlorothiazide  -check daily NA+

## 2019-01-17 NOTE — ASSESSMENT & PLAN NOTE
Outpatient bisoprolol and hydrochlorothiazide have been held as her blood pressure somewhat initially low, but improving slowly  -stop coreg given bradycardia to 20 in the AM  -could be an element of SSS  -needs outpatient holter monitoring  -start hydralazine 25 mg TID and adjust PRN

## 2019-01-17 NOTE — ED NOTES
Pt rounded on, watching tv in bed, respirations even and unlabored, repositioning self as needed, needs met.

## 2019-01-17 NOTE — ASSESSMENT & PLAN NOTE
-cannot have statins anymore  -might be a PSCK9 inhibitor candidate?  She also has associated transaminitis.  -CPK continues to down-trend  -Q12 hour CPK  -continue IVF's at 75 cc/hour, will almost certainly stop tomorrow

## 2019-01-18 LAB
ALBUMIN SERPL BCP-MCNC: 2.5 G/DL (ref 3.2–4.9)
ALBUMIN/GLOB SERPL: 0.8 G/DL
ALP SERPL-CCNC: 90 U/L (ref 30–99)
ALT SERPL-CCNC: 134 U/L (ref 2–50)
ANION GAP SERPL CALC-SCNC: 5 MMOL/L (ref 0–11.9)
AST SERPL-CCNC: 137 U/L (ref 12–45)
BASOPHILS # BLD AUTO: 0.4 % (ref 0–1.8)
BASOPHILS # BLD: 0.04 K/UL (ref 0–0.12)
BILIRUB SERPL-MCNC: 0.3 MG/DL (ref 0.1–1.5)
BUN SERPL-MCNC: 11 MG/DL (ref 8–22)
CALCIUM SERPL-MCNC: 7.8 MG/DL (ref 8.5–10.5)
CCP IGG SERPL-ACNC: 106 UNITS (ref 0–19)
CHLORIDE SERPL-SCNC: 105 MMOL/L (ref 96–112)
CK SERPL-CCNC: 3218 U/L (ref 0–154)
CO2 SERPL-SCNC: 21 MMOL/L (ref 20–33)
CREAT SERPL-MCNC: 0.63 MG/DL (ref 0.5–1.4)
EOSINOPHIL # BLD AUTO: 0.33 K/UL (ref 0–0.51)
EOSINOPHIL NFR BLD: 3.3 % (ref 0–6.9)
ERYTHROCYTE [DISTWIDTH] IN BLOOD BY AUTOMATED COUNT: 39.9 FL (ref 35.9–50)
GLOBULIN SER CALC-MCNC: 3.2 G/DL (ref 1.9–3.5)
GLUCOSE SERPL-MCNC: 93 MG/DL (ref 65–99)
HCT VFR BLD AUTO: 33.2 % (ref 37–47)
HGB BLD-MCNC: 11.4 G/DL (ref 12–16)
IMM GRANULOCYTES # BLD AUTO: 0.21 K/UL (ref 0–0.11)
IMM GRANULOCYTES NFR BLD AUTO: 2.1 % (ref 0–0.9)
LYMPHOCYTES # BLD AUTO: 1.45 K/UL (ref 1–4.8)
LYMPHOCYTES NFR BLD: 14.4 % (ref 22–41)
MAGNESIUM SERPL-MCNC: 1.5 MG/DL (ref 1.5–2.5)
MCH RBC QN AUTO: 27.9 PG (ref 27–33)
MCHC RBC AUTO-ENTMCNC: 34.3 G/DL (ref 33.6–35)
MCV RBC AUTO: 81.2 FL (ref 81.4–97.8)
MONOCYTES # BLD AUTO: 0.88 K/UL (ref 0–0.85)
MONOCYTES NFR BLD AUTO: 8.7 % (ref 0–13.4)
NEUTROPHILS # BLD AUTO: 7.16 K/UL (ref 2–7.15)
NEUTROPHILS NFR BLD: 71.1 % (ref 44–72)
NRBC # BLD AUTO: 0 K/UL
NRBC BLD-RTO: 0 /100 WBC
PLATELET # BLD AUTO: 304 K/UL (ref 164–446)
PMV BLD AUTO: 8.6 FL (ref 9–12.9)
POTASSIUM SERPL-SCNC: 4.2 MMOL/L (ref 3.6–5.5)
PROT SERPL-MCNC: 5.7 G/DL (ref 6–8.2)
RBC # BLD AUTO: 4.09 M/UL (ref 4.2–5.4)
SODIUM SERPL-SCNC: 131 MMOL/L (ref 135–145)
WBC # BLD AUTO: 10.1 K/UL (ref 4.8–10.8)

## 2019-01-18 PROCEDURE — 85025 COMPLETE CBC W/AUTO DIFF WBC: CPT

## 2019-01-18 PROCEDURE — A9270 NON-COVERED ITEM OR SERVICE: HCPCS | Performed by: INTERNAL MEDICINE

## 2019-01-18 PROCEDURE — 770020 HCHG ROOM/CARE - TELE (206)

## 2019-01-18 PROCEDURE — 700102 HCHG RX REV CODE 250 W/ 637 OVERRIDE(OP): Performed by: HOSPITALIST

## 2019-01-18 PROCEDURE — 700111 HCHG RX REV CODE 636 W/ 250 OVERRIDE (IP): Performed by: HOSPITALIST

## 2019-01-18 PROCEDURE — 700101 HCHG RX REV CODE 250: Performed by: HOSPITALIST

## 2019-01-18 PROCEDURE — A9270 NON-COVERED ITEM OR SERVICE: HCPCS | Performed by: HOSPITALIST

## 2019-01-18 PROCEDURE — 36415 COLL VENOUS BLD VENIPUNCTURE: CPT

## 2019-01-18 PROCEDURE — 700102 HCHG RX REV CODE 250 W/ 637 OVERRIDE(OP): Performed by: INTERNAL MEDICINE

## 2019-01-18 PROCEDURE — 83735 ASSAY OF MAGNESIUM: CPT

## 2019-01-18 PROCEDURE — 82550 ASSAY OF CK (CPK): CPT

## 2019-01-18 PROCEDURE — 80053 COMPREHEN METABOLIC PANEL: CPT

## 2019-01-18 PROCEDURE — 99232 SBSQ HOSP IP/OBS MODERATE 35: CPT | Performed by: INTERNAL MEDICINE

## 2019-01-18 RX ORDER — CARVEDILOL 3.12 MG/1
3.12 TABLET ORAL 2 TIMES DAILY WITH MEALS
Status: DISCONTINUED | OUTPATIENT
Start: 2019-01-18 | End: 2019-01-19

## 2019-01-18 RX ADMIN — ASPIRIN 325 MG: 325 TABLET ORAL at 17:31

## 2019-01-18 RX ADMIN — ENOXAPARIN SODIUM 40 MG: 100 INJECTION SUBCUTANEOUS at 05:08

## 2019-01-18 RX ADMIN — POTASSIUM CHLORIDE AND SODIUM CHLORIDE: 900; 150 INJECTION, SOLUTION INTRAVENOUS at 14:50

## 2019-01-18 RX ADMIN — CARVEDILOL 3.12 MG: 3.12 TABLET, FILM COATED ORAL at 17:31

## 2019-01-18 RX ADMIN — POTASSIUM CHLORIDE AND SODIUM CHLORIDE: 900; 150 INJECTION, SOLUTION INTRAVENOUS at 06:34

## 2019-01-18 RX ADMIN — POTASSIUM CHLORIDE AND SODIUM CHLORIDE: 900; 150 INJECTION, SOLUTION INTRAVENOUS at 21:10

## 2019-01-18 ASSESSMENT — PATIENT HEALTH QUESTIONNAIRE - PHQ9
SUM OF ALL RESPONSES TO PHQ9 QUESTIONS 1 AND 2: 0
2. FEELING DOWN, DEPRESSED, IRRITABLE, OR HOPELESS: NOT AT ALL
1. LITTLE INTEREST OR PLEASURE IN DOING THINGS: NOT AT ALL

## 2019-01-18 ASSESSMENT — ENCOUNTER SYMPTOMS
SHORTNESS OF BREATH: 0
FEVER: 0
CHILLS: 0
MYALGIAS: 1
HEADACHES: 0
DIZZINESS: 0
ABDOMINAL PAIN: 0
BLURRED VISION: 0
WEAKNESS: 1

## 2019-01-18 ASSESSMENT — PAIN SCALES - GENERAL
PAINLEVEL_OUTOF10: 2
PAINLEVEL_OUTOF10: 2

## 2019-01-18 NOTE — PROGRESS NOTES
Hospital Medicine Daily Progress Note    Date of Service  1/18/2019    Chief Complaint  68 y.o. female admitted 1/16/2019 with rhabdomyolysis presumed 2/2 statin use    Hospital Course  See below    Interval Problem Update  Rhabdo-muscle weakness is improved, but pain is even more improved today. CPK continues to rise though, urine output remains robust.     Elevated LFTS-have down-trended today. AB us shows likely fatty liver.     TELE last night showed SR with 1st degree AV block, rare PVC's.    Consultants/Specialty  none    Code Status  fc/fc    Disposition  TELE then home    Review of Systems  Review of Systems   Constitutional: Positive for malaise/fatigue (improving today). Negative for chills and fever.   Eyes: Negative for blurred vision.   Respiratory: Negative for shortness of breath.    Gastrointestinal: Negative for abdominal pain.   Genitourinary: Positive for frequency. Negative for dysuria, hematuria and urgency.        Urinating quite well today   Musculoskeletal: Positive for myalgias (decreased intensity).   Skin: Negative for rash.   Neurological: Positive for weakness (slow progress). Negative for dizziness and headaches.   All other systems reviewed and are negative.       Physical Exam  Temp:  [37.1 °C (98.8 °F)-37.9 °C (100.3 °F)] 37.6 °C (99.6 °F)  Pulse:  [] 92  Resp:  [16-20] 20  BP: (125-152)/() 135/78  SpO2:  [92 %-97 %] 97 %    Physical Exam   Constitutional: She is oriented to person, place, and time. She appears well-developed and well-nourished.   Appears comfortable today   HENT:   Head: Normocephalic and atraumatic.   Mouth/Throat: No oropharyngeal exudate.   Eyes: Pupils are equal, round, and reactive to light. No scleral icterus.   Neck: Normal range of motion. Neck supple.   Cardiovascular: Normal rate, regular rhythm, normal heart sounds and intact distal pulses.  Exam reveals no gallop.    Pulmonary/Chest: Effort normal and breath sounds normal. No stridor. No  respiratory distress. She has no rales.   Abdominal: Soft. Bowel sounds are normal. She exhibits no distension.   Musculoskeletal: Normal range of motion. She exhibits tenderness (throughout and diffuse, but less intense). She exhibits no edema.   Neurological: She is alert and oriented to person, place, and time. Coordination normal.   Skin: Skin is warm and dry. No rash noted.   Psychiatric: She has a normal mood and affect.   Nursing note and vitals reviewed.      Fluids    Intake/Output Summary (Last 24 hours) at 01/18/19 1344  Last data filed at 01/18/19 1306   Gross per 24 hour   Intake           6117.5 ml   Output                0 ml   Net           6117.5 ml       Laboratory  Recent Labs      01/16/19   1201  01/18/19   0501   WBC  15.4*  10.1   RBC  4.64  4.09*   HEMOGLOBIN  13.0  11.4*   HEMATOCRIT  37.4  33.2*   MCV  80.6*  81.2*   MCH  28.0  27.9   MCHC  34.8  34.3   RDW  38.5  39.9   PLATELETCT  391  304   MPV  8.7*  8.6*     Recent Labs      01/16/19   1201  01/17/19   0106  01/18/19   0501   SODIUM  128*  130*  131*   POTASSIUM  3.6  3.5*  4.2   CHLORIDE  95*  98  105   CO2  23  24  21   GLUCOSE  98  89  93   BUN  25*  22  11   CREATININE  0.75  0.78  0.63   CALCIUM  8.7  8.0*  7.8*                   Imaging  US-ABDOMEN COMPLETE SURVEY   Final Result      1.  Echogenic liver suggesting fatty infiltration or fibrosis.   2.  Cholelithiasis without evidence for acute cholecystitis or biliary obstruction.           Assessment/Plan  * Rhabdomyolysis due to statin therapy- (present on admission)   Assessment & Plan    -cannot have statins anymore  -might be a PSCK9 inhibitor candidate?  She also has associated transaminitis.  -continue current IV fluid rate, CPK is still  Rising, do Q12 hour CPK and continue IVF's until peaks and then slightly down-trends  She will be given oral and IV narcotic pain medications given significant pain she is experiencing     Multiple joint pain- (present on admission)    Assessment & Plan    Likely secondary to statin induced rhabdomyolysis  Is unlikely to develop a acute onset of a rheumatologic disease process at this age with such rapid onset     Elevated troponin- (present on admission)   Assessment & Plan    Troponin is now 0.17, but no chest pain, no tele issues, no acute 12-lead EKG changes  -likely from rhabdo  -down-trended, no chest pain, stop trending  -monitor closely     Transaminitis- (present on admission)   Assessment & Plan    -levels are slowly coming down  -ALT is elevated which might not be due to rhabdo  -trend closely  -HEP panel Is negative  -check AB us, which shows fatty liver     Hyponatremia- (present on admission)   Assessment & Plan    Sodium continue s to climb upwards  IV fluids will be given with sodium and will hold on her hydrochlorothiazide  -check daily NA+     History of MI (myocardial infarction)- (present on admission)   Assessment & Plan    History of coronary stent in 2009     Overweight with body mass index (BMI) 25.0-29.9- (present on admission)   Assessment & Plan    BMI of 29     Essential hypertension- (present on admission)   Assessment & Plan    Outpatient bisoprolol and hydrochlorothiazide have been held as her blood pressure somewhat initially low, but improving slowly  -will restart bystolic with coreg, on formulary at lower dose          VTE prophylaxis: lovenox

## 2019-01-18 NOTE — PROGRESS NOTES
Assumed care at 1900. Bedside report received from ALMA Sarabia. Patient's chart and MAR reviewed. Pt denies pain at this time. Pt is A & O x4. Patient was updated on plan of care for the day. Questions answered and concerns addressed.  Pt denies any additional needs at this time. White board updated. Call light, phone and personal belongings within reach.

## 2019-01-18 NOTE — PROGRESS NOTES
NSR 79-86  0.18/0.08/0.38    Transient possible 2nd degree heart block this morning  HR christina to 33 with 2.3 sec pause  EKG-NSR

## 2019-01-18 NOTE — CARE PLAN
Problem: Safety  Goal: Will remain free from injury  Outcome: PROGRESSING AS EXPECTED  Pt will remain free from falls or injury.    Problem: Infection  Goal: Will remain free from infection  Outcome: PROGRESSING AS EXPECTED  Pt will remain free from s/sx of infection.

## 2019-01-18 NOTE — CARE PLAN
Problem: Communication  Goal: The ability to communicate needs accurately and effectively will improve  Outcome: PROGRESSING AS EXPECTED  Pt is able to communicate needs appropriately. Call light within reach.        Problem: Infection  Goal: Will remain free from infection  Outcome: PROGRESSING AS EXPECTED  Remains free from signs and symptoms of infection. Standard precautions implemented. Hand hygiene before and after contact with pt. Educated about importance of hand hygiene.

## 2019-01-18 NOTE — PROGRESS NOTES
"0705: Bedside report received.  Pt OOB to chair.  C/o a dry cough--pt states she has a history of asthma and it usually bothers her in \"dry air\" or when \"fans cycle dry air\".  Denies any pain.  POC discussed.  Call light and personal belongings within reach.  Bed locked in lowest position.  Pt educated to call for assistance as needed.    No significant events throughout the day.  Pt remains fatigued with generalized pain--yet pt reports pain improving.   "

## 2019-01-19 ENCOUNTER — APPOINTMENT (OUTPATIENT)
Dept: RADIOLOGY | Facility: MEDICAL CENTER | Age: 69
DRG: 558 | End: 2019-01-19
Attending: INTERNAL MEDICINE
Payer: MEDICARE

## 2019-01-19 ENCOUNTER — APPOINTMENT (OUTPATIENT)
Dept: CARDIOLOGY | Facility: MEDICAL CENTER | Age: 69
DRG: 558 | End: 2019-01-19
Attending: INTERNAL MEDICINE
Payer: MEDICARE

## 2019-01-19 PROBLEM — R60.9 PERIPHERAL EDEMA: Status: ACTIVE | Noted: 2019-01-19

## 2019-01-19 PROBLEM — R60.0 PERIPHERAL EDEMA: Status: ACTIVE | Noted: 2019-01-19

## 2019-01-19 LAB
ALBUMIN SERPL BCP-MCNC: 2.7 G/DL (ref 3.2–4.9)
ALBUMIN/GLOB SERPL: 0.9 G/DL
ALP SERPL-CCNC: 96 U/L (ref 30–99)
ALT SERPL-CCNC: 147 U/L (ref 2–50)
ANION GAP SERPL CALC-SCNC: 8 MMOL/L (ref 0–11.9)
AST SERPL-CCNC: 152 U/L (ref 12–45)
BILIRUB SERPL-MCNC: 0.3 MG/DL (ref 0.1–1.5)
BUN SERPL-MCNC: 7 MG/DL (ref 8–22)
CALCIUM SERPL-MCNC: 8 MG/DL (ref 8.5–10.5)
CHLORIDE SERPL-SCNC: 106 MMOL/L (ref 96–112)
CK SERPL-CCNC: 3518 U/L (ref 0–154)
CK SERPL-CCNC: 4740 U/L (ref 0–154)
CO2 SERPL-SCNC: 18 MMOL/L (ref 20–33)
CREAT SERPL-MCNC: 0.6 MG/DL (ref 0.5–1.4)
GLOBULIN SER CALC-MCNC: 3.1 G/DL (ref 1.9–3.5)
GLUCOSE SERPL-MCNC: 95 MG/DL (ref 65–99)
LV EJECT FRACT  99904: 65
LV EJECT FRACT MOD 2C 99903: 70.22
LV EJECT FRACT MOD 4C 99902: 58.46
LV EJECT FRACT MOD BP 99901: 64.25
MAGNESIUM SERPL-MCNC: 1.5 MG/DL (ref 1.5–2.5)
POTASSIUM SERPL-SCNC: 4.2 MMOL/L (ref 3.6–5.5)
PROT SERPL-MCNC: 5.8 G/DL (ref 6–8.2)
SODIUM SERPL-SCNC: 132 MMOL/L (ref 135–145)

## 2019-01-19 PROCEDURE — 99232 SBSQ HOSP IP/OBS MODERATE 35: CPT | Performed by: INTERNAL MEDICINE

## 2019-01-19 PROCEDURE — 86162 COMPLEMENT TOTAL (CH50): CPT

## 2019-01-19 PROCEDURE — 700105 HCHG RX REV CODE 258: Performed by: HOSPITALIST

## 2019-01-19 PROCEDURE — 700101 HCHG RX REV CODE 250: Performed by: INTERNAL MEDICINE

## 2019-01-19 PROCEDURE — 93306 TTE W/DOPPLER COMPLETE: CPT

## 2019-01-19 PROCEDURE — 93306 TTE W/DOPPLER COMPLETE: CPT | Mod: 26 | Performed by: INTERNAL MEDICINE

## 2019-01-19 PROCEDURE — 36415 COLL VENOUS BLD VENIPUNCTURE: CPT

## 2019-01-19 PROCEDURE — 86431 RHEUMATOID FACTOR QUANT: CPT

## 2019-01-19 PROCEDURE — A9270 NON-COVERED ITEM OR SERVICE: HCPCS | Performed by: INTERNAL MEDICINE

## 2019-01-19 PROCEDURE — 82550 ASSAY OF CK (CPK): CPT | Mod: 91

## 2019-01-19 PROCEDURE — 71046 X-RAY EXAM CHEST 2 VIEWS: CPT

## 2019-01-19 PROCEDURE — 82085 ASSAY OF ALDOLASE: CPT

## 2019-01-19 PROCEDURE — 700102 HCHG RX REV CODE 250 W/ 637 OVERRIDE(OP): Performed by: INTERNAL MEDICINE

## 2019-01-19 PROCEDURE — A9270 NON-COVERED ITEM OR SERVICE: HCPCS | Performed by: HOSPITALIST

## 2019-01-19 PROCEDURE — 86038 ANTINUCLEAR ANTIBODIES: CPT

## 2019-01-19 PROCEDURE — 770020 HCHG ROOM/CARE - TELE (206)

## 2019-01-19 PROCEDURE — 73120 X-RAY EXAM OF HAND: CPT | Mod: RT

## 2019-01-19 PROCEDURE — 700111 HCHG RX REV CODE 636 W/ 250 OVERRIDE (IP): Performed by: HOSPITALIST

## 2019-01-19 PROCEDURE — 80053 COMPREHEN METABOLIC PANEL: CPT

## 2019-01-19 PROCEDURE — 700102 HCHG RX REV CODE 250 W/ 637 OVERRIDE(OP): Performed by: HOSPITALIST

## 2019-01-19 PROCEDURE — 86039 ANTINUCLEAR ANTIBODIES (ANA): CPT

## 2019-01-19 PROCEDURE — 83735 ASSAY OF MAGNESIUM: CPT

## 2019-01-19 PROCEDURE — 700101 HCHG RX REV CODE 250: Performed by: HOSPITALIST

## 2019-01-19 PROCEDURE — 86200 CCP ANTIBODY: CPT | Mod: 91

## 2019-01-19 RX ORDER — CARVEDILOL 12.5 MG/1
12.5 TABLET ORAL 2 TIMES DAILY WITH MEALS
Status: DISCONTINUED | OUTPATIENT
Start: 2019-01-19 | End: 2019-01-21

## 2019-01-19 RX ORDER — SODIUM CHLORIDE 9 MG/ML
1000 INJECTION, SOLUTION INTRAVENOUS CONTINUOUS
Status: DISCONTINUED | OUTPATIENT
Start: 2019-01-19 | End: 2019-01-20

## 2019-01-19 RX ADMIN — SODIUM CHLORIDE 1000 ML: 9 INJECTION, SOLUTION INTRAVENOUS at 22:50

## 2019-01-19 RX ADMIN — POTASSIUM CHLORIDE AND SODIUM CHLORIDE: 900; 150 INJECTION, SOLUTION INTRAVENOUS at 17:50

## 2019-01-19 RX ADMIN — CARVEDILOL 12.5 MG: 12.5 TABLET, FILM COATED ORAL at 17:50

## 2019-01-19 RX ADMIN — ENOXAPARIN SODIUM 40 MG: 100 INJECTION SUBCUTANEOUS at 05:31

## 2019-01-19 RX ADMIN — CARVEDILOL 3.12 MG: 3.12 TABLET, FILM COATED ORAL at 05:31

## 2019-01-19 RX ADMIN — ASPIRIN 325 MG: 325 TABLET ORAL at 17:50

## 2019-01-19 RX ADMIN — POTASSIUM CHLORIDE AND SODIUM CHLORIDE: 900; 150 INJECTION, SOLUTION INTRAVENOUS at 05:36

## 2019-01-19 ASSESSMENT — ENCOUNTER SYMPTOMS
SHORTNESS OF BREATH: 1
FEVER: 0
NAUSEA: 0
DIZZINESS: 0
HEADACHES: 0
CHILLS: 0
VOMITING: 0
MYALGIAS: 1
WEAKNESS: 1
DOUBLE VISION: 0

## 2019-01-19 ASSESSMENT — PAIN SCALES - GENERAL
PAINLEVEL_OUTOF10: 0

## 2019-01-19 NOTE — PROGRESS NOTES
Received report from Uli, at pt bedside. Pt CPK increased to 3578 will address with MD this morning, holding statin for Rabdomylosis, POC discussed. Call light and belongings within reach. Bed locked and in low position. Alarm on and fall precautions in place.

## 2019-01-19 NOTE — PROGRESS NOTES
@9813 MD Vincent paged for critical CPK 9137, the CPK continues an upward trend. Per MD no new orders or labs at this time. Will re-evaluate with day coverage. Continue to monitor.

## 2019-01-19 NOTE — ASSESSMENT & PLAN NOTE
-presumed 2/2 IVF's for rhabdo  -check Aldolase, SHELIA, anti-CCP, RF, and BNP  -check ECHO and CXR Pa lateral-both normal  -no diuretics at this time

## 2019-01-19 NOTE — CARE PLAN
Problem: Communication  Goal: The ability to communicate needs accurately and effectively will improve  Outcome: PROGRESSING AS EXPECTED  Pt educated on POC, allowed pt time to ask questions, addressed pt concerns.    Problem: Safety  Goal: Will remain free from injury  Outcome: PROGRESSING AS EXPECTED  Pt educated to call before getting out of bed, call light and belongings are within reach. Non skid socks on, bed alarm on.     Problem: Knowledge Deficit  Goal: Knowledge of disease process/condition, treatment plan, diagnostic tests, and medications will improve  Outcome: PROGRESSING AS EXPECTED  Pt educated on POC, treatment, and diagnostic tests.

## 2019-01-19 NOTE — PROGRESS NOTES
[facility] Medicine Daily Progress Note    Date of Service  1/19/2019    Chief Complaint  68 y.o. female admitted 1/16/2019 with rhabdomyolysis presumed 2/2 statin use    Hospital Course  See below    Interval Problem Update  Rhabdo-muscle weakness is better today, but started developing hand and foot edema last night. Mild SOB as well. Decreased IVF's to 100 mL/hour last night. Patient endorses h/o OA and possible RA nodule in her R ankle.      Elevated LFTS-remains about the same today, denies any pain in the RUQ.    TELE last night showed ST low 100's with no ectopy. Denies any chest pain    Consultants/Specialty  none    Code Status  fc/fc    Disposition  TELE then home    Review of Systems  Review of Systems   Constitutional: Negative for chills and fever.   Eyes: Negative for double vision.   Respiratory: Positive for shortness of breath (mild).    Cardiovascular: Positive for leg swelling (and hand swelling).   Gastrointestinal: Negative for nausea and vomiting.   Genitourinary: Positive for frequency. Negative for dysuria and urgency.        Urinating quite well today   Musculoskeletal: Positive for myalgias (decreased intensity).   Skin: Negative for rash.   Neurological: Positive for weakness (very mild improvement). Negative for dizziness and headaches.   All other systems reviewed and are negative.       Physical Exam  Temp:  [36.9 °C (98.5 °F)-37.7 °C (99.8 °F)] 37.4 °C (99.3 °F)  Pulse:  [] 95  Resp:  [16-20] 16  BP: (146-162)/(68-86) 162/85  SpO2:  [91 %-96 %] 93 %    Physical Exam   Constitutional: She is oriented to person, place, and time. She appears well-developed and well-nourished.   Appears comfortable today   HENT:   Head: Normocephalic and atraumatic.   Mouth/Throat: No oropharyngeal exudate.   Eyes: Pupils are equal, round, and reactive to light. Right eye exhibits no discharge. Left eye exhibits no discharge.   Neck: Normal range of motion. Neck supple.   Cardiovascular: Normal rate,  regular rhythm, normal heart sounds and intact distal pulses.  Exam reveals no gallop.    Pulmonary/Chest: Effort normal and breath sounds normal. No stridor. No respiratory distress. She has no wheezes. She has no rales.   Speaking in full sentences  Good aeration throughout   Abdominal: Soft. Bowel sounds are normal. There is no tenderness.   Musculoskeletal: Normal range of motion. She exhibits edema (hand to the wrist and pedal edema B/L, diffuse, no overlying erythema or warmth) and tenderness (throughout and diffuse, but less intense).   Neurological: She is alert and oriented to person, place, and time. Coordination normal.   Skin: Skin is warm and dry. No rash noted.   No open skin lesions   Psychiatric: She has a normal mood and affect.   Nursing note and vitals reviewed.      Fluids    Intake/Output Summary (Last 24 hours) at 01/19/19 1407  Last data filed at 01/19/19 0900   Gross per 24 hour   Intake           4292.5 ml   Output                0 ml   Net           4292.5 ml       Laboratory  Recent Labs      01/18/19   0501   WBC  10.1   RBC  4.09*   HEMOGLOBIN  11.4*   HEMATOCRIT  33.2*   MCV  81.2*   MCH  27.9   MCHC  34.3   RDW  39.9   PLATELETCT  304   MPV  8.6*     Recent Labs      01/17/19   0106  01/18/19   0501  01/19/19   0108   SODIUM  130*  131*  132*   POTASSIUM  3.5*  4.2  4.2   CHLORIDE  98  105  106   CO2  24  21  18*   GLUCOSE  89  93  95   BUN  22  11  7*   CREATININE  0.78  0.63  0.60   CALCIUM  8.0*  7.8*  8.0*                   Imaging  DX-HAND 2- RIGHT   Final Result      1.  No right hand fracture or dislocation.      2.  Mild diffuse degenerative changes as described.      EC-ECHOCARDIOGRAM COMPLETE W/O CONT   Final Result      US-ABDOMEN COMPLETE SURVEY   Final Result      1.  Echogenic liver suggesting fatty infiltration or fibrosis.   2.  Cholelithiasis without evidence for acute cholecystitis or biliary obstruction.      DX-CHEST-2 VIEWS    (Results Pending)         Assessment/Plan  * Rhabdomyolysis due to statin therapy- (present on admission)   Assessment & Plan    -cannot have statins anymore  -might be a PSCK9 inhibitor candidate?  She also has associated transaminitis.  -CPK continues to slowly climb and has not reached its zenith  -Q12 hour CPK  -now dealing with possible mild iatrogenic swelling, reduce IVFs to NS with 20 meq KCL at 83 mL/Hour  She will be given oral and IV narcotic pain medications given significant pain she is experiencing     Multiple joint pain- (present on admission)   Assessment & Plan    Likely secondary to statin induced rhabdomyolysis  Is unlikely to develop a acute onset of a rheumatologic disease process at this age with such rapid onset  -see above further work up  -check Xray 2 view of the R wrist     Peripheral edema   Assessment & Plan    -presumed 2/2 IVF's for rhabdo  -check Aldolase, SHELIA, anti-CCP, RF, and BNP  -check ECHO and CXR Pa lateral  -no diuretics at this time     Elevated troponin- (present on admission)   Assessment & Plan    Troponin is now 0.17, but no chest pain, no tele issues, no acute 12-lead EKG changes  -likely from rhabdo  -down-trended, no chest pain, stop trending  -monitor closely     Transaminitis- (present on admission)   Assessment & Plan    -levels remain about the same today, see below for clinical adjustments  -ALT is elevated which might not be due to rhabdo  -trend closely  -HEP panel Is negative  -check AB us, which shows fatty liver     Hyponatremia- (present on admission)   Assessment & Plan    Sodium is mildly low and continues to fluctuate  IV fluids will be given with sodium and will hold on her hydrochlorothiazide  -check daily NA+     History of MI (myocardial infarction)- (present on admission)   Assessment & Plan    History of coronary stent in 2009     Overweight with body mass index (BMI) 25.0-29.9- (present on admission)   Assessment & Plan    BMI of 29     Essential hypertension- (present  on admission)   Assessment & Plan    Outpatient bisoprolol and hydrochlorothiazide have been held as her blood pressure somewhat initially low, but improving slowly  -will restart bystolic with coreg and increase dose further today given persistently hypertensive, on formulary at lower dose          VTE prophylaxis: lovenox

## 2019-01-19 NOTE — PROGRESS NOTES
@2130 MD Vincent notified for new fine crackles b/l lower lobes, and trace edema b/l lower extremities.  Telephone order to reduce infusion to 100mL/hr. Will continue to assess.

## 2019-01-20 PROBLEM — M06.9 RHEUMATOID ARTHRITIS INVOLVING MULTIPLE SITES (HCC): Status: ACTIVE | Noted: 2018-12-13

## 2019-01-20 LAB
ALBUMIN SERPL BCP-MCNC: 2.2 G/DL (ref 3.2–4.9)
ALBUMIN/GLOB SERPL: 0.8 G/DL
ALP SERPL-CCNC: 94 U/L (ref 30–99)
ALT SERPL-CCNC: 147 U/L (ref 2–50)
ANION GAP SERPL CALC-SCNC: 8 MMOL/L (ref 0–11.9)
AST SERPL-CCNC: 153 U/L (ref 12–45)
BASOPHILS # BLD AUTO: 0.6 % (ref 0–1.8)
BASOPHILS # BLD: 0.06 K/UL (ref 0–0.12)
BILIRUB SERPL-MCNC: 0.3 MG/DL (ref 0.1–1.5)
BNP SERPL-MCNC: 80 PG/ML (ref 0–100)
BUN SERPL-MCNC: 7 MG/DL (ref 8–22)
CALCIUM SERPL-MCNC: 7.6 MG/DL (ref 8.5–10.5)
CHLORIDE SERPL-SCNC: 104 MMOL/L (ref 96–112)
CK SERPL-CCNC: 3446 U/L (ref 0–154)
CK SERPL-CCNC: 4053 U/L (ref 0–154)
CO2 SERPL-SCNC: 19 MMOL/L (ref 20–33)
CREAT SERPL-MCNC: 0.57 MG/DL (ref 0.5–1.4)
EKG IMPRESSION: NORMAL
EOSINOPHIL # BLD AUTO: 0.38 K/UL (ref 0–0.51)
EOSINOPHIL NFR BLD: 3.5 % (ref 0–6.9)
ERYTHROCYTE [DISTWIDTH] IN BLOOD BY AUTOMATED COUNT: 42.8 FL (ref 35.9–50)
GLOBULIN SER CALC-MCNC: 2.9 G/DL (ref 1.9–3.5)
GLUCOSE SERPL-MCNC: 162 MG/DL (ref 65–99)
HCT VFR BLD AUTO: 30.7 % (ref 37–47)
HGB BLD-MCNC: 10.4 G/DL (ref 12–16)
IMM GRANULOCYTES # BLD AUTO: 0.13 K/UL (ref 0–0.11)
IMM GRANULOCYTES NFR BLD AUTO: 1.2 % (ref 0–0.9)
LYMPHOCYTES # BLD AUTO: 1.32 K/UL (ref 1–4.8)
LYMPHOCYTES NFR BLD: 12.3 % (ref 22–41)
MCH RBC QN AUTO: 28.2 PG (ref 27–33)
MCHC RBC AUTO-ENTMCNC: 33.9 G/DL (ref 33.6–35)
MCV RBC AUTO: 83.2 FL (ref 81.4–97.8)
MONOCYTES # BLD AUTO: 0.65 K/UL (ref 0–0.85)
MONOCYTES NFR BLD AUTO: 6 % (ref 0–13.4)
NEUTROPHILS # BLD AUTO: 8.21 K/UL (ref 2–7.15)
NEUTROPHILS NFR BLD: 76.4 % (ref 44–72)
NRBC # BLD AUTO: 0 K/UL
NRBC BLD-RTO: 0 /100 WBC
PLATELET # BLD AUTO: 326 K/UL (ref 164–446)
PMV BLD AUTO: 8.9 FL (ref 9–12.9)
POTASSIUM SERPL-SCNC: 3.8 MMOL/L (ref 3.6–5.5)
PROT SERPL-MCNC: 5.1 G/DL (ref 6–8.2)
RBC # BLD AUTO: 3.69 M/UL (ref 4.2–5.4)
SODIUM SERPL-SCNC: 131 MMOL/L (ref 135–145)
WBC # BLD AUTO: 10.8 K/UL (ref 4.8–10.8)

## 2019-01-20 PROCEDURE — 83880 ASSAY OF NATRIURETIC PEPTIDE: CPT

## 2019-01-20 PROCEDURE — A9270 NON-COVERED ITEM OR SERVICE: HCPCS | Performed by: INTERNAL MEDICINE

## 2019-01-20 PROCEDURE — 700102 HCHG RX REV CODE 250 W/ 637 OVERRIDE(OP): Performed by: HOSPITALIST

## 2019-01-20 PROCEDURE — 700105 HCHG RX REV CODE 258: Performed by: INTERNAL MEDICINE

## 2019-01-20 PROCEDURE — 700105 HCHG RX REV CODE 258: Performed by: HOSPITALIST

## 2019-01-20 PROCEDURE — 700111 HCHG RX REV CODE 636 W/ 250 OVERRIDE (IP): Performed by: HOSPITALIST

## 2019-01-20 PROCEDURE — 82550 ASSAY OF CK (CPK): CPT

## 2019-01-20 PROCEDURE — 93005 ELECTROCARDIOGRAM TRACING: CPT | Performed by: INTERNAL MEDICINE

## 2019-01-20 PROCEDURE — 700102 HCHG RX REV CODE 250 W/ 637 OVERRIDE(OP): Performed by: INTERNAL MEDICINE

## 2019-01-20 PROCEDURE — 36415 COLL VENOUS BLD VENIPUNCTURE: CPT

## 2019-01-20 PROCEDURE — 93010 ELECTROCARDIOGRAM REPORT: CPT | Performed by: INTERNAL MEDICINE

## 2019-01-20 PROCEDURE — 770020 HCHG ROOM/CARE - TELE (206)

## 2019-01-20 PROCEDURE — 85025 COMPLETE CBC W/AUTO DIFF WBC: CPT

## 2019-01-20 PROCEDURE — 80053 COMPREHEN METABOLIC PANEL: CPT

## 2019-01-20 PROCEDURE — 99233 SBSQ HOSP IP/OBS HIGH 50: CPT | Performed by: INTERNAL MEDICINE

## 2019-01-20 PROCEDURE — A9270 NON-COVERED ITEM OR SERVICE: HCPCS | Performed by: HOSPITALIST

## 2019-01-20 RX ORDER — SODIUM CHLORIDE 9 MG/ML
1000 INJECTION, SOLUTION INTRAVENOUS CONTINUOUS
Status: DISPENSED | OUTPATIENT
Start: 2019-01-20 | End: 2019-01-20

## 2019-01-20 RX ADMIN — ASPIRIN 325 MG: 325 TABLET ORAL at 17:15

## 2019-01-20 RX ADMIN — CARVEDILOL 12.5 MG: 12.5 TABLET, FILM COATED ORAL at 05:44

## 2019-01-20 RX ADMIN — CARVEDILOL 12.5 MG: 12.5 TABLET, FILM COATED ORAL at 17:15

## 2019-01-20 RX ADMIN — ENOXAPARIN SODIUM 40 MG: 100 INJECTION SUBCUTANEOUS at 05:45

## 2019-01-20 RX ADMIN — SODIUM CHLORIDE 1000 ML: 9 INJECTION, SOLUTION INTRAVENOUS at 08:00

## 2019-01-20 RX ADMIN — SODIUM CHLORIDE 1000 ML: 9 INJECTION, SOLUTION INTRAVENOUS at 14:22

## 2019-01-20 RX ADMIN — SODIUM CHLORIDE 1000 ML: 9 INJECTION, SOLUTION INTRAVENOUS at 05:48

## 2019-01-20 ASSESSMENT — ENCOUNTER SYMPTOMS
SHORTNESS OF BREATH: 0
NAUSEA: 0
WEAKNESS: 1
VOMITING: 0
BLURRED VISION: 0
PALPITATIONS: 0
MYALGIAS: 1
HEADACHES: 0
DIZZINESS: 0
FEVER: 0

## 2019-01-20 ASSESSMENT — PAIN SCALES - GENERAL
PAINLEVEL_OUTOF10: 0
PAINLEVEL_OUTOF10: 1

## 2019-01-20 NOTE — PROGRESS NOTES
Assumed care at 1900. Bedside report received from ALMA Finley. Patient's chart and MAR reviewed. Pt denies pain at this time. Pt is A & O x4. Patient was updated on plan of care for the day. Questions answered and concerns addressed.  Pt denies any additional needs at this time. White board updated. Call light, phone and personal belongings within reach.

## 2019-01-20 NOTE — PROGRESS NOTES
Hospital Medicine Daily Progress Note    Date of Service  1/20/2019    Chief Complaint  68 y.o. female admitted 1/16/2019 with rhabdomyolysis presumed 2/2 statin use    Hospital Course  See below    Interval Problem Update  Rhabdo-muscle weakness remains about the same, swelling in r hand and l foot continues to worsen and does endorse some joint pain. CPK now downtrending. Fluids transiently increased last night.    Joint pain-anti CCP elevated, given other clinical exam findings, concerning for RA.    Elevated LFTS-no change, denies abdominal pain    No issues on tele last night except for transient bradycardia. 12-lead EKG personally reviewed SR, HR 89, no e/o acute ST segment changes noted.    Consultants/Specialty  none    Code Status  fc/fc    Disposition  TELE then home    Review of Systems  Review of Systems   Constitutional: Negative for fever.   Eyes: Negative for blurred vision.   Respiratory: Negative for shortness of breath.    Cardiovascular: Positive for leg swelling (improved in the R foot, but worse ini the R hand). Negative for chest pain and palpitations.   Gastrointestinal: Negative for nausea and vomiting.   Genitourinary: Positive for frequency. Negative for dysuria and urgency.        Urinating quite well today   Musculoskeletal: Positive for joint pain and myalgias (further decrease today).   Skin: Negative for rash.   Neurological: Positive for weakness (more strength today). Negative for dizziness and headaches.   All other systems reviewed and are negative.       Physical Exam  Temp:  [36.8 °C (98.2 °F)-37.6 °C (99.7 °F)] 37.6 °C (99.7 °F)  Pulse:  [20-97] 93  Resp:  [] 189  BP: (103-178)/() 106/63  SpO2:  [92 %-96 %] 94 %    Physical Exam   Constitutional: She is oriented to person, place, and time. She appears well-developed and well-nourished.   Appears comfortable today   HENT:   Head: Normocephalic and atraumatic.   Mouth/Throat: No oropharyngeal exudate.   Eyes: Pupils  are equal, round, and reactive to light. No scleral icterus.   Neck: Normal range of motion. Neck supple.   Cardiovascular: Normal rate, regular rhythm, normal heart sounds and intact distal pulses.  Exam reveals no gallop.    Pulmonary/Chest: Effort normal and breath sounds normal. No stridor. She has no rales.   Speaking in full sentences  Good aeration throughout  Remains unchanged today   Abdominal: Soft. Bowel sounds are normal. She exhibits no distension.   Musculoskeletal: Normal range of motion. She exhibits edema (hand to the wrist and pedal edema B/L, diffuse, no overlying erythema or warmth, more concentrated over the MCP of the R hand) and tenderness (throughout and diffuse, but less intense).   Neurological: She is alert and oriented to person, place, and time. Coordination normal.   Skin: Skin is warm and dry. No rash noted.   No open skin lesions   Psychiatric: She has a normal mood and affect.   Nursing note and vitals reviewed.      Fluids    Intake/Output Summary (Last 24 hours) at 01/20/19 1438  Last data filed at 01/20/19 0700   Gross per 24 hour   Intake          3795.39 ml   Output             1400 ml   Net          2395.39 ml       Laboratory  Recent Labs      01/18/19   0501  01/20/19   0914   WBC  10.1  10.8   RBC  4.09*  3.69*   HEMOGLOBIN  11.4*  10.4*   HEMATOCRIT  33.2*  30.7*   MCV  81.2*  83.2   MCH  27.9  28.2   MCHC  34.3  33.9   RDW  39.9  42.8   PLATELETCT  304  326   MPV  8.6*  8.9*     Recent Labs      01/18/19   0501  01/19/19   0108  01/20/19   0914   SODIUM  131*  132*  131*   POTASSIUM  4.2  4.2  3.8   CHLORIDE  105  106  104   CO2  21  18*  19*   GLUCOSE  93  95  162*   BUN  11  7*  7*   CREATININE  0.63  0.60  0.57   CALCIUM  7.8*  8.0*  7.6*         Recent Labs      01/20/19   0914   BNPBTYPENAT  80           Imaging  DX-CHEST-2 VIEWS   Final Result      Interstitial opacities may represent dependent edema or pneumonitis.      Possible 4 mm nodular opacity in the right  upper lobe. Short interval follow-up is recommended.      DX-HAND 2- RIGHT   Final Result      1.  No right hand fracture or dislocation.      2.  Mild diffuse degenerative changes as described.      EC-ECHOCARDIOGRAM COMPLETE W/O CONT   Final Result      US-ABDOMEN COMPLETE SURVEY   Final Result      1.  Echogenic liver suggesting fatty infiltration or fibrosis.   2.  Cholelithiasis without evidence for acute cholecystitis or biliary obstruction.           Assessment/Plan  * Rhabdomyolysis due to statin therapy- (present on admission)   Assessment & Plan    -cannot have statins anymore  -might be a PSCK9 inhibitor candidate?  She also has associated transaminitis.  -CPK finally peaked and now down-trending.  -Q12 hour CPK  -CXR personally reviewed by me shows mild blunting of the R CP angle and diffuse moderate pulmonary edema, no other findings noted  -continue IVF's at 75 cc/hour and consider stopping tomorrow     Rheumatoid arthritis involving multiple sites (HCC)- (present on admission)   Assessment & Plan    -appears to be possibly be a new diagnosis  -more prominent swelling over various joints and associated pain  -Anti-CCP very elevated, ESR/CRP elevated  -F/U all rheum labs  -no steroids until IVF's stopped, will need rheum set up and possible DMARD started with MTX     Peripheral edema   Assessment & Plan    -presumed 2/2 IVF's for rhabdo  -check Aldolase, SHELIA, anti-CCP, RF, and BNP  -check ECHO and CXR Pa lateral  -no diuretics at this time     Elevated troponin- (present on admission)   Assessment & Plan    Troponin is now 0.17, but no chest pain, no tele issues, no acute 12-lead EKG changes  -likely from rhabdo  -down-trended, no chest pain, stop trending  -monitor closely     Transaminitis- (present on admission)   Assessment & Plan    -no lab changes, remains without belly pain  -ALT is elevated which might not be due to rhabdo  -trend closely  -HEP panel Is negative  -check AB us, which shows fatty  liver     Hyponatremia- (present on admission)   Assessment & Plan    Sodium is mildly low and continues to fluctuate  IV fluids will be given with sodium and will hold on her hydrochlorothiazide  -check daily NA+     History of MI (myocardial infarction)- (present on admission)   Assessment & Plan    History of coronary stent in 2009     Overweight with body mass index (BMI) 25.0-29.9- (present on admission)   Assessment & Plan    BMI of 29     Essential hypertension- (present on admission)   Assessment & Plan    Outpatient bisoprolol and hydrochlorothiazide have been held as her blood pressure somewhat initially low, but improving slowly  -will restart bystolic with coreg and increase dose further today given persistently hypertensive, on formulary at lower dose          VTE prophylaxis: lovenox

## 2019-01-20 NOTE — PROGRESS NOTES
Page placed to MD (Sarmad Varela) to notify of patient with several episodes of bradycardia and one episode of a pause. No further orders at this time (patient is asymptomatic), will continue to monitor

## 2019-01-20 NOTE — DISCHARGE PLANNING
Anticipated Discharge Disposition: TBD, home    Action: LSW spoke with patient at bedside. Patient is independent with ADLs. Patient's son Rashid Lafleur ([phone]) or  Shaq can provide transportation home. Patient helps care for her  at home, son is currently helping. Patient receives SS and works. Patient reported no problems with attending medical appointment or obtaining Rx. No needs at this time.     Barriers to Discharge: none    Plan: LSW to follow for d/c needs.       Care Transition Team Assessment    Information Source  Orientation : Oriented x 4  Information Given By: Patient  Who is responsible for making decisions for patient? : Patient    Readmission Evaluation  Is this a readmission?: Yes - unplanned readmission    Elopement Risk  Legal Hold: No  Ambulatory or Self Mobile in Wheelchair: Yes  Disoriented: No  Psychiatric Symptoms: None  History of Wandering: No  Elopement this Admit: No  Vocalizing Wanting to Leave: No  Displays Behaviors, Body Language Wanting to Leave: No-Not at Risk for Elopement  Elopement Risk: Not at Risk for Elopement    Interdisciplinary Discharge Planning  Patient or legal guardian wants to designate a caregiver (see row info): No    Discharge Preparedness  What is your plan after discharge?: Uncertain - pending medical team collaboration  What are your discharge supports?: Child, Spouse  Prior Functional Level: Independent with Activities of Daily Living    Functional Assesment  Prior Functional Level: Independent with Activities of Daily Living    Finances  Financial Barriers to Discharge: No  Prescription Coverage: Yes              Advance Directive  Advance Directive?: None         Psychological Assessment  History of Substance Abuse: None  History of Psychiatric Problems: No  Non-compliant with Treatment: No  Newly Diagnosed Illness: No    Discharge Risks or Barriers  Discharge risks or barriers?: No    Anticipated Discharge Information  Anticipated  discharge disposition: Discharge needs currently unknown, Home  Discharge Address:  ([address] [place] [phone])  Discharge Contact Phone Number:  ([phone])

## 2019-01-20 NOTE — CARE PLAN
Problem: Communication  Goal: The ability to communicate needs accurately and effectively will improve  Outcome: PROGRESSING AS EXPECTED  Pt is able to communicate needs appropriately. Call light within reach.

## 2019-01-20 NOTE — PROGRESS NOTES
@ 2240 MD Vincent paged for an increasing critical lab value CPK 4740. Telephone with read back order to D/C NS 20 mEq K @83/hr. New order to run 0.9% NS @150mL/hr. MD Vincent notified of hands and feet having trace edema, EF 65%. Will continue to monitor for s/s of fluid overload.

## 2019-01-21 LAB
ALBUMIN SERPL BCP-MCNC: 2.4 G/DL (ref 3.2–4.9)
ALBUMIN/GLOB SERPL: 0.8 G/DL
ALP SERPL-CCNC: 97 U/L (ref 30–99)
ALT SERPL-CCNC: 163 U/L (ref 2–50)
ANION GAP SERPL CALC-SCNC: 8 MMOL/L (ref 0–11.9)
AST SERPL-CCNC: 158 U/L (ref 12–45)
BASOPHILS # BLD AUTO: 0.5 % (ref 0–1.8)
BASOPHILS # BLD: 0.05 K/UL (ref 0–0.12)
BILIRUB SERPL-MCNC: 0.3 MG/DL (ref 0.1–1.5)
BUN SERPL-MCNC: 9 MG/DL (ref 8–22)
CALCIUM SERPL-MCNC: 8.1 MG/DL (ref 8.5–10.5)
CHLORIDE SERPL-SCNC: 102 MMOL/L (ref 96–112)
CK SERPL-CCNC: 3152 U/L (ref 0–154)
CK SERPL-CCNC: 3369 U/L (ref 0–154)
CK SERPL-CCNC: 3659 U/L (ref 0–154)
CO2 SERPL-SCNC: 19 MMOL/L (ref 20–33)
CREAT SERPL-MCNC: 0.56 MG/DL (ref 0.5–1.4)
EOSINOPHIL # BLD AUTO: 0.46 K/UL (ref 0–0.51)
EOSINOPHIL NFR BLD: 4.1 % (ref 0–6.9)
ERYTHROCYTE [DISTWIDTH] IN BLOOD BY AUTOMATED COUNT: 41.7 FL (ref 35.9–50)
GLOBULIN SER CALC-MCNC: 3.1 G/DL (ref 1.9–3.5)
GLUCOSE SERPL-MCNC: 94 MG/DL (ref 65–99)
HCT VFR BLD AUTO: 32.8 % (ref 37–47)
HGB BLD-MCNC: 10.7 G/DL (ref 12–16)
IMM GRANULOCYTES # BLD AUTO: 0.14 K/UL (ref 0–0.11)
IMM GRANULOCYTES NFR BLD AUTO: 1.3 % (ref 0–0.9)
LYMPHOCYTES # BLD AUTO: 1.33 K/UL (ref 1–4.8)
LYMPHOCYTES NFR BLD: 12 % (ref 22–41)
MAGNESIUM SERPL-MCNC: 1.6 MG/DL (ref 1.5–2.5)
MCH RBC QN AUTO: 27.1 PG (ref 27–33)
MCHC RBC AUTO-ENTMCNC: 32.6 G/DL (ref 33.6–35)
MCV RBC AUTO: 83 FL (ref 81.4–97.8)
MONOCYTES # BLD AUTO: 0.84 K/UL (ref 0–0.85)
MONOCYTES NFR BLD AUTO: 7.6 % (ref 0–13.4)
NEUTROPHILS # BLD AUTO: 8.29 K/UL (ref 2–7.15)
NEUTROPHILS NFR BLD: 74.5 % (ref 44–72)
NRBC # BLD AUTO: 0 K/UL
NRBC BLD-RTO: 0 /100 WBC
PLATELET # BLD AUTO: 329 K/UL (ref 164–446)
PMV BLD AUTO: 8.7 FL (ref 9–12.9)
POTASSIUM SERPL-SCNC: 3.7 MMOL/L (ref 3.6–5.5)
PROT SERPL-MCNC: 5.5 G/DL (ref 6–8.2)
RBC # BLD AUTO: 3.95 M/UL (ref 4.2–5.4)
SODIUM SERPL-SCNC: 129 MMOL/L (ref 135–145)
WBC # BLD AUTO: 11.1 K/UL (ref 4.8–10.8)

## 2019-01-21 PROCEDURE — 770020 HCHG ROOM/CARE - TELE (206)

## 2019-01-21 PROCEDURE — 700111 HCHG RX REV CODE 636 W/ 250 OVERRIDE (IP): Performed by: INTERNAL MEDICINE

## 2019-01-21 PROCEDURE — 700105 HCHG RX REV CODE 258: Performed by: INTERNAL MEDICINE

## 2019-01-21 PROCEDURE — 700111 HCHG RX REV CODE 636 W/ 250 OVERRIDE (IP): Performed by: HOSPITALIST

## 2019-01-21 PROCEDURE — 82550 ASSAY OF CK (CPK): CPT | Mod: 91

## 2019-01-21 PROCEDURE — 80053 COMPREHEN METABOLIC PANEL: CPT

## 2019-01-21 PROCEDURE — 700102 HCHG RX REV CODE 250 W/ 637 OVERRIDE(OP): Performed by: INTERNAL MEDICINE

## 2019-01-21 PROCEDURE — A9270 NON-COVERED ITEM OR SERVICE: HCPCS | Performed by: INTERNAL MEDICINE

## 2019-01-21 PROCEDURE — 83735 ASSAY OF MAGNESIUM: CPT

## 2019-01-21 PROCEDURE — 85025 COMPLETE CBC W/AUTO DIFF WBC: CPT

## 2019-01-21 PROCEDURE — 99232 SBSQ HOSP IP/OBS MODERATE 35: CPT | Performed by: INTERNAL MEDICINE

## 2019-01-21 PROCEDURE — 36415 COLL VENOUS BLD VENIPUNCTURE: CPT

## 2019-01-21 PROCEDURE — 700102 HCHG RX REV CODE 250 W/ 637 OVERRIDE(OP): Performed by: HOSPITALIST

## 2019-01-21 PROCEDURE — A9270 NON-COVERED ITEM OR SERVICE: HCPCS | Performed by: HOSPITALIST

## 2019-01-21 RX ORDER — HYDRALAZINE HYDROCHLORIDE 25 MG/1
25 TABLET, FILM COATED ORAL EVERY 8 HOURS
Status: DISCONTINUED | OUTPATIENT
Start: 2019-01-21 | End: 2019-01-22 | Stop reason: HOSPADM

## 2019-01-21 RX ORDER — PREDNISONE 20 MG/1
20 TABLET ORAL DAILY
Status: DISCONTINUED | OUTPATIENT
Start: 2019-01-21 | End: 2019-01-22

## 2019-01-21 RX ORDER — AMLODIPINE BESYLATE 10 MG/1
10 TABLET ORAL
Status: DISCONTINUED | OUTPATIENT
Start: 2019-01-21 | End: 2019-01-21

## 2019-01-21 RX ORDER — SODIUM CHLORIDE 9 MG/ML
INJECTION, SOLUTION INTRAVENOUS CONTINUOUS
Status: DISCONTINUED | OUTPATIENT
Start: 2019-01-21 | End: 2019-01-22 | Stop reason: HOSPADM

## 2019-01-21 RX ADMIN — SODIUM CHLORIDE: 9 INJECTION, SOLUTION INTRAVENOUS at 07:21

## 2019-01-21 RX ADMIN — ASPIRIN 325 MG: 325 TABLET ORAL at 17:30

## 2019-01-21 RX ADMIN — ENOXAPARIN SODIUM 40 MG: 100 INJECTION SUBCUTANEOUS at 05:10

## 2019-01-21 RX ADMIN — CARVEDILOL 12.5 MG: 12.5 TABLET, FILM COATED ORAL at 05:10

## 2019-01-21 RX ADMIN — IBUPROFEN 400 MG: 800 TABLET, FILM COATED ORAL at 08:28

## 2019-01-21 RX ADMIN — SODIUM CHLORIDE: 9 INJECTION, SOLUTION INTRAVENOUS at 14:31

## 2019-01-21 RX ADMIN — ACETAMINOPHEN 650 MG: 325 TABLET, FILM COATED ORAL at 08:28

## 2019-01-21 RX ADMIN — HYDRALAZINE HYDROCHLORIDE 25 MG: 25 TABLET, FILM COATED ORAL at 21:05

## 2019-01-21 RX ADMIN — PREDNISONE 20 MG: 20 TABLET ORAL at 10:54

## 2019-01-21 RX ADMIN — HYDRALAZINE HYDROCHLORIDE 25 MG: 25 TABLET, FILM COATED ORAL at 10:54

## 2019-01-21 ASSESSMENT — ENCOUNTER SYMPTOMS
VOMITING: 0
CHILLS: 0
MYALGIAS: 1
DOUBLE VISION: 0
NAUSEA: 0
DIZZINESS: 0
FEVER: 0
HEADACHES: 0
SHORTNESS OF BREATH: 0
WEAKNESS: 1

## 2019-01-21 ASSESSMENT — PAIN SCALES - GENERAL
PAINLEVEL_OUTOF10: 2
PAINLEVEL_OUTOF10: 4
PAINLEVEL_OUTOF10: 2
PAINLEVEL_OUTOF10: 0
PAINLEVEL_OUTOF10: 2
PAINLEVEL_OUTOF10: 1

## 2019-01-21 ASSESSMENT — PATIENT HEALTH QUESTIONNAIRE - PHQ9
1. LITTLE INTEREST OR PLEASURE IN DOING THINGS: NOT AT ALL
2. FEELING DOWN, DEPRESSED, IRRITABLE, OR HOPELESS: NOT AT ALL
SUM OF ALL RESPONSES TO PHQ9 QUESTIONS 1 AND 2: 0

## 2019-01-21 NOTE — PROGRESS NOTES
Alonso from Lab called with critical result of CK 3369 at 1345. Critical lab result read back to Alonso.   Dr. Varela notified of critical lab result at 1354.  Critical lab result read back by Dr. Varela.

## 2019-01-21 NOTE — CARE PLAN
Problem: Safety  Goal: Will remain free from injury  Outcome: PROGRESSING AS EXPECTED  Pt up independently in room. Steady on feet.     Problem: Pain Management  Goal: Pain level will decrease to patient's comfort goal  Outcome: PROGRESSING AS EXPECTED  Pts pain controlled with PRN tylenol and ibuprofen.

## 2019-01-21 NOTE — PROGRESS NOTES
Hospital Medicine Daily Progress Note    Date of Service  1/21/2019    Chief Complaint  68 y.o. female admitted 1/16/2019 with rhabdomyolysis presumed 2/2 statin use    Hospital Course  See below    Interval Problem Update  Rhabdo-muscle weakness remains quite paramount and her joint pain is even worse today, impacting her ability to place her cell phone  in the wall.  CPK continues to down-trend and she is urinating well still.     Joint pain-as noted above, even worse today, awaiting more auto-immune labs to come back, start empiric steroids today.     Elevated LFTS-no change noted again today.   TELE showed wide ranging HR last night again from . Patient was not symptomatic during any of these events.     Consultants/Specialty  none    Code Status  fc/fc    Disposition  TELE then home    Review of Systems  Review of Systems   Constitutional: Negative for chills and fever.   HENT: Negative for hearing loss.    Eyes: Negative for double vision.   Respiratory: Negative for shortness of breath.    Cardiovascular: Positive for leg swelling (Remains quite severe in R hand and LLE). Negative for chest pain.   Gastrointestinal: Negative for nausea and vomiting.   Genitourinary: Negative for dysuria, frequency and urgency.   Musculoskeletal: Positive for joint pain (quite severe) and myalgias (feels it is slightly worse today).   Skin: Negative for itching.   Neurological: Positive for weakness (worse today). Negative for dizziness and headaches.   All other systems reviewed and are negative.       Physical Exam  Temp:  [37.1 °C (98.7 °F)-37.6 °C (99.7 °F)] 37.1 °C (98.7 °F)  Pulse:  [] 88  Resp:  [16-20] 16  BP: (100-179)/(43-87) 115/61  SpO2:  [90 %-95 %] 93 %    Physical Exam   Constitutional: She is oriented to person, place, and time. She appears well-developed and well-nourished.   Appears comfortable today   HENT:   Head: Normocephalic and atraumatic.   Mouth/Throat: No oropharyngeal exudate.    Eyes: Pupils are equal, round, and reactive to light. Right eye exhibits no discharge. Left eye exhibits no discharge.   Neck: Normal range of motion. Neck supple.   Cardiovascular: Normal rate and regular rhythm.    No murmur heard.  Pulmonary/Chest: Effort normal and breath sounds normal. No stridor. No respiratory distress. She has no wheezes.   Speaking in full sentences  Good aeration throughout  No interval clinical changes noted today   Abdominal: Soft. Bowel sounds are normal. There is no tenderness.   Musculoskeletal: Normal range of motion. She exhibits edema (hand to the wrist and pedal edema B/L, diffuse, no overlying erythema or warmth, more concentrated over the MCP of the R hand) and tenderness (throughout and diffuse, a bit worse today).   Neurological: She is alert and oriented to person, place, and time. Coordination normal.   Skin: Skin is warm and dry. No rash noted.   No open skin lesions   Psychiatric: She has a normal mood and affect.   Nursing note and vitals reviewed.      Fluids    Intake/Output Summary (Last 24 hours) at 01/21/19 1532  Last data filed at 01/21/19 1338   Gross per 24 hour   Intake              920 ml   Output                0 ml   Net              920 ml       Laboratory  Recent Labs      01/20/19   0914  01/21/19   0053   WBC  10.8  11.1*   RBC  3.69*  3.95*   HEMOGLOBIN  10.4*  10.7*   HEMATOCRIT  30.7*  32.8*   MCV  83.2  83.0   MCH  28.2  27.1   MCHC  33.9  32.6*   RDW  42.8  41.7   PLATELETCT  326  329   MPV  8.9*  8.7*     Recent Labs      01/19/19   0108  01/20/19   0914  01/21/19   0053   SODIUM  132*  131*  129*   POTASSIUM  4.2  3.8  3.7   CHLORIDE  106  104  102   CO2  18*  19*  19*   GLUCOSE  95  162*  94   BUN  7*  7*  9   CREATININE  0.60  0.57  0.56   CALCIUM  8.0*  7.6*  8.1*         Recent Labs      01/20/19   0914   BNPBTYPENAT  80           Imaging  DX-CHEST-2 VIEWS   Final Result      Interstitial opacities may represent dependent edema or pneumonitis.       Possible 4 mm nodular opacity in the right upper lobe. Short interval follow-up is recommended.      DX-HAND 2- RIGHT   Final Result      1.  No right hand fracture or dislocation.      2.  Mild diffuse degenerative changes as described.      EC-ECHOCARDIOGRAM COMPLETE W/O CONT   Final Result      US-ABDOMEN COMPLETE SURVEY   Final Result      1.  Echogenic liver suggesting fatty infiltration or fibrosis.   2.  Cholelithiasis without evidence for acute cholecystitis or biliary obstruction.           Assessment/Plan  * Rhabdomyolysis due to statin therapy- (present on admission)   Assessment & Plan    -cannot have statins anymore  -might be a PSCK9 inhibitor candidate?  She also has associated transaminitis.  -CPK continues to down-trend  -Q12 hour CPK  -continue IVF's at 75 cc/hour, will almost certainly stop tomorrow     Rheumatoid arthritis involving multiple sites (HCC)- (present on admission)   Assessment & Plan    -appears to be possibly be a new diagnosis, joint pain is worse today  -more prominent swelling over various joints and associated pain  -Anti-CCP very elevated, ESR/CRP elevated  -F/U all rheum labs  -start prednisone 20 mg daily  -will need rheum set up and possible DMARD started with MTX (less likely given possible liver issues)     Peripheral edema   Assessment & Plan    -presumed 2/2 IVF's for rhabdo  -check Aldolase, SHELIA, anti-CCP, RF, and BNP  -check ECHO and CXR Pa lateral-both normal  -no diuretics at this time     Elevated troponin- (present on admission)   Assessment & Plan    Troponin is now 0.17, but no chest pain, no tele issues, no acute 12-lead EKG changes  -likely from rhabdo  -down-trended, no chest pain, stop trending  -monitor closely     Transaminitis- (present on admission)   Assessment & Plan    -labs continue to remain with same level of elevation, no abdominal pain again today  -ALT is elevated which might not be due to rhabdo  -trend closely  -HEP panel Is  negative  -check AB us, which shows fatty liver     Hyponatremia- (present on admission)   Assessment & Plan    Sodium is mildly low and continues to fluctuate  IV fluids will be given with sodium and will hold on her hydrochlorothiazide  -check daily NA+     History of MI (myocardial infarction)- (present on admission)   Assessment & Plan    History of coronary stent in 2009     Overweight with body mass index (BMI) 25.0-29.9- (present on admission)   Assessment & Plan    BMI of 29     Essential hypertension- (present on admission)   Assessment & Plan    Outpatient bisoprolol and hydrochlorothiazide have been held as her blood pressure somewhat initially low, but improving slowly  -stop coreg given bradycardia to 20 in the AM  -could be an element of SSS  -needs outpatient holter monitoring  -start hydralazine 25 mg TID and adjust PRN          VTE prophylaxis: lovenox

## 2019-01-21 NOTE — PROGRESS NOTES
MD Vincent notified via telephone that patient had a critical lab value--CPK Total of 4,053.  MD Vincent did not have any new orders.     Mom calls and indicates child was seen in the P.O. Yesterday and Rx for Z-Michael and Flonase ordered and was sent to [facility]Pharmacy. Mom requesting prescriptions be sent to [address] [place] [address]. Contacted Dr. Steven Alonso and order received.   Prescriptions called to [facility] pharmacy and notified Mom.--P.L./R.N.

## 2019-01-21 NOTE — PROGRESS NOTES
Monitory Summary    SR 83-95, SB down to a low of 36 while sleeping  .20/.08/.36    12 hour chart check

## 2019-01-21 NOTE — PROGRESS NOTES
Dr. Varela notified that pts HR dropped to 29, SB, for 9 seconds overnight, while pt asleep. Coreg ludivina'anna.

## 2019-01-21 NOTE — PROGRESS NOTES
Received report from NOC RN and assumed care of pt at 0700. Pt AOx4, c/o generalized aching pain, motrin and tylenol given. Plan of care discussed with patient. Bed in low and locked position. Call light and personal belongings in reach.

## 2019-01-21 NOTE — CARE PLAN
Problem: Safety  Goal: Will remain free from falls  Outcome: PROGRESSING AS EXPECTED  Patient resting in bed with call light in reach and room free of clutter.      Problem: Pain Management  Goal: Pain level will decrease to patient's comfort goal  Outcome: PROGRESSING AS EXPECTED  Patient advised to request prn pain medication as needed for pain management.

## 2019-01-22 ENCOUNTER — PATIENT OUTREACH (OUTPATIENT)
Dept: HEALTH INFORMATION MANAGEMENT | Facility: OTHER | Age: 69
End: 2019-01-22

## 2019-01-22 VITALS
TEMPERATURE: 98.2 F | SYSTOLIC BLOOD PRESSURE: 147 MMHG | WEIGHT: 194 LBS | DIASTOLIC BLOOD PRESSURE: 87 MMHG | BODY MASS INDEX: 33.12 KG/M2 | RESPIRATION RATE: 17 BRPM | OXYGEN SATURATION: 95 % | HEART RATE: 91 BPM | HEIGHT: 64 IN

## 2019-01-22 LAB
ALBUMIN SERPL BCP-MCNC: 2.5 G/DL (ref 3.2–4.9)
ALBUMIN/GLOB SERPL: 0.8 G/DL
ALDOLASE SERPL-CCNC: 42.5 U/L (ref 1.5–8.1)
ALP SERPL-CCNC: 110 U/L (ref 30–99)
ALT SERPL-CCNC: 171 U/L (ref 2–50)
ANION GAP SERPL CALC-SCNC: 6 MMOL/L (ref 0–11.9)
AST SERPL-CCNC: 127 U/L (ref 12–45)
BILIRUB SERPL-MCNC: 0.2 MG/DL (ref 0.1–1.5)
BUN SERPL-MCNC: 13 MG/DL (ref 8–22)
CALCIUM SERPL-MCNC: 8.1 MG/DL (ref 8.5–10.5)
CCP IGG SERPL-ACNC: 111 UNITS (ref 0–19)
CHLORIDE SERPL-SCNC: 105 MMOL/L (ref 96–112)
CK SERPL-CCNC: 2547 U/L (ref 0–154)
CO2 SERPL-SCNC: 21 MMOL/L (ref 20–33)
CREAT SERPL-MCNC: 0.65 MG/DL (ref 0.5–1.4)
GLOBULIN SER CALC-MCNC: 3.1 G/DL (ref 1.9–3.5)
GLUCOSE SERPL-MCNC: 129 MG/DL (ref 65–99)
NUCLEAR IGG SER QL IA: DETECTED
POTASSIUM SERPL-SCNC: 4.1 MMOL/L (ref 3.6–5.5)
PROT SERPL-MCNC: 5.6 G/DL (ref 6–8.2)
RHEUMATOID FACT SER NEPH-ACNC: 14 IU/ML (ref 0–14)
SODIUM SERPL-SCNC: 132 MMOL/L (ref 135–145)

## 2019-01-22 PROCEDURE — 700102 HCHG RX REV CODE 250 W/ 637 OVERRIDE(OP): Performed by: INTERNAL MEDICINE

## 2019-01-22 PROCEDURE — 80053 COMPREHEN METABOLIC PANEL: CPT

## 2019-01-22 PROCEDURE — 97165 OT EVAL LOW COMPLEX 30 MIN: CPT

## 2019-01-22 PROCEDURE — 82550 ASSAY OF CK (CPK): CPT

## 2019-01-22 PROCEDURE — 99239 HOSP IP/OBS DSCHRG MGMT >30: CPT | Performed by: INTERNAL MEDICINE

## 2019-01-22 PROCEDURE — 97162 PT EVAL MOD COMPLEX 30 MIN: CPT

## 2019-01-22 PROCEDURE — A9270 NON-COVERED ITEM OR SERVICE: HCPCS | Performed by: INTERNAL MEDICINE

## 2019-01-22 PROCEDURE — 700111 HCHG RX REV CODE 636 W/ 250 OVERRIDE (IP): Performed by: INTERNAL MEDICINE

## 2019-01-22 PROCEDURE — 700105 HCHG RX REV CODE 258: Performed by: INTERNAL MEDICINE

## 2019-01-22 PROCEDURE — 36415 COLL VENOUS BLD VENIPUNCTURE: CPT

## 2019-01-22 PROCEDURE — 700111 HCHG RX REV CODE 636 W/ 250 OVERRIDE (IP): Performed by: HOSPITALIST

## 2019-01-22 RX ORDER — PREDNISONE 20 MG/1
40 TABLET ORAL DAILY
Status: DISCONTINUED | OUTPATIENT
Start: 2019-01-23 | End: 2019-01-22 | Stop reason: HOSPADM

## 2019-01-22 RX ORDER — AMLODIPINE BESYLATE 5 MG/1
10 TABLET ORAL DAILY
Qty: 30 TAB | Refills: 0 | Status: SHIPPED | OUTPATIENT
Start: 2019-01-22 | End: 2019-02-05 | Stop reason: SDUPTHER

## 2019-01-22 RX ORDER — HYDROXYCHLOROQUINE SULFATE 200 MG/1
200 TABLET, FILM COATED ORAL DAILY
Qty: 14 TAB | Refills: 0 | Status: SHIPPED | OUTPATIENT
Start: 2019-01-23 | End: 2019-02-05 | Stop reason: SDUPTHER

## 2019-01-22 RX ORDER — PREDNISONE 20 MG/1
20 TABLET ORAL DAILY
Qty: 14 TAB | Refills: 0 | Status: SHIPPED | OUTPATIENT
Start: 2019-01-22 | End: 2019-02-05 | Stop reason: SDUPTHER

## 2019-01-22 RX ORDER — HYDROXYCHLOROQUINE SULFATE 200 MG/1
200 TABLET, FILM COATED ORAL DAILY
Status: DISCONTINUED | OUTPATIENT
Start: 2019-01-22 | End: 2019-01-22 | Stop reason: HOSPADM

## 2019-01-22 RX ADMIN — HYDRALAZINE HYDROCHLORIDE 25 MG: 25 TABLET, FILM COATED ORAL at 05:04

## 2019-01-22 RX ADMIN — HYDRALAZINE HYDROCHLORIDE 25 MG: 25 TABLET, FILM COATED ORAL at 13:37

## 2019-01-22 RX ADMIN — HYDROXYCHLOROQUINE SULFATE 200 MG: 200 TABLET, FILM COATED ORAL at 08:55

## 2019-01-22 RX ADMIN — SODIUM CHLORIDE: 9 INJECTION, SOLUTION INTRAVENOUS at 05:05

## 2019-01-22 RX ADMIN — PREDNISONE 20 MG: 20 TABLET ORAL at 05:04

## 2019-01-22 RX ADMIN — ENOXAPARIN SODIUM 40 MG: 100 INJECTION SUBCUTANEOUS at 05:04

## 2019-01-22 ASSESSMENT — COGNITIVE AND FUNCTIONAL STATUS - GENERAL
CLIMB 3 TO 5 STEPS WITH RAILING: A LITTLE
SUGGESTED CMS G CODE MODIFIER DAILY ACTIVITY: CH
SUGGESTED CMS G CODE MODIFIER MOBILITY: CI
DAILY ACTIVITIY SCORE: 24
MOBILITY SCORE: 23

## 2019-01-22 ASSESSMENT — PAIN SCALES - GENERAL
PAINLEVEL_OUTOF10: 0
PAINLEVEL_OUTOF10: 0

## 2019-01-22 ASSESSMENT — ACTIVITIES OF DAILY LIVING (ADL): TOILETING: INDEPENDENT

## 2019-01-22 ASSESSMENT — GAIT ASSESSMENTS
DISTANCE (FEET): 150
GAIT LEVEL OF ASSIST: STAND BY ASSIST
DEVIATION: BRADYKINETIC

## 2019-01-22 NOTE — THERAPY
"Occupational Therapy Evaluation completed.   Functional Status: Up in chair, SBA w/LB dressing, spv w/standing at sink for grooming, walking in room and hallway no AD. Had c/o SOB/fatigue w/activity, however VSS. Educated on energy conservation and gradual return to activity. Anticipate pt will continue to progress in this setting and reports assist as needed upon d/c home   Plan of Care: No further acute OT needs   Discharge Recommendations:  Equipment: Will Continue to Assess for Equipment Needs. Anticipate that the patient will have no further occupational therapy needs after discharge from the hospital.         See \"Rehab Therapy-Acute\" Patient Summary Report for complete documentation.      68 yr old female admitted for joint pain and weakness, dx w/rhabdomyolysis 2/2 statin use. Pt has improved in this setting and at this time has demonstrated ability to complete ADL's and txfs. Pt does have some on going deconditioning, encouraged pt to gradually increased activity. Anticipate continued progress no further OT needs  "

## 2019-01-22 NOTE — PROGRESS NOTES
Received report from NOC RN and assumed care of pt at 0700. Pt AOx4, denies pain. Would like to discuss with MD regarding fluid increase and swelling. Plan of care discussed with patient. Bed in low and locked position. Call light and personal belongings in reach.

## 2019-01-22 NOTE — DISCHARGE INSTRUCTIONS
Please get referral to rheumatology from primary care. F/u 1-2 weeks.     Discharge Instructions    Discharged to home by car with relative. Discharged via wheelchair, hospital escort: Yes.  Special equipment needed: Not Applicable    Be sure to schedule a follow-up appointment with your primary care doctor or any specialists as instructed.     Discharge Plan:   Diet Plan: Discussed  Activity Level: Discussed  Confirmed Follow up Appointment: Appointment Scheduled  Confirmed Symptoms Management: Discussed  Medication Reconciliation Updated: Yes  Pneumococcal Vaccine Administered/Refused: Not given - Patient refused pneumococcal vaccine  Influenza Vaccine Indication: Patient Refuses    I understand that a diet low in cholesterol, fat, and sodium is recommended for good health. Unless I have been given specific instructions below for another diet, I accept this instruction as my diet prescription.   Other diet: Regular    Special Instructions: None    · Is patient discharged on Warfarin / Coumadin?   No       Rhabdomyolysis  Rhabdomyolysis is a condition that happens when muscle cells break down and release substances into the blood that can damage the kidneys. This condition happens because of damage to the muscles that move bones (skeletal muscle). When the skeletal muscles are damaged, substances inside the muscle cells go into the blood. One of these substances is a protein called myoglobin.  Large amounts of myoglobin can cause kidney damage or kidney failure. Other substances that are released by muscle cells may upset the balance of the minerals (electrolytes) in your blood. This imbalance causes your blood to have too much acid (acidosis).  What are the causes?  This condition is caused by muscle damage. Muscle damage often happens because of:  · Using your muscles too much.  · An injury that crushes or squeezes a muscle too tightly.  · Using illegal drugs, mainly cocaine.  · Alcohol abuse.  Other possible  causes include:  · Prescription medicines, such as those that:  ¨ Lower cholesterol (statins).  ¨ Treat ADHD (attention deficit hyperactivity disorder) or help with weight loss (amphetamines).  ¨ Treat pain (opiates).  · Infections.  · Muscle diseases that are passed down from parent to child (inherited).  · High fever.  · Heatstroke.  · Not having enough fluids in your body (dehydration).  · Seizures.  · Surgery.  What increases the risk?  This condition is more likely to develop in people who:  · Have a family history of muscle disease.  · Take part in extreme sports, such as running in marathons.  · Have diabetes.  · Are older.  · Abuse drugs or alcohol.  What are the signs or symptoms?  Symptoms of this condition vary. Some people have very few symptoms, and other people have many symptoms. The most common symptoms include:  · Muscle pain and swelling.  · Weak muscles.  · Dark urine.  · Feeling weak and tired.  Other symptoms include:  · Nausea and vomiting.  · Fever.  · Pain in the abdomen.  · Pain in the joints.  Symptoms of complications from this condition include:  · Heart rhythm that is not normal (arrhythmia).  · Seizures.  · Not urinating enough because of kidney failure.  · Very low blood pressure (shock). Signs of shock include dizziness, blurry vision, and clammy skin.  · Bleeding that is hard to stop or control.  How is this diagnosed?  This condition is diagnosed based on your medical history, your symptoms, and a physical exam. Tests may also be done, including:  · Blood tests.  · Urine tests to check for myoglobin.  You may also have other tests to check for causes of muscle damage and to check for complications.  How is this treated?  Treatment for this condition helps to:  · Make sure you have enough fluids in your body.  · Lower the acid levels in your blood to reverse acidosis.  · Protect your kidneys.  Treatment may include:  · Fluids and medicines given through an IV tube that is inserted  into one of your veins.  · Medicines to lower acidosis or to bring back the balance of the minerals in your body.  · Hemodialysis. This treatment uses an artificial kidney machine to filter your blood while you recover. You may have this if other treatments are not helping.  Follow these instructions at home:  · Take over-the-counter and prescription medicines only as told by your health care provider.  · Rest at home until your health care provider says that you can return to your normal activities.  · Drink enough fluid to keep your urine clear or pale yellow.  · Do not do activities that take a lot of effort (are strenuous). Ask your health care provider what level of exercise is safe for you.  · Do not abuse drugs or alcohol. If you are having problems with drug or alcohol use, ask your health care provider for help.  · Keep all follow-up visits as told by your health care provider. This is important.  Contact a health care provider if:  · You start having symptoms of this condition after treatment.  Get help right away if:  · You have a seizure.  · You bleed easily or cannot control bleeding.  · You cannot urinate.  · You have chest pain.  · You have trouble breathing.  This information is not intended to replace advice given to you by your health care provider. Make sure you discuss any questions you have with your health care provider.  Document Released: 11/30/2005 Document Revised: 09/29/2017 Document Reviewed: 09/29/2017  [employer] Interactive Patient Education © 2017 [employer]       Rheumatoid Arthritis  Introduction  Rheumatoid arthritis (RA) is a long-term (chronic) disease. RA causes inflammation in your joints. Your joints may feel painful, stiff, swollen, warm, or tender. RA may start slowly. Usually, it affects the small joints of the hands and feet. It can also affect other parts of the body, even the heart, eyes, or lungs. Symptoms of RA often come and go. Sometimes, symptoms get worse for a  while. These are called flares.  There is no cure for RA, but your doctor will work with you to find the best treatment option for you. This will depend on how the disease is changing in your body.  Follow these instructions at home:  · Take over-the-counter and prescription medicines only as told by your doctor. Your doctor may change (adjust) your medicines every 3 months.  · Start an exercise program as told by your doctor.  · Rest when you have a flare.  · Return to your normal activities as told by your doctor. Ask your doctor what activities are safe for you.  · Keep all follow-up visits as told by your doctor. This is important.  Contact a doctor if:  · You have a flare.  · You have a fever.  · You have problems (side effects) because of your medicines.  Get help right away if:  · You have chest pain.  · You have trouble breathing.  · You have a hot, painful joint all of a sudden, and it is worse than your usual joint aches.  This information is not intended to replace advice given to you by your health care provider. Make sure you discuss any questions you have with your health care provider.  Document Released: 03/11/2013 Document Revised: 05/25/2017 Document Reviewed: 09/29/2016  © 2017 [employer]        Depression / Suicide Risk    As you are discharged from this [facility], it is important to learn how to keep safe from harming yourself.    Recognize the warning signs:  · Abrupt changes in personality, positive or negative- including increase in energy   · Giving away possessions  · Change in eating patterns- significant weight changes-  positive or negative  · Change in sleeping patterns- unable to sleep or sleeping all the time   · Unwillingness or inability to communicate  · Depression  · Unusual sadness, discouragement and loneliness  · Talk of wanting to die  · Neglect of personal appearance   · Rebelliousness- reckless behavior  · Withdrawal from people/activities they love  · Confusion-  inability to concentrate     If you or a loved one observes any of these behaviors or has concerns about self-harm, here's what you can do:  · Talk about it- your feelings and reasons for harming yourself  · Remove any means that you might use to hurt yourself (examples: pills, rope, extension cords, firearm)  · Get professional help from the community (Mental Health, Substance Abuse, psychological counseling)  · Do not be alone:Call your Safe Contact- someone whom you trust who will be there for you.  · Call your local CRISIS HOTLINE 139-1967 or 803-403-7108  · Call your local Children's Mobile Crisis Response Team [place] (075) 576-0944 or www.[employer]  · Call the toll free National Suicide Prevention Hotlines   · National Suicide Prevention Lifeline 856-793-MBHZ (8338)  · National Hope Line Network 800-SUICIDE (331-6915)

## 2019-01-22 NOTE — DISCHARGE SUMMARY
Discharge Summary    CHIEF COMPLAINT ON ADMISSION  Chief Complaint   Patient presents with   • Sent by MD     sent by md for abnormal labs. has elevated CPK of 2306   • Weakness     generalized weakness x 2 weeks. also c/o joint pain. neuro intact. no facial droop/no slurred speech/ equal. AAOX4.       Reason for Admission  Sent by MD; Abnormal labs     Admission Date  1/16/2019    CODE STATUS  Full Code    HPI & HOSPITAL COURSE  This is a 68 y.o. female here with abnormal labs with CPK 2300 and generalized weakness for the past 2 weeks.  Please refer to H&P for details.  The patient take statin medication as an outpatient and her rhabdomyolysis is likely related to it.  The medication was held and the patient was started on IV fluid.  She has been monitor closely with daily blood tests on her creatinine level.  Her CPK continued to trend down slowly.  In addition she had multiple joint pain in her hands and lower extremity for the past several years.  Inflammatory arthropathy was suspected and some of the labs were ordered.  She was found to have anti-CCP positive, ESR and CRP were elevated, rheumatoid factor was normal.  The patient was started on low-dose prednisone 20 mg and her joint pain improved significantly.  The patient has no history of rheumatoid arthritis in the past but her mother has history of rheumatoid arthritis.  She was also started on hydroxychloroquine for her joint pain as well.  Outpatient rheumatology appointment was arranged for the patient upon discharge.  She was instructed to come back to ER if her symptoms get worse.  She also stated that she felt much better today and would like to be discharged home.  She was also found to have episode of bradycardia and her bblocker was held. Her BP was a little high in ER. Educated her to check BP 3 times a day and follow up with PCP. Discharged on amlodipine 10 mg qday.       Therefore, she is discharged in fair and stable condition to home  with close outpatient follow-up.    The patient met 2-midnight criteria for an inpatient stay at the time of discharge.    Discharge Date  1/22/19    FOLLOW UP ITEMS POST DISCHARGE      DISCHARGE DIAGNOSES  Principal Problem:    Rhabdomyolysis due to statin therapy POA: Yes  Active Problems:    Rheumatoid arthritis involving multiple sites (HCC) POA: Yes    Essential hypertension POA: Yes    Overweight with body mass index (BMI) 25.0-29.9 POA: Yes    History of MI (myocardial infarction) POA: Yes    Hyponatremia POA: Yes    Transaminitis POA: Yes    Elevated troponin POA: Yes    Peripheral edema POA: No  Resolved Problems:    * No resolved hospital problems. *      FOLLOW UP  Future Appointments  Date Time Provider Department Center   1/29/2019 10:15 AM Lon Berman M.D. [facility] None   6/13/2019 11:00 AM FUAD [facility]     rheumatology in 1-2 weeks    In 1 week        MEDICATIONS ON DISCHARGE     Medication List      START taking these medications      Instructions   hydroxychloroquine 200 MG Tabs  Start taking on:  1/23/2019  Commonly known as:  PLAQUENIL   Take 1 Tab by mouth every day.  Dose:  200 mg     predniSONE 20 MG Tabs  Commonly known as:  DELTASONE   Take 1 Tab by mouth every day.  Dose:  20 mg        CONTINUE taking these medications      Instructions   albuterol 108 (90 Base) MCG/ACT Aers inhalation aerosol   Doctor's comments:  With a spacer device  Inhale 2 Puffs by mouth every four hours as needed for Shortness of Breath.  Dose:  2 Puff     aspirin 325 MG Tabs  Commonly known as:  ASA   Take 325 mg by mouth every evening.  Dose:  325 mg     bisoprolol 10 MG tablet  Commonly known as:  ZEBETA   Take 10-20 mg by mouth See Admin Instructions. 10mg Monday, 20mg Tuesday, 10mg Wednesday, 20mg Thursday, 10mg Friday, 20mg Saturday, 10mg Sunday.  Dose:  10-20 mg        STOP taking these medications    hydroCHLOROthiazide 12.5 MG tablet  Commonly known as:  HYDRODIURIL      simvastatin 80 MG tablet  Commonly known as:  ZOCOR            Allergies  Allergies   Allergen Reactions   • Lisinopril Cough       DIET  Orders Placed This Encounter   Procedures   • Diet Order Regular     Standing Status:   Standing     Number of Occurrences:   1     Order Specific Question:   Diet:     Answer:   Regular [1]       ACTIVITY  As tolerated.  Weight bearing as tolerated    CONSULTATIONS      PROCEDURES      LABORATORY  Lab Results   Component Value Date    SODIUM 132 (L) 01/22/2019    POTASSIUM 4.1 01/22/2019    CHLORIDE 105 01/22/2019    CO2 21 01/22/2019    GLUCOSE 129 (H) 01/22/2019    BUN 13 01/22/2019    CREATININE 0.65 01/22/2019        Lab Results   Component Value Date    WBC 11.1 (H) 01/21/2019    HEMOGLOBIN 10.7 (L) 01/21/2019    HEMATOCRIT 32.8 (L) 01/21/2019    PLATELETCT 329 01/21/2019        Total time of the discharge process exceeds 35 minutes.

## 2019-01-22 NOTE — PROGRESS NOTES
D/c instructions reviewed in detail. Pt indicates understanding. All of pts needs met at this time. Pt escorted down by this RN and picked up by her .

## 2019-01-23 LAB — CH50 SERPL-ACNC: 111 CAE UNITS (ref 60–144)

## 2019-01-24 LAB
ANA INTERPRETIVE COMMENT Q5143: NORMAL
ANTINUCLEAR ANTIBODY (ANA), HEP-2, IGG Q5142: NORMAL
CYTOPLASMIC PATTERN TITER Q5148: ABNORMAL

## 2019-01-29 ENCOUNTER — OFFICE VISIT (OUTPATIENT)
Dept: CARDIOLOGY | Facility: MEDICAL CENTER | Age: 69
End: 2019-01-29
Payer: MEDICARE

## 2019-01-29 ENCOUNTER — TELEPHONE (OUTPATIENT)
Dept: CARDIOLOGY | Facility: MEDICAL CENTER | Age: 69
End: 2019-01-29

## 2019-01-29 VITALS
HEART RATE: 102 BPM | HEIGHT: 64 IN | OXYGEN SATURATION: 96 % | DIASTOLIC BLOOD PRESSURE: 60 MMHG | WEIGHT: 175 LBS | SYSTOLIC BLOOD PRESSURE: 140 MMHG | BODY MASS INDEX: 29.88 KG/M2

## 2019-01-29 DIAGNOSIS — T46.6X5A RHABDOMYOLYSIS DUE TO STATIN THERAPY: ICD-10-CM

## 2019-01-29 DIAGNOSIS — E78.2 MIXED HYPERLIPIDEMIA: ICD-10-CM

## 2019-01-29 DIAGNOSIS — M62.82 RHABDOMYOLYSIS DUE TO STATIN THERAPY: ICD-10-CM

## 2019-01-29 DIAGNOSIS — R60.9 PERIPHERAL EDEMA: ICD-10-CM

## 2019-01-29 DIAGNOSIS — I10 ESSENTIAL HYPERTENSION: ICD-10-CM

## 2019-01-29 DIAGNOSIS — I25.10 CORONARY ARTERY DISEASE INVOLVING NATIVE CORONARY ARTERY OF NATIVE HEART WITHOUT ANGINA PECTORIS: ICD-10-CM

## 2019-01-29 PROBLEM — I25.2 HISTORY OF MI (MYOCARDIAL INFARCTION): Status: RESOLVED | Noted: 2017-03-24 | Resolved: 2019-01-29

## 2019-01-29 PROCEDURE — 99204 OFFICE O/P NEW MOD 45 MIN: CPT | Performed by: INTERNAL MEDICINE

## 2019-01-29 ASSESSMENT — ENCOUNTER SYMPTOMS
DEPRESSION: 0
PALPITATIONS: 0
FEVER: 0
EYE DISCHARGE: 0
CHILLS: 0
DIZZINESS: 0
BLURRED VISION: 0
HEARTBURN: 0
BACK PAIN: 1
SHORTNESS OF BREATH: 1
PND: 0
COUGH: 0
NERVOUS/ANXIOUS: 0
BRUISES/BLEEDS EASILY: 0
MYALGIAS: 0
NAUSEA: 0
HEADACHES: 0

## 2019-01-29 NOTE — PROGRESS NOTES
Chief Complaint   Patient presents with   • Edema       Subjective:   Vane Ritter is a 68 y.o. female who presents today in consultation at the request of Jannie Sosa for follow-up of CAD.  In March 2009 the patient had indigestion.  Stress test was abnormal.  2 days later a 3.0 x 15 stent was placed in the mid RCA.  She has had no symptoms since then.  She stopped tobacco at the time.  Most recently, on January 16, she was hospitalized for rhabdomyolysis and abnormal liver function test.  She was hydrated over several days with at least 50 mL of IV fluid.  She was discharged with less aches and pains but with more lower extremity edema and some effort dyspnea.  It is interesting that the follow-up CPK on January 19 was still greater than 2000 and liver function tests remained elevated and is seemingly unchanged.  She was discharged on January 22.  She currently feels tired and short of breath of activity.  Leg edema may be somewhat less since discharge from the hospital on January 19.    During the recent hospitalization, an EKG was normal and an echocardiogram was normal, suggesting that she never had a myocardial infarction.  The course of action in 2009 also suggests that she was having angina that resulted in an elective PCI 2 days after abnormal stress test.      Past Medical History:   Diagnosis Date   • Heart attack (HCC)     2009    • Hypertension      Past Surgical History:   Procedure Laterality Date   • CYST EXCISION      right ankle, RA nodule    • OPEN REDUCTION     • STENT PLACEMENT      2009   • TUBAL COAGULATION LAPAROSCOPIC BILATERAL       History reviewed. No pertinent family history.  Social History     Social History   • Marital status:      Spouse name: N/A   • Number of children: N/A   • Years of education: N/A     Occupational History   • Not on file.     Social History Main Topics   • Smoking status: Former Smoker     Packs/day: 0.50     Years: 35.00     Types:  Cigarettes   • Smokeless tobacco: Never Used   • Alcohol use Yes      Comment: rare   • Drug use: No   • Sexual activity: Not Currently     Partners: Male     Other Topics Concern   • Not on file     Social History Narrative   • No narrative on file     Allergies   Allergen Reactions   • Lisinopril Cough   • Statins [Hmg-Coa-R Inhibitors]      Outpatient Encounter Prescriptions as of 1/29/2019   Medication Sig Dispense Refill   • Evolocumab, REPATHA, 140 MG/ML Solution Auto-injector Inject 1 Each as instructed Once for 1 dose. 1 Each 0   • hydroxychloroquine (PLAQUENIL) 200 MG Tab Take 1 Tab by mouth every day. 14 Tab 0   • predniSONE (DELTASONE) 20 MG Tab Take 1 Tab by mouth every day. 14 Tab 0   • amLODIPine (NORVASC) 5 MG Tab Take 2 Tabs by mouth every day. 30 Tab 0   • albuterol 108 (90 Base) MCG/ACT Aero Soln inhalation aerosol Inhale 2 Puffs by mouth every four hours as needed for Shortness of Breath. 1 Inhaler 0   • aspirin (ASA) 325 MG TABS Take 325 mg by mouth every evening.     • [DISCONTINUED] Evolocumab, REPATHA, 140 MG/ML Solution Auto-injector Inject 1 Each as instructed Once for 1 dose. 1 Each 0     No facility-administered encounter medications on file as of 1/29/2019.      Review of Systems   Constitutional: Negative for chills, fever and malaise/fatigue.   Eyes: Negative for blurred vision and discharge.   Respiratory: Positive for shortness of breath. Negative for cough.    Cardiovascular: Positive for leg swelling. Negative for chest pain, palpitations and PND.   Gastrointestinal: Negative for heartburn and nausea.   Genitourinary: Negative for dysuria and urgency.   Musculoskeletal: Positive for back pain and joint pain. Negative for myalgias.   Skin: Negative for itching and rash.   Neurological: Negative for dizziness and headaches.   Endo/Heme/Allergies: Negative for environmental allergies. Does not bruise/bleed easily.   Psychiatric/Behavioral: Negative for depression. The patient is not  "nervous/anxious.         Objective:   /60 (BP Location: Left arm, Patient Position: Sitting, BP Cuff Size: Adult)   Pulse (!) 102   Ht 1.626 m (5' 4\")   Wt 79.4 kg (175 lb)   SpO2 96%   BMI 30.04 kg/m²     Physical Exam   Constitutional: She is oriented to person, place, and time.   Good historian, mildly obese   Neck: No JVD present.   Cardiovascular: Normal rate and regular rhythm.  Exam reveals gallop. Exam reveals no friction rub.    No murmur heard.  Pulmonary/Chest: Effort normal and breath sounds normal.   Abdominal: Soft. There is no tenderness.   Musculoskeletal: She exhibits edema.   Symmetrical edema to upper calves bilaterally, pitting   Neurological: She is alert and oriented to person, place, and time.   Skin: Skin is warm and dry.       Assessment:     1. Peripheral edema     2. Essential hypertension     3. Mixed hyperlipidemia     4. Coronary artery disease involving native coronary artery of native heart without angina pectoris  CBC WITH DIFFERENTIAL    COMP METABOLIC PANEL    CPK - TOTAL SERUM    BTYPE NATRIURETIC PEPTIDE    WESTERGREN SED RATE       Medical Decision Making:  Today's Assessment / Status / Plan:   Further evaluation of CAD is not indicated with recent normal echo, normal EKG, and no symptoms of \"indigestion\".  Abnormal lab work of greater than 7 days ago and rhabdomyolysis and abnormal liver function tests need follow-up and I have ordered nonfasting lab work today.    When lab work abnormalities are resolved, she will need  SK 9 drugs for her residual lipid abnormality.  Follow-up here in 1 month to reassess everything including the status of our request for the above lipid medication.  Thank you for this consult  "

## 2019-01-29 NOTE — LETTER
[facility]   [address] [place] [address]  LETICIA Massey [address]  Phone: [phone]  Fax: [phone]              Vane Ritter  1950    Encounter Date: 1/29/2019    Lon Berman M.D.          PROGRESS NOTE:  Chief Complaint   Patient presents with   • Edema       Subjective:   Vane Ritter is a 68 y.o. female who presents today in consultation at the request of Jannie Sosa for follow-up of CAD.  In March 2009 the patient had indigestion.  Stress test was abnormal.  2 days later a 3.0 x 15 stent was placed in the mid RCA.  She has had no symptoms since then.  She stopped tobacco at the time.  Most recently, on January 16, she was hospitalized for rhabdomyolysis and abnormal liver function test.  She was hydrated over several days with at least 50 mL of IV fluid.  She was discharged with less aches and pains but with more lower extremity edema and some effort dyspnea.  It is interesting that the follow-up CPK on January 19 was still greater than 2000 and liver function tests remained elevated and is seemingly unchanged.  She was discharged on January 22.  She currently feels tired and short of breath of activity.  Leg edema may be somewhat less since discharge from the hospital on January 19.    During the recent hospitalization, an EKG was normal and an echocardiogram was normal, suggesting that she never had a myocardial infarction.  The course of action in 2009 also suggests that she was having angina that resulted in an elective PCI 2 days after abnormal stress test.      Past Medical History:   Diagnosis Date   • Heart attack (HCC)     2009    • Hypertension      Past Surgical History:   Procedure Laterality Date   • CYST EXCISION      right ankle, RA nodule    • OPEN REDUCTION     • STENT PLACEMENT      2009   • TUBAL COAGULATION LAPAROSCOPIC BILATERAL       History reviewed. No pertinent family history.  Social History     Social  History   • Marital status:      Spouse name: N/A   • Number of children: N/A   • Years of education: N/A     Occupational History   • Not on file.     Social History Main Topics   • Smoking status: Former Smoker     Packs/day: 0.50     Years: 35.00     Types: Cigarettes   • Smokeless tobacco: Never Used   • Alcohol use Yes      Comment: rare   • Drug use: No   • Sexual activity: Not Currently     Partners: Male     Other Topics Concern   • Not on file     Social History Narrative   • No narrative on file     Allergies   Allergen Reactions   • Lisinopril Cough   • Statins [Hmg-Coa-R Inhibitors]      Outpatient Encounter Prescriptions as of 1/29/2019   Medication Sig Dispense Refill   • Evolocumab, REPATHA, 140 MG/ML Solution Auto-injector Inject 1 Each as instructed Once for 1 dose. 1 Each 0   • hydroxychloroquine (PLAQUENIL) 200 MG Tab Take 1 Tab by mouth every day. 14 Tab 0   • predniSONE (DELTASONE) 20 MG Tab Take 1 Tab by mouth every day. 14 Tab 0   • amLODIPine (NORVASC) 5 MG Tab Take 2 Tabs by mouth every day. 30 Tab 0   • albuterol 108 (90 Base) MCG/ACT Aero Soln inhalation aerosol Inhale 2 Puffs by mouth every four hours as needed for Shortness of Breath. 1 Inhaler 0   • aspirin (ASA) 325 MG TABS Take 325 mg by mouth every evening.     • [DISCONTINUED] Evolocumab, REPATHA, 140 MG/ML Solution Auto-injector Inject 1 Each as instructed Once for 1 dose. 1 Each 0     No facility-administered encounter medications on file as of 1/29/2019.      Review of Systems   Constitutional: Negative for chills, fever and malaise/fatigue.   Eyes: Negative for blurred vision and discharge.   Respiratory: Positive for shortness of breath. Negative for cough.    Cardiovascular: Positive for leg swelling. Negative for chest pain, palpitations and PND.   Gastrointestinal: Negative for heartburn and nausea.   Genitourinary: Negative for dysuria and urgency.   Musculoskeletal: Positive for back pain and joint pain. Negative  "for myalgias.   Skin: Negative for itching and rash.   Neurological: Negative for dizziness and headaches.   Endo/Heme/Allergies: Negative for environmental allergies. Does not bruise/bleed easily.   Psychiatric/Behavioral: Negative for depression. The patient is not nervous/anxious.         Objective:   /60 (BP Location: Left arm, Patient Position: Sitting, BP Cuff Size: Adult)   Pulse (!) 102   Ht 1.626 m (5' 4\")   Wt 79.4 kg (175 lb)   SpO2 96%   BMI 30.04 kg/m²      Physical Exam   Constitutional: She is oriented to person, place, and time.   Good historian, mildly obese   Neck: No JVD present.   Cardiovascular: Normal rate and regular rhythm.  Exam reveals gallop. Exam reveals no friction rub.    No murmur heard.  Pulmonary/Chest: Effort normal and breath sounds normal.   Abdominal: Soft. There is no tenderness.   Musculoskeletal: She exhibits edema.   Symmetrical edema to upper calves bilaterally, pitting   Neurological: She is alert and oriented to person, place, and time.   Skin: Skin is warm and dry.       Assessment:     1. Peripheral edema     2. Essential hypertension     3. Mixed hyperlipidemia     4. Coronary artery disease involving native coronary artery of native heart without angina pectoris  CBC WITH DIFFERENTIAL    COMP METABOLIC PANEL    CPK - TOTAL SERUM    BTYPE NATRIURETIC PEPTIDE    WESTERGREN SED RATE       Medical Decision Making:  Today's Assessment / Status / Plan:   Further evaluation of CAD is not indicated with recent normal echo, normal EKG, and no symptoms of \"indigestion\".  Abnormal lab work of greater than 7 days ago and rhabdomyolysis and abnormal liver function tests need follow-up and I have ordered nonfasting lab work today.    When lab work abnormalities are resolved, she will need  SK 9 drugs for her residual lipid abnormality.  Follow-up here in 1 month to reassess everything including the status of our request for the above lipid medication.  Thank you for " this consult      Jannie Sosa, QUIQUERMarryN.  [address] [place] [address]  VIA In Basket

## 2019-01-30 ENCOUNTER — TELEPHONE (OUTPATIENT)
Dept: MEDICAL GROUP | Facility: PHYSICIAN GROUP | Age: 69
End: 2019-01-30

## 2019-01-30 ENCOUNTER — HOSPITAL ENCOUNTER (OUTPATIENT)
Dept: LAB | Facility: MEDICAL CENTER | Age: 69
End: 2019-01-30
Attending: NURSE PRACTITIONER
Payer: MEDICARE

## 2019-01-30 ENCOUNTER — OFFICE VISIT (OUTPATIENT)
Dept: URGENT CARE | Facility: PHYSICIAN GROUP | Age: 69
End: 2019-01-30
Payer: MEDICARE

## 2019-01-30 VITALS
WEIGHT: 180 LBS | SYSTOLIC BLOOD PRESSURE: 112 MMHG | BODY MASS INDEX: 30.73 KG/M2 | DIASTOLIC BLOOD PRESSURE: 68 MMHG | RESPIRATION RATE: 18 BRPM | HEIGHT: 64 IN | OXYGEN SATURATION: 94 % | HEART RATE: 90 BPM | TEMPERATURE: 97.9 F

## 2019-01-30 DIAGNOSIS — T46.6X5A RHABDOMYOLYSIS DUE TO STATIN THERAPY: ICD-10-CM

## 2019-01-30 DIAGNOSIS — M62.82 RHABDOMYOLYSIS DUE TO STATIN THERAPY: ICD-10-CM

## 2019-01-30 DIAGNOSIS — R76.8 ANA POSITIVE: ICD-10-CM

## 2019-01-30 DIAGNOSIS — E78.2 MIXED HYPERLIPIDEMIA: ICD-10-CM

## 2019-01-30 DIAGNOSIS — I25.10 CORONARY ARTERY DISEASE INVOLVING NATIVE CORONARY ARTERY OF NATIVE HEART WITHOUT ANGINA PECTORIS: ICD-10-CM

## 2019-01-30 DIAGNOSIS — R42 FEELING FAINT: ICD-10-CM

## 2019-01-30 DIAGNOSIS — R76.8 POSITIVE ANTI-CCP TEST: ICD-10-CM

## 2019-01-30 DIAGNOSIS — R74.8 ELEVATED CPK: ICD-10-CM

## 2019-01-30 DIAGNOSIS — M06.09 RHEUMATOID ARTHRITIS OF MULTIPLE SITES WITH NEGATIVE RHEUMATOID FACTOR (HCC): ICD-10-CM

## 2019-01-30 DIAGNOSIS — R82.90 ABNORMAL URINALYSIS: ICD-10-CM

## 2019-01-30 LAB
ALBUMIN SERPL BCP-MCNC: 3.1 G/DL (ref 3.2–4.9)
ALBUMIN/GLOB SERPL: 1 G/DL
ALP SERPL-CCNC: 90 U/L (ref 30–99)
ALT SERPL-CCNC: 183 U/L (ref 2–50)
ANION GAP SERPL CALC-SCNC: 8 MMOL/L (ref 0–11.9)
APPEARANCE UR: CLEAR
AST SERPL-CCNC: 124 U/L (ref 12–45)
BASOPHILS # BLD AUTO: 0.7 % (ref 0–1.8)
BASOPHILS # BLD: 0.1 K/UL (ref 0–0.12)
BILIRUB SERPL-MCNC: 0.3 MG/DL (ref 0.1–1.5)
BILIRUB UR STRIP-MCNC: NORMAL MG/DL
BNP SERPL-MCNC: 54 PG/ML (ref 0–100)
BUN SERPL-MCNC: 16 MG/DL (ref 8–22)
CALCIUM SERPL-MCNC: 8.9 MG/DL (ref 8.5–10.5)
CHLORIDE SERPL-SCNC: 101 MMOL/L (ref 96–112)
CK SERPL-CCNC: 2895 U/L (ref 0–154)
CO2 SERPL-SCNC: 27 MMOL/L (ref 20–33)
COLOR UR AUTO: YELLOW
CREAT SERPL-MCNC: 0.68 MG/DL (ref 0.5–1.4)
EOSINOPHIL # BLD AUTO: 0.32 K/UL (ref 0–0.51)
EOSINOPHIL NFR BLD: 2.1 % (ref 0–6.9)
ERYTHROCYTE [DISTWIDTH] IN BLOOD BY AUTOMATED COUNT: 46.3 FL (ref 35.9–50)
ERYTHROCYTE [SEDIMENTATION RATE] IN BLOOD BY WESTERGREN METHOD: 30 MM/HOUR (ref 0–30)
GLOBULIN SER CALC-MCNC: 3 G/DL (ref 1.9–3.5)
GLUCOSE BLD-MCNC: 115 MG/DL (ref 70–100)
GLUCOSE SERPL-MCNC: 80 MG/DL (ref 65–99)
GLUCOSE UR STRIP.AUTO-MCNC: NORMAL MG/DL
HCT VFR BLD AUTO: 37.2 % (ref 37–47)
HGB BLD-MCNC: 12 G/DL (ref 12–16)
IMM GRANULOCYTES # BLD AUTO: 0.2 K/UL (ref 0–0.11)
IMM GRANULOCYTES NFR BLD AUTO: 1.3 % (ref 0–0.9)
KETONES UR STRIP.AUTO-MCNC: NORMAL MG/DL
LEUKOCYTE ESTERASE UR QL STRIP.AUTO: NORMAL
LYMPHOCYTES # BLD AUTO: 3.05 K/UL (ref 1–4.8)
LYMPHOCYTES NFR BLD: 20.1 % (ref 22–41)
MCH RBC QN AUTO: 27.5 PG (ref 27–33)
MCHC RBC AUTO-ENTMCNC: 32.3 G/DL (ref 33.6–35)
MCV RBC AUTO: 85.3 FL (ref 81.4–97.8)
MONOCYTES # BLD AUTO: 1.39 K/UL (ref 0–0.85)
MONOCYTES NFR BLD AUTO: 9.2 % (ref 0–13.4)
NEUTROPHILS # BLD AUTO: 10.11 K/UL (ref 2–7.15)
NEUTROPHILS NFR BLD: 66.6 % (ref 44–72)
NITRITE UR QL STRIP.AUTO: NORMAL
NRBC # BLD AUTO: 0 K/UL
NRBC BLD-RTO: 0 /100 WBC
PH UR STRIP.AUTO: 6.5 [PH] (ref 5–8)
PLATELET # BLD AUTO: 525 K/UL (ref 164–446)
PMV BLD AUTO: 8.8 FL (ref 9–12.9)
POTASSIUM SERPL-SCNC: 3.7 MMOL/L (ref 3.6–5.5)
PROT SERPL-MCNC: 6.1 G/DL (ref 6–8.2)
PROT UR QL STRIP: 30 MG/DL
RBC # BLD AUTO: 4.36 M/UL (ref 4.2–5.4)
RBC UR QL AUTO: NORMAL
SODIUM SERPL-SCNC: 136 MMOL/L (ref 135–145)
SP GR UR STRIP.AUTO: 1.02
UROBILINOGEN UR STRIP-MCNC: 0.2 MG/DL
WBC # BLD AUTO: 15.2 K/UL (ref 4.8–10.8)

## 2019-01-30 PROCEDURE — 83880 ASSAY OF NATRIURETIC PEPTIDE: CPT | Mod: GA

## 2019-01-30 PROCEDURE — 82962 GLUCOSE BLOOD TEST: CPT | Performed by: PHYSICIAN ASSISTANT

## 2019-01-30 PROCEDURE — 36415 COLL VENOUS BLD VENIPUNCTURE: CPT

## 2019-01-30 PROCEDURE — 82550 ASSAY OF CK (CPK): CPT

## 2019-01-30 PROCEDURE — 85652 RBC SED RATE AUTOMATED: CPT

## 2019-01-30 PROCEDURE — 81002 URINALYSIS NONAUTO W/O SCOPE: CPT | Performed by: PHYSICIAN ASSISTANT

## 2019-01-30 PROCEDURE — 80053 COMPREHEN METABOLIC PANEL: CPT

## 2019-01-30 PROCEDURE — 99214 OFFICE O/P EST MOD 30 MIN: CPT | Performed by: PHYSICIAN ASSISTANT

## 2019-01-30 PROCEDURE — 85025 COMPLETE CBC W/AUTO DIFF WBC: CPT

## 2019-01-30 NOTE — PROGRESS NOTES
Chief Complaint   Patient presents with   • Tired     Disoriented/ tired/ Flush       HISTORY OF PRESENT ILLNESS: Patient is a 68 y.o. female who presents today for the following:    Patient comes in for evaluation of recent presyncopal episode.  Patient was here in the clinic, fasting for labs when she was feeling fatigued.  While having her labs drawn she felt presyncopal and was told she was pale and diaphoretic.  Patient did not actually have a syncopal event.  She denies chest pain, shortness of breath, nausea, vomiting.  She was given apple juice and ate part of a granola bar shortly after this event and is feeling better.  Patient was recently hospitalized due to rhabdo that was suspected to be due to a statin.  She was discharged 1/22 with her CPK trending down.  She saw cardiology yesterday and had her CPK repeated today in the lab.  Overall she is continued to feel better from her discharge from the hospital.  She has not had any chest pain, shortness of breath, orthopnea, PND, or leg swelling.      Patient Active Problem List    Diagnosis Date Noted   • Rheumatoid arthritis involving multiple sites ([facility]) 12/13/2018     Priority: High   • Coronary artery disease involving native coronary artery of native heart without angina pectoris 01/29/2019   • Peripheral edema 01/19/2019   • Hyponatremia 01/16/2019   • Transaminitis 01/16/2019   • Rhabdomyolysis due to statin therapy 01/16/2019   • Elevated troponin 01/16/2019   • Ganglion cyst of wrist, right 12/13/2018   • Chronic pain of right knee 06/14/2018   • Seborrheic keratoses 06/14/2018   • Mild intermittent asthma without complication 04/10/2018   • Anxiety 04/10/2018   • Essential hypertension 03/24/2017   • Mixed hyperlipidemia 03/24/2017   • Overweight with body mass index (BMI) 25.0-29.9 03/24/2017       Allergies:Lisinopril and Statins [hmg-coa-r inhibitors]    Current Outpatient Prescriptions Ordered in [facility]   Medication Sig Dispense Refill   •  hydroxychloroquine (PLAQUENIL) 200 MG Tab Take 1 Tab by mouth every day. 14 Tab 0   • predniSONE (DELTASONE) 20 MG Tab Take 1 Tab by mouth every day. 14 Tab 0   • amLODIPine (NORVASC) 5 MG Tab Take 2 Tabs by mouth every day. 30 Tab 0   • albuterol 108 (90 Base) MCG/ACT Aero Soln inhalation aerosol Inhale 2 Puffs by mouth every four hours as needed for Shortness of Breath. 1 Inhaler 0   • aspirin (ASA) 325 MG TABS Take 325 mg by mouth every evening.     • Evolocumab 140 MG/ML Solution Prefilled Syringe Inject 1 mL as instructed every 14 days. (Patient not taking: Reported on 1/30/2019) 6 Syringe 3     No current Epic-ordered facility-administered medications on file.        Past Medical History:   Diagnosis Date   • Heart attack (HCC)     2009    • Hypertension        Social History   Substance Use Topics   • Smoking status: Former Smoker     Packs/day: 0.50     Years: 35.00     Types: Cigarettes   • Smokeless tobacco: Never Used   • Alcohol use Yes      Comment: rare       No family status information on file.   No family history on file.    Review of Systems:   Constitutional ROS: No unexpected change in weight, No weakness, No fatigue  Eye ROS: No recent significant change in vision, No eye pain, redness, discharge  Ear ROS: No drainage, No tinnitus or vertigo, No recent change in hearing  Mouth/Throat ROS: No teeth or gum problems, No bleeding gums, No tongue complaints  Neck ROS: No swollen glands, No significant pain in neck  Pulmonary ROS: No chronic cough, sputum, or hemoptysis, No dyspnea on exertion, No wheezing  Cardiovascular ROS: No diaphoresis, No edema, No palpitations  Gastrointestinal ROS: No change in bowel habits, No significant change in appetite, No nausea, vomiting, diarrhea, or constipation  Musculoskeletal/Extremities ROS: No peripheral edema, No pain, redness or swelling on the joints  Hematologic/Lymphatic ROS: No chills, No night sweats, No weight loss  Skin/Integumentary ROS: No edema, No  "evidence of rash, No itching      Exam:  Blood pressure 112/68, pulse 90, temperature 36.6 °C (97.9 °F), temperature source Temporal, resp. rate 18, height 1.626 m (5' 4\"), weight 81.6 kg (180 lb), SpO2 94 %, not currently breastfeeding.  General: Well developed, well nourished. No distress.  Eye: PERRL/EOMI; conjunctivae clear, lids normal.  ENMT: Lips without lesions, MMM. Oropharynx is clear. Bilateral TMs are within normal limits.  Neck: Trachea midline, no masses. No thyromegaly.  Pulmonary: Unlabored respiratory effort. Lungs clear to auscultation, no wheezes, no rhonchi.  Cardiovascular: Regular rate and rhythm without murmur. No edema.   Abdomen: Soft, non-tender, nondistended. No hepatosplenomegaly.   Neurologic: Grossly nonfocal. No facial asymmetry noted.  Lymph: No cervical lymphadenopathy noted.  Skin: Warm, dry, good turgor. No rashes in visible areas.   Psych: Normal mood. Alert and oriented x3. Judgment and insight is normal.    UA: Trace ketones, moderate blood, 30 protein, trace leukocyte esterase; not enough urine for culture    Glucose: 115    Assessment/Plan:  Suspect patient's symptoms are more due to the cumulative fasting/labs and likely vasovagal.  Patient is looking and feeling much better in the clinic approximately 1 hour after symptoms and shortly after consuming a granola bar and some apple juice.  Have advised that patient had to go home, increase fluids, and eat a meal and to follow-up if she continues to feel poorly.  1. Feeling faint  POCT Glucose    POCT Urinalysis   2. Abnormal urinalysis  URINE CULTURE(NEW)       "

## 2019-01-30 NOTE — TELEPHONE ENCOUNTER
PAR sent to plan:  Vane Ritter Key: [id number] - PA Case ID: [id number]     Status   Sent to [facility]   DrugRepatha SureClick 140MG/ML SC SOAJ   FormOptumRx Medicare Part D Electronic Prior Authorization Form   Repatha® (evolocumab) Copay Card   Eligible patients with commercial insurance pay no more than $5* per month Applies to deductible, coinsurance, and copay for Repatha®.   Up to $5,500 annual maximum. Click here for details.   Open to patients with commercial prescription insurance and who are not enrolled in any government-funded program that pays for prescription drugs. This program is not open to uninsured patients or patients enrolled in any federal, state, or government-funded healthcare program such as Medicare, Medicare Advantage, Medicare Part D, the Retiree Drug Subsidy Program, Medicaid, Medigap, Veterans Affairs (VA), the Department of Defense (DoD) or ®, or where prohibited by law. Cash Discount Cards and other noninsurance plans are not valid as primary under this offer.   If at any time a patient becomes enrolled under any such federal, state, or government-funded healthcare program, he/she will no longer be able to use this card and must call [phone] Web to stop participation. Restrictions may apply. Offer subject to change or discontinuation without notice. This is not health insurance. Patients under 18 years of age are not eligible for this program.   *Subject to plan design.   [phone]

## 2019-01-31 ENCOUNTER — TELEPHONE (OUTPATIENT)
Dept: CARDIOLOGY | Facility: MEDICAL CENTER | Age: 69
End: 2019-01-31

## 2019-01-31 NOTE — TELEPHONE ENCOUNTER
Patient was hospitalized at [facility] from 1/16-1/22/19.  Discharge summary since patient is to follow-up with rheumatology, but she was not referred to rheumatology.  I will refer her to rheumatology today for elevated CCP, CPK, detected SHELIA.

## 2019-02-01 NOTE — TELEPHONE ENCOUNTER
Abnormal LFT's/CPK noted and have not changed much since hospital.  I see that Jannie SAN has reviewed these labs so I will leave this up to her to move forward and I notify Dr. Berman on his return.  I left a message for Vane to call to discuss.

## 2019-02-01 NOTE — TELEPHONE ENCOUNTER
I got a hold of Vane.  She is surprised that her numbers are not coming down.  I let her know that Jannie SAN is aware (as noted in Epic) and she should follow up with her for future lab work and advice.  Vane has a f/v with her next week and Rahel BARKER in our office on 2/28.  She will make those appointments.

## 2019-02-05 ENCOUNTER — OFFICE VISIT (OUTPATIENT)
Dept: MEDICAL GROUP | Facility: PHYSICIAN GROUP | Age: 69
End: 2019-02-05
Payer: MEDICARE

## 2019-02-05 ENCOUNTER — TELEPHONE (OUTPATIENT)
Dept: MEDICAL GROUP | Facility: PHYSICIAN GROUP | Age: 69
End: 2019-02-05

## 2019-02-05 ENCOUNTER — HOSPITAL ENCOUNTER (OUTPATIENT)
Dept: LAB | Facility: MEDICAL CENTER | Age: 69
DRG: 546 | End: 2019-02-05
Attending: NURSE PRACTITIONER
Payer: MEDICARE

## 2019-02-05 VITALS
OXYGEN SATURATION: 98 % | RESPIRATION RATE: 20 BRPM | HEIGHT: 64 IN | HEART RATE: 101 BPM | BODY MASS INDEX: 29.19 KG/M2 | WEIGHT: 171 LBS | DIASTOLIC BLOOD PRESSURE: 76 MMHG | TEMPERATURE: 98.2 F | SYSTOLIC BLOOD PRESSURE: 118 MMHG

## 2019-02-05 DIAGNOSIS — I10 ESSENTIAL HYPERTENSION: ICD-10-CM

## 2019-02-05 DIAGNOSIS — Z09 HOSPITAL DISCHARGE FOLLOW-UP: ICD-10-CM

## 2019-02-05 DIAGNOSIS — R79.89 ELEVATED PLATELET COUNT: ICD-10-CM

## 2019-02-05 DIAGNOSIS — M06.09 RHEUMATOID ARTHRITIS OF MULTIPLE SITES WITH NEGATIVE RHEUMATOID FACTOR (HCC): ICD-10-CM

## 2019-02-05 DIAGNOSIS — R74.8 ELEVATED CREATINE KINASE: ICD-10-CM

## 2019-02-05 LAB
ANION GAP SERPL CALC-SCNC: 6 MMOL/L (ref 0–11.9)
BASOPHILS # BLD AUTO: 0.6 % (ref 0–1.8)
BASOPHILS # BLD: 0.12 K/UL (ref 0–0.12)
BUN SERPL-MCNC: 15 MG/DL (ref 8–22)
CALCIUM SERPL-MCNC: 9 MG/DL (ref 8.5–10.5)
CHLORIDE SERPL-SCNC: 100 MMOL/L (ref 96–112)
CK SERPL-CCNC: 3386 U/L (ref 0–154)
CO2 SERPL-SCNC: 27 MMOL/L (ref 20–33)
CREAT SERPL-MCNC: 0.74 MG/DL (ref 0.5–1.4)
EOSINOPHIL # BLD AUTO: 0.21 K/UL (ref 0–0.51)
EOSINOPHIL NFR BLD: 1 % (ref 0–6.9)
ERYTHROCYTE [DISTWIDTH] IN BLOOD BY AUTOMATED COUNT: 50.4 FL (ref 35.9–50)
GLUCOSE SERPL-MCNC: 89 MG/DL (ref 65–99)
HCT VFR BLD AUTO: 41 % (ref 37–47)
HGB BLD-MCNC: 12.9 G/DL (ref 12–16)
IMM GRANULOCYTES # BLD AUTO: 0.29 K/UL (ref 0–0.11)
IMM GRANULOCYTES NFR BLD AUTO: 1.4 % (ref 0–0.9)
LYMPHOCYTES # BLD AUTO: 1.5 K/UL (ref 1–4.8)
LYMPHOCYTES NFR BLD: 7.2 % (ref 22–41)
MCH RBC QN AUTO: 27.6 PG (ref 27–33)
MCHC RBC AUTO-ENTMCNC: 31.5 G/DL (ref 33.6–35)
MCV RBC AUTO: 87.6 FL (ref 81.4–97.8)
MONOCYTES # BLD AUTO: 1.14 K/UL (ref 0–0.85)
MONOCYTES NFR BLD AUTO: 5.5 % (ref 0–13.4)
NEUTROPHILS # BLD AUTO: 17.61 K/UL (ref 2–7.15)
NEUTROPHILS NFR BLD: 84.3 % (ref 44–72)
NRBC # BLD AUTO: 0 K/UL
NRBC BLD-RTO: 0 /100 WBC
PLATELET # BLD AUTO: 482 K/UL (ref 164–446)
PMV BLD AUTO: 8.9 FL (ref 9–12.9)
POTASSIUM SERPL-SCNC: 3.9 MMOL/L (ref 3.6–5.5)
RBC # BLD AUTO: 4.68 M/UL (ref 4.2–5.4)
SODIUM SERPL-SCNC: 133 MMOL/L (ref 135–145)
WBC # BLD AUTO: 20.9 K/UL (ref 4.8–10.8)

## 2019-02-05 PROCEDURE — 80048 BASIC METABOLIC PNL TOTAL CA: CPT

## 2019-02-05 PROCEDURE — 85025 COMPLETE CBC W/AUTO DIFF WBC: CPT

## 2019-02-05 PROCEDURE — 82550 ASSAY OF CK (CPK): CPT

## 2019-02-05 PROCEDURE — 36415 COLL VENOUS BLD VENIPUNCTURE: CPT

## 2019-02-05 PROCEDURE — 99214 OFFICE O/P EST MOD 30 MIN: CPT | Performed by: NURSE PRACTITIONER

## 2019-02-05 RX ORDER — AMLODIPINE BESYLATE 5 MG/1
10 TABLET ORAL DAILY
Qty: 60 TAB | Refills: 1 | Status: SHIPPED | OUTPATIENT
Start: 2019-02-05 | End: 2019-11-26

## 2019-02-05 RX ORDER — HYDROXYCHLOROQUINE SULFATE 200 MG/1
200 TABLET, FILM COATED ORAL DAILY
Qty: 30 TAB | Refills: 1 | Status: SHIPPED | OUTPATIENT
Start: 2019-02-05 | End: 2019-03-19 | Stop reason: SDUPTHER

## 2019-02-05 RX ORDER — PREDNISONE 20 MG/1
20 TABLET ORAL DAILY
Qty: 30 TAB | Refills: 0 | Status: ON HOLD | OUTPATIENT
Start: 2019-02-05 | End: 2019-02-08

## 2019-02-05 ASSESSMENT — PAIN SCALES - GENERAL: PAINLEVEL: NO PAIN

## 2019-02-05 NOTE — ASSESSMENT & PLAN NOTE
Patient hospitalized at [facility] from 1/16/19 to 1/22/19 for rhabdomyolysis.  Patient was treated with IV fluid and statin was discontinued.  Patient had been taking statin for a couple years.  Her CPK continued to trend down slowly.  She also had positive anti--CCP, ESR, CRP.  Discharge summary says that patient was referred to rheumatology, but this was apparently not done.  During hospitalization patient was found to have an episode of bradycardia.  Her blood pressure medications were changed and she is now on amlodipine 10 mg daily.  She has followed up with cardiology.

## 2019-02-05 NOTE — ASSESSMENT & PLAN NOTE
Most recent CPK is increased.  Patient was urgently referred to rheumatology on 1/30/19 by me.  She reports she has not been contacted.  I have supplied patient with contact information to schedule with Dr. Tracy.  Patient will let me know if she cannot get into his office within a week.  Patient reports continued severe muscle weakness and fatigue.  Reports edema and joint pain is improved.  She is taking Plaquenil and prednisone, which were started during hospitalization.  She is needing refills.  I will refill medications until she can see rheumatology.  Will get updated CPK.  If continuing to increase, I advised patient she will need to go back to the emergency room.    Component      Latest Ref Rng & Units 1/20/2019 1/21/2019 1/21/2019 1/21/2019           8:33 PM 12:53 AM 12:40 PM  8:45 PM   CPK Total      0 - 154 U/L 4053 (HH) 3659 (HH) 3369 (HH) 3152 (HH)     Component      Latest Ref Rng & Units 1/22/2019 1/30/2019           9:16 AM  9:57 AM   CPK Total      0 - 154 U/L 2547 (HH) 2895 (HH)

## 2019-02-05 NOTE — PROGRESS NOTES
CC: Hypertension, muscle weakness and fatigue    HISTORY OF THE PRESENT ILLNESS: Patient is a 68 y.o. female. This pleasant patient is here today for evaluation management of the following health problems.      Essential hypertension  This is a chronic health problem that is well controlled with current medications and lifestyle measures.  During hospitalization, bisoprolol and hydrochlorothiazide were discontinued.  Patient was started on amlodipine 10 mg daily.  Patient tolerating medication, denies lightheadedness.  Does report some mild ankle swelling. The patient denies chest pain, shortness of breath, headache, vision changes, epistaxis, or dyspnea on exertion.      Rheumatoid arthritis involving multiple sites (HCC)  Most recent CPK is increased.  Patient was urgently referred to rheumatology on 1/30/19 by me.  She reports she has not been contacted.  I have supplied patient with contact information to schedule with Dr. Tracy.  Patient will let me know if she cannot get into his office within a week.  Patient reports continued severe muscle weakness and fatigue.  Reports edema and joint pain is improved.  She is taking Plaquenil and prednisone, which were started during hospitalization.  She is needing refills.  I will refill medications until she can see rheumatology.  Will get updated CPK.  If continuing to increase, I advised patient she will need to go back to the emergency room.    Component      Latest Ref Rng & Units 1/20/2019 1/21/2019 1/21/2019 1/21/2019           8:33 PM 12:53 AM 12:40 PM  8:45 PM   CPK Total      0 - 154 U/L 4053 (HH) 3659 (HH) 3369 (HH) 3152 (HH)     Component      Latest Ref Rng & Units 1/22/2019 1/30/2019           9:16 AM  9:57 AM   CPK Total      0 - 154 U/L 2547 (HH) 2895 (HH)       Elevated platelet count  Elevated platelet count noted on last CBC.  Will get updated CBC.  Component      Latest Ref Rng & Units 1/20/2019 1/21/2019 1/30/2019           9:14 AM 12:53 AM  9:57 AM    Platelet Count      164 - 446 K/uL 326 329 525 (H)       Hospital discharge follow-up  Patient hospitalized at [facility] from 1/16/19 to 1/22/19 for rhabdomyolysis.  Patient was treated with IV fluid and statin was discontinued.  Patient had been taking statin for a couple years.  Her CPK continued to trend down slowly.  She also had positive anti--CCP, ESR, CRP.  Discharge summary says that patient was referred to rheumatology, but this was apparently not done.  During hospitalization patient was found to have an episode of bradycardia.  Her blood pressure medications were changed and she is now on amlodipine 10 mg daily.  She has followed up with cardiology.        Allergies: Lisinopril and Statins [hmg-coa-r inhibitors]    Current Outpatient Prescriptions Ordered in [facility]   Medication Sig Dispense Refill   • hydroxychloroquine (PLAQUENIL) 200 MG Tab Take 1 Tab by mouth every day. 30 Tab 1   • predniSONE (DELTASONE) 20 MG Tab Take 1 Tab by mouth every day. 30 Tab 0   • amLODIPine (NORVASC) 5 MG Tab Take 2 Tabs by mouth every day. 60 Tab 1   • Evolocumab 140 MG/ML Solution Prefilled Syringe Inject 1 mL as instructed every 14 days. 6 Syringe 3   • albuterol 108 (90 Base) MCG/ACT Aero Soln inhalation aerosol Inhale 2 Puffs by mouth every four hours as needed for Shortness of Breath. 1 Inhaler 0   • aspirin (ASA) 325 MG TABS Take 325 mg by mouth every evening.       No current Epic-ordered facility-administered medications on file.        Past Medical History:   Diagnosis Date   • Heart attack (HCC)     2009    • Hypertension        Past Surgical History:   Procedure Laterality Date   • CYST EXCISION      right ankle, RA nodule    • OPEN REDUCTION     • STENT PLACEMENT      2009   • TUBAL COAGULATION LAPAROSCOPIC BILATERAL         Social History   Substance Use Topics   • Smoking status: Former Smoker     Packs/day: 0.50     Years: 35.00     Types: Cigarettes   • Smokeless tobacco: Never Used   • Alcohol use No  "      No family history on file.    ROS:   As in HPI, otherwise negative for chest pain, dyspnea, abdominal pain, fever, blood in stool          Exam: Blood pressure 118/76, pulse (!) 101, temperature 36.8 °C (98.2 °F), temperature source Temporal, resp. rate 20, height 1.626 m (5' 4\"), weight 77.6 kg (171 lb), SpO2 98 %, not currently breastfeeding. Body mass index is 29.35 kg/m².    General: Alert, pleasant, well nourished, well developed female in NAD  Neck: Supple without bruit. Thyroid is not enlarged.  Pulmonary: Clear to ausculation.  Normal effort. No rales, ronchi, or wheezing.  Cardiovascular: Normal rate and rhythm without murmur. Carotid and radial pulses are intact and equal bilaterally.  Mild lower extremity edema.  Abdomen: Soft, nontender, nondistended. Normal bowel sounds.   Neurologic: Grossly nonfocal  Lymph: No cervical or supraclavicular lymph nodes are palpable  Skin: Warm and dry.  No obvious lesions.  Musculoskeletal: Normal gait.  Upper and lower extremity strength 3/5 and equal bilaterally.  Psych: Normal mood and affect. Alert and oriented. Judgment and insight is normal.    Please note that this dictation was created using voice recognition software. I have made every reasonable attempt to correct obvious errors, but I expect that there are errors of grammar and possibly content that I did not discover before finalizing the note.      Assessment/Plan  1. Essential hypertension  Continue with amlodipine and lifestyle measures.  - amLODIPine (NORVASC) 5 MG Tab; Take 2 Tabs by mouth every day.  Dispense: 60 Tab; Refill: 1    2. Rheumatoid arthritis of multiple sites with negative rheumatoid factor (HCC)  Continue with Plaquenil and prednisone.  Patient to call and schedule with rheumatology.  She will let me know if she is unable to get in within a week.  ER precautions reviewed with patient.  - hydroxychloroquine (PLAQUENIL) 200 MG Tab; Take 1 Tab by mouth every day.  Dispense: 30 Tab; " Refill: 1  - predniSONE (DELTASONE) 20 MG Tab; Take 1 Tab by mouth every day.  Dispense: 30 Tab; Refill: 0    3. Elevated platelet count  We will notify patient of lab results.  - CBC WITH DIFFERENTIAL; Future    4. Elevated creatine kinase  We will notify patient of lab results.  May need to send back to emergency.  - CREATINE KINASE; Future  - BASIC METABOLIC PANEL; Future  - ESTIMATED GFR; Future    5. Hospital discharge follow-up      Patient will return to clinic in 2 weeks for follow-up and ear fullness.

## 2019-02-05 NOTE — ASSESSMENT & PLAN NOTE
This is a chronic health problem that is well controlled with current medications and lifestyle measures.  During hospitalization, bisoprolol and hydrochlorothiazide were discontinued.  Patient was started on amlodipine 10 mg daily.  Patient tolerating medication, denies lightheadedness.  Does report some mild ankle swelling. The patient denies chest pain, shortness of breath, headache, vision changes, epistaxis, or dyspnea on exertion.

## 2019-02-05 NOTE — ASSESSMENT & PLAN NOTE
Elevated platelet count noted on last CBC.  Will get updated CBC.  Component      Latest Ref Rng & Units 1/20/2019 1/21/2019 1/30/2019           9:14 AM 12:53 AM  9:57 AM   Platelet Count      164 - 446 K/uL 326 329 525 (H)

## 2019-02-06 ENCOUNTER — TELEPHONE (OUTPATIENT)
Dept: CARDIOLOGY | Facility: MEDICAL CENTER | Age: 69
End: 2019-02-06

## 2019-02-06 ENCOUNTER — HOSPITAL ENCOUNTER (INPATIENT)
Facility: MEDICAL CENTER | Age: 69
LOS: 2 days | DRG: 546 | End: 2019-02-08
Attending: EMERGENCY MEDICINE | Admitting: INTERNAL MEDICINE
Payer: MEDICARE

## 2019-02-06 ENCOUNTER — TELEPHONE (OUTPATIENT)
Dept: MEDICAL GROUP | Facility: PHYSICIAN GROUP | Age: 69
End: 2019-02-06

## 2019-02-06 DIAGNOSIS — M62.82 NON-TRAUMATIC RHABDOMYOLYSIS: ICD-10-CM

## 2019-02-06 PROBLEM — M60.9 MYOSITIS OF MULTIPLE SITES: Status: ACTIVE | Noted: 2019-02-06

## 2019-02-06 PROBLEM — D72.829 LEUKOCYTOSIS: Status: ACTIVE | Noted: 2019-02-06

## 2019-02-06 PROBLEM — F17.200 TOBACCO DEPENDENCE: Status: ACTIVE | Noted: 2019-02-06

## 2019-02-06 LAB
ALBUMIN SERPL BCP-MCNC: 3.3 G/DL (ref 3.2–4.9)
ALBUMIN/GLOB SERPL: 0.9 G/DL
ALP SERPL-CCNC: 67 U/L (ref 30–99)
ALT SERPL-CCNC: 180 U/L (ref 2–50)
ANION GAP SERPL CALC-SCNC: 10 MMOL/L (ref 0–11.9)
APPEARANCE UR: CLEAR
AST SERPL-CCNC: 132 U/L (ref 12–45)
BACTERIA #/AREA URNS HPF: NEGATIVE /HPF
BASOPHILS # BLD AUTO: 0.6 % (ref 0–1.8)
BASOPHILS # BLD: 0.12 K/UL (ref 0–0.12)
BILIRUB SERPL-MCNC: 0.4 MG/DL (ref 0.1–1.5)
BILIRUB UR QL STRIP.AUTO: NEGATIVE
BUN SERPL-MCNC: 21 MG/DL (ref 8–22)
CALCIUM SERPL-MCNC: 8.8 MG/DL (ref 8.5–10.5)
CHLORIDE SERPL-SCNC: 101 MMOL/L (ref 96–112)
CK SERPL-CCNC: 3311 U/L (ref 0–154)
CO2 SERPL-SCNC: 23 MMOL/L (ref 20–33)
COLOR UR: YELLOW
CREAT SERPL-MCNC: 0.72 MG/DL (ref 0.5–1.4)
EOSINOPHIL # BLD AUTO: 0.17 K/UL (ref 0–0.51)
EOSINOPHIL NFR BLD: 0.9 % (ref 0–6.9)
EPI CELLS #/AREA URNS HPF: NEGATIVE /HPF
ERYTHROCYTE [DISTWIDTH] IN BLOOD BY AUTOMATED COUNT: 49.5 FL (ref 35.9–50)
GLOBULIN SER CALC-MCNC: 3.8 G/DL (ref 1.9–3.5)
GLUCOSE SERPL-MCNC: 91 MG/DL (ref 65–99)
GLUCOSE UR STRIP.AUTO-MCNC: NEGATIVE MG/DL
HCT VFR BLD AUTO: 39.7 % (ref 37–47)
HGB BLD-MCNC: 12.6 G/DL (ref 12–16)
HYALINE CASTS #/AREA URNS LPF: ABNORMAL /LPF
IMM GRANULOCYTES # BLD AUTO: 0.27 K/UL (ref 0–0.11)
IMM GRANULOCYTES NFR BLD AUTO: 1.4 % (ref 0–0.9)
KETONES UR STRIP.AUTO-MCNC: NEGATIVE MG/DL
LEUKOCYTE ESTERASE UR QL STRIP.AUTO: NEGATIVE
LYMPHOCYTES # BLD AUTO: 1.27 K/UL (ref 1–4.8)
LYMPHOCYTES NFR BLD: 6.5 % (ref 22–41)
MCH RBC QN AUTO: 27.3 PG (ref 27–33)
MCHC RBC AUTO-ENTMCNC: 31.7 G/DL (ref 33.6–35)
MCV RBC AUTO: 85.9 FL (ref 81.4–97.8)
MICRO URNS: ABNORMAL
MONOCYTES # BLD AUTO: 1.06 K/UL (ref 0–0.85)
MONOCYTES NFR BLD AUTO: 5.4 % (ref 0–13.4)
NEUTROPHILS # BLD AUTO: 16.7 K/UL (ref 2–7.15)
NEUTROPHILS NFR BLD: 85.2 % (ref 44–72)
NITRITE UR QL STRIP.AUTO: NEGATIVE
NRBC # BLD AUTO: 0 K/UL
NRBC BLD-RTO: 0 /100 WBC
PH UR STRIP.AUTO: 6 [PH]
PLATELET # BLD AUTO: 422 K/UL (ref 164–446)
PMV BLD AUTO: 8.5 FL (ref 9–12.9)
POTASSIUM SERPL-SCNC: 4 MMOL/L (ref 3.6–5.5)
PROT SERPL-MCNC: 7.1 G/DL (ref 6–8.2)
PROT UR QL STRIP: NEGATIVE MG/DL
RBC # BLD AUTO: 4.62 M/UL (ref 4.2–5.4)
RBC # URNS HPF: ABNORMAL /HPF
RBC UR QL AUTO: ABNORMAL
SODIUM SERPL-SCNC: 134 MMOL/L (ref 135–145)
SP GR UR STRIP.AUTO: 1.01
UROBILINOGEN UR STRIP.AUTO-MCNC: 0.2 MG/DL
WBC # BLD AUTO: 19.6 K/UL (ref 4.8–10.8)
WBC #/AREA URNS HPF: ABNORMAL /HPF

## 2019-02-06 PROCEDURE — 82550 ASSAY OF CK (CPK): CPT

## 2019-02-06 PROCEDURE — 770006 HCHG ROOM/CARE - MED/SURG/GYN SEMI*

## 2019-02-06 PROCEDURE — 99285 EMERGENCY DEPT VISIT HI MDM: CPT

## 2019-02-06 PROCEDURE — 80053 COMPREHEN METABOLIC PANEL: CPT

## 2019-02-06 PROCEDURE — 99407 BEHAV CHNG SMOKING > 10 MIN: CPT | Performed by: INTERNAL MEDICINE

## 2019-02-06 PROCEDURE — 99223 1ST HOSP IP/OBS HIGH 75: CPT | Mod: 25 | Performed by: INTERNAL MEDICINE

## 2019-02-06 PROCEDURE — 85025 COMPLETE CBC W/AUTO DIFF WBC: CPT

## 2019-02-06 PROCEDURE — 81001 URINALYSIS AUTO W/SCOPE: CPT

## 2019-02-06 PROCEDURE — 700105 HCHG RX REV CODE 258: Performed by: EMERGENCY MEDICINE

## 2019-02-06 PROCEDURE — 700105 HCHG RX REV CODE 258: Performed by: INTERNAL MEDICINE

## 2019-02-06 RX ORDER — SODIUM CHLORIDE 9 MG/ML
INJECTION, SOLUTION INTRAVENOUS CONTINUOUS
Status: DISCONTINUED | OUTPATIENT
Start: 2019-02-06 | End: 2019-02-08 | Stop reason: HOSPADM

## 2019-02-06 RX ORDER — SODIUM CHLORIDE 9 MG/ML
1000 INJECTION, SOLUTION INTRAVENOUS ONCE
Status: COMPLETED | OUTPATIENT
Start: 2019-02-06 | End: 2019-02-06

## 2019-02-06 RX ORDER — POLYETHYLENE GLYCOL 3350 17 G/17G
1 POWDER, FOR SOLUTION ORAL
Status: DISCONTINUED | OUTPATIENT
Start: 2019-02-06 | End: 2019-02-08 | Stop reason: HOSPADM

## 2019-02-06 RX ORDER — AMOXICILLIN 250 MG
2 CAPSULE ORAL 2 TIMES DAILY
Status: DISCONTINUED | OUTPATIENT
Start: 2019-02-06 | End: 2019-02-08 | Stop reason: HOSPADM

## 2019-02-06 RX ORDER — BISACODYL 10 MG
10 SUPPOSITORY, RECTAL RECTAL
Status: DISCONTINUED | OUTPATIENT
Start: 2019-02-06 | End: 2019-02-08 | Stop reason: HOSPADM

## 2019-02-06 RX ORDER — ACETAMINOPHEN 325 MG/1
650 TABLET ORAL EVERY 6 HOURS PRN
Status: DISCONTINUED | OUTPATIENT
Start: 2019-02-06 | End: 2019-02-08 | Stop reason: HOSPADM

## 2019-02-06 RX ORDER — ASPIRIN 325 MG
325 TABLET ORAL EVERY EVENING
Status: DISCONTINUED | OUTPATIENT
Start: 2019-02-06 | End: 2019-02-08 | Stop reason: HOSPADM

## 2019-02-06 RX ORDER — AMLODIPINE BESYLATE 10 MG/1
10 TABLET ORAL DAILY
Status: DISCONTINUED | OUTPATIENT
Start: 2019-02-07 | End: 2019-02-08 | Stop reason: HOSPADM

## 2019-02-06 RX ADMIN — SODIUM CHLORIDE 1000 ML: 9 INJECTION, SOLUTION INTRAVENOUS at 12:15

## 2019-02-06 RX ADMIN — SODIUM CHLORIDE: 9 INJECTION, SOLUTION INTRAVENOUS at 20:32

## 2019-02-06 RX ADMIN — SODIUM CHLORIDE 1000 ML: 9 INJECTION, SOLUTION INTRAVENOUS at 15:15

## 2019-02-06 ASSESSMENT — ENCOUNTER SYMPTOMS
SPEECH CHANGE: 0
VOMITING: 0
NAUSEA: 0
COUGH: 0
CONSTIPATION: 0
ABDOMINAL PAIN: 0
CHILLS: 0
SEIZURES: 0
HEADACHES: 0
EYE PAIN: 0
SPUTUM PRODUCTION: 0
DEPRESSION: 0
FALLS: 0
TREMORS: 0
FEVER: 0
PALPITATIONS: 0
BACK PAIN: 0
NECK PAIN: 0
DIZZINESS: 0
PND: 0
DIARRHEA: 0
HEARTBURN: 0
BLURRED VISION: 0
WEIGHT LOSS: 0
SHORTNESS OF BREATH: 0
WEAKNESS: 1
WHEEZING: 0

## 2019-02-06 ASSESSMENT — PATIENT HEALTH QUESTIONNAIRE - PHQ9
SUM OF ALL RESPONSES TO PHQ9 QUESTIONS 1 AND 2: 0
1. LITTLE INTEREST OR PLEASURE IN DOING THINGS: NOT AT ALL
2. FEELING DOWN, DEPRESSED, IRRITABLE, OR HOPELESS: NOT AT ALL

## 2019-02-06 ASSESSMENT — LIFESTYLE VARIABLES
DO YOU DRINK ALCOHOL: NO
SUBSTANCE_ABUSE: 0

## 2019-02-06 NOTE — ED TRIAGE NOTES
Chief Complaint   Patient presents with   • Sent by MD     abnormal labs    pt ambulated to triage, sent by doctor's office for elevated cpk 3,386 and wbc 20.9. Pt recently d/c from hospital for rhabdomyolysis related to statin medication.

## 2019-02-06 NOTE — ED NOTES
Pt presents with weakness secondary to elevated CPK and prior rhabdomyolysis as documented prior.  Pt denies any CP or SOA.  She states increased swelling to her legs bilaterally and generalized weakness.      A&O x4, Heart SR clear, Lungs CTA, abdomen soft nontender.  1+ pitting edema to bilateral lower extremities.

## 2019-02-06 NOTE — LETTER
February 6, 2019        Vane Ritter  [address] [address]        Dear Vane:    Please call our office prior to starting Repatha.  Dr. Berman does not want you to start until the CPK and liver function tests are normal.    If you have any questions or concerns, please don't hesitate to call.    Sincerely,        ALMA Mccarthy / Dr. Berman

## 2019-02-06 NOTE — TELEPHONE ENCOUNTER
----- Message from Lon Berman M.D. sent at 2/6/2019  8:22 AM PST -----    Usual dose of Repatha    However, given abnormalities of CPK and liver function tests, instituting Repatha will be in the distant future after the above abnormalities are resolved.

## 2019-02-06 NOTE — TELEPHONE ENCOUNTER
"KUNAL Garnica. covering for Jannie Sosa, KUNAL.   Received call after hours regard critical lab, creatinine kinase evelated over 3300. Pt was called and advised to go to ER per PCPs note from today. Pt states she is not going in to hospital. Advised matthew if [facility] in [place] is too far. States \"I'm not going to [place]\". She understands the risk associated with not going to ER in light of her critical labs, worried about the roads and the weather, and she has to go to work in the am. Strongly advised her otherwise and states she'll go in tomorrow.  "

## 2019-02-06 NOTE — CONSULTS
Called by ED regarding elevated CK in setting of normal renal fxn.  She was recently here for elevated CK thought to be due to statin and had lab work that showed elevated SHELIA and anti CCP ab.  Rheumatology referral was made and appt is in July 2019.  She presented to ED with CK 3000 range again after trending down in the hospital last month.  No CATHY then or currently.  No hyperK or hyperPO4.  She seems to be drinking 2-3 L water daily.  At this time advised to increase fluid intake to 3-4L daily and monitor weekly CK/SCr levels, if the CK level remains elevated or SCr rises at that time she should be admitted for further evaluation.  She does have hematuria on UA but that could be due to myoglobinuria.  She also still has hyaline casts although improved from prior lab last month.     If further questions arise or PCP would like us to follow her in clinic, please contact our office at [phone] and we will get her an appt to see someone for further evaluation.

## 2019-02-06 NOTE — ED PROVIDER NOTES
ED Provider Note    CHIEF COMPLAINT  Chief Complaint   Patient presents with   • Sent by MD     abnormal labs        HPI  Vane Ritter is a 68 y.o. female who presents with complaint of general fatigue.  Patient has long-standing history of arthralgia, states she has never been told whether she has rheumatoid arthritis or not.  She was admitted 2 weeks ago and rhabdomyolysis, felt to be secondary to statin medication.  She is now been off these medications for approximately 2 weeks, yesterday labs were obtained by primary care doctor and she shows persistent elevated CPK.  Patient also has rising white blood cell count, currently taking steroids since discharge.  She states her arthritis feels better.  No chest pain, no shortness of breath, no fever.  No vomiting or diarrhea.  Patient has been struggling to obtain follow-up with rheumatology secondary to referral problems, she last left message with her primary doctor regarding the required information to fax to rheumatology for this consultation yesterday.  No dysuria.  No hematuria.  She denies trauma.  Blood work during her last visit was positive for CCP antibodies according to the note found in some rheumatoid arthritis patients.  She also had detectable antinuclear antibody.    REVIEW OF SYSTEMS    Constitutional: Fatigue  Respiratory: No shortness of breath  Cardiac: No chest pain or syncope  Gastrointestinal: No abdominal pain, no vomiting  Musculoskeletal: Arthritis, noted to be much improved since taking prednisone.  Denies myalgia  Neurologic: Denies headache       All other systems are negative.       PAST MEDICAL HISTORY  Past Medical History:   Diagnosis Date   • Heart attack (HCC)     2009    • Hypertension        FAMILY HISTORY  History reviewed. No pertinent family history.    SOCIAL HISTORY  Social History     Social History   • Marital status:      Spouse name: N/A   • Number of children: N/A   • Years of education: N/A     Social  "History Main Topics   • Smoking status: Former Smoker     Packs/day: 0.50     Years: 35.00     Types: Cigarettes   • Smokeless tobacco: Never Used   • Alcohol use No   • Drug use: No   • Sexual activity: Not Currently     Partners: Male     Other Topics Concern   • Not on file     Social History Narrative   • No narrative on file       SURGICAL HISTORY  Past Surgical History:   Procedure Laterality Date   • CYST EXCISION      right ankle, RA nodule    • OPEN REDUCTION     • STENT PLACEMENT      2009   • TUBAL COAGULATION LAPAROSCOPIC BILATERAL         CURRENT MEDICATIONS  Home Medications     Reviewed by Beckie Collazo R.N. (Registered Nurse) on 02/06/19 at 1020  Med List Status: Complete   Medication Last Dose Status   albuterol 108 (90 Base) MCG/ACT Aero Soln inhalation aerosol prn Active   amLODIPine (NORVASC) 5 MG Tab 2/6/2019 Active   aspirin (ASA) 325 MG TABS 2/5/2019 Active   Evolocumab 140 MG/ML Solution Prefilled Syringe  Active   hydroxychloroquine (PLAQUENIL) 200 MG Tab 2/6/2019 Active   predniSONE (DELTASONE) 20 MG Tab 2/6/2019 Active                ALLERGIES  Allergies   Allergen Reactions   • Lisinopril Cough   • Statins [Hmg-Coa-R Inhibitors]        PHYSICAL EXAM  VITAL SIGNS: /68   Pulse 98   Temp 37 °C (98.6 °F)   Resp 20   Ht 1.626 m (5' 4\")   Wt 77.2 kg (170 lb 3.1 oz)   SpO2 92%   BMI 29.21 kg/m²   Constitutional:  Non-toxic appearance.   HENT: No facial swelling  Eyes: Anicteric, no conjunctivitis.     Cardiovascular: Normal heart rate, Normal rhythm  Pulmonary:  No wheezing, No rales.   Gastrointestinal: Soft, No tenderness, No masses  Skin: Warm, Dry, No cyanosis.   Neurologic: Speech is clear, follows commands, facial expression is symmetrical.  Psychiatric: Affect normal,  Mood normal.  Patient is calm and cooperative  Musculoskeletal: Arthralgia in the hands, several other joints, patient states much improved feeling better    EKG/Labs  Results for orders placed or performed " during the hospital encounter of 02/06/19   CBC WITH DIFFERENTIAL   Result Value Ref Range    WBC 19.6 (H) 4.8 - 10.8 K/uL    RBC 4.62 4.20 - 5.40 M/uL    Hemoglobin 12.6 12.0 - 16.0 g/dL    Hematocrit 39.7 37.0 - 47.0 %    MCV 85.9 81.4 - 97.8 fL    MCH 27.3 27.0 - 33.0 pg    MCHC 31.7 (L) 33.6 - 35.0 g/dL    RDW 49.5 35.9 - 50.0 fL    Platelet Count 422 164 - 446 K/uL    MPV 8.5 (L) 9.0 - 12.9 fL    Neutrophils-Polys 85.20 (H) 44.00 - 72.00 %    Lymphocytes 6.50 (L) 22.00 - 41.00 %    Monocytes 5.40 0.00 - 13.40 %    Eosinophils 0.90 0.00 - 6.90 %    Basophils 0.60 0.00 - 1.80 %    Immature Granulocytes 1.40 (H) 0.00 - 0.90 %    Nucleated RBC 0.00 /100 WBC    Neutrophils (Absolute) 16.70 (H) 2.00 - 7.15 K/uL    Lymphs (Absolute) 1.27 1.00 - 4.80 K/uL    Monos (Absolute) 1.06 (H) 0.00 - 0.85 K/uL    Eos (Absolute) 0.17 0.00 - 0.51 K/uL    Baso (Absolute) 0.12 0.00 - 0.12 K/uL    Immature Granulocytes (abs) 0.27 (H) 0.00 - 0.11 K/uL    NRBC (Absolute) 0.00 K/uL   COMP METABOLIC PANEL   Result Value Ref Range    Sodium 134 (L) 135 - 145 mmol/L    Potassium 4.0 3.6 - 5.5 mmol/L    Chloride 101 96 - 112 mmol/L    Co2 23 20 - 33 mmol/L    Anion Gap 10.0 0.0 - 11.9    Glucose 91 65 - 99 mg/dL    Bun 21 8 - 22 mg/dL    Creatinine 0.72 0.50 - 1.40 mg/dL    Calcium 8.8 8.5 - 10.5 mg/dL    AST(SGOT) 132 (H) 12 - 45 U/L    ALT(SGPT) 180 (H) 2 - 50 U/L    Alkaline Phosphatase 67 30 - 99 U/L    Total Bilirubin 0.4 0.1 - 1.5 mg/dL    Albumin 3.3 3.2 - 4.9 g/dL    Total Protein 7.1 6.0 - 8.2 g/dL    Globulin 3.8 (H) 1.9 - 3.5 g/dL    A-G Ratio 0.9 g/dL   CREATINE KINASE   Result Value Ref Range    CPK Total 3311 (HH) 0 - 154 U/L   URINALYSIS,CULTURE IF INDICATED   Result Value Ref Range    Color Yellow     Character Clear     Specific Gravity 1.010 <1.035    Ph 6.0 5.0 - 8.0    Glucose Negative Negative mg/dL    Ketones Negative Negative mg/dL    Protein Negative Negative mg/dL    Bilirubin Negative Negative    Urobilinogen,  Urine 0.2 Negative    Nitrite Negative Negative    Leukocyte Esterase Negative Negative    Occult Blood Moderate (A) Negative    Micro Urine Req Microscopic    ESTIMATED GFR   Result Value Ref Range    GFR If African American >60 >60 mL/min/1.73 m 2    GFR If Non African American >60 >60 mL/min/1.73 m 2           COURSE & MEDICAL DECISION MAKING  Pertinent Labs & Imaging studies reviewed. (See chart for details)  I discussed the case with nephrology on-call, when and if the patient is discharged, she is to have once a week labs to continue to reassess CPK and renal function until either the problem resolves or kidneys worsen and she requires readmission to the hospital.  Patient has successfully obtained rheumatology appointment as an outpatient however it is not until July.  I discussed this with the [facility] hospitalist, given the patient has been off her statin medication for 2 weeks and still has rhabdomyolysis, likely etiology is something different.  She has evidence of autoimmune disease, with evidence of rheumatoid arthritis and positive SHELIA.  Her cardiologist has apparently recommended discontinuation of prednisone and muscle biopsy for further diagnosis.  The hospitalist has been agreeable for admission to hospital, further testing and rheumatology consultation in hospital.  At recommendation of nephrologist, patient has been given 2 L of normal saline IV hydration  HYDRATION: Based on the patient's presentation of Other Rhabdomyolysis the patient was given IV fluids. IV Hydration was used because oral hydration was not adequate alone. Upon recheck following hydration, the patient was Stable.      FINAL IMPRESSION     1. Non-traumatic rhabdomyolysis                    Electronically signed by: Regino Laughlin, 2/6/2019 1:29 PM

## 2019-02-06 NOTE — TELEPHONE ENCOUNTER
Phone Number Called: [phone]    Message: Left voice message for Doroteo Tracy's Office-Rheumatology to call [phone]. Patient who is currently in [facility] ER is stating that Rheumatology is requesting more information from our office for before patient can be scheduled.   Received a call from ER Nurse stating she can not get a hold of Dr. Tracy's Office. Advised ER Nurse I can reach out to his office and see what is needed. Patient can not be discharged until we hear back from him. No right fax correspondence has been received from Dr Tracy's office.    Left Message for patient to call back: yes

## 2019-02-06 NOTE — H&P
Hospital Medicine History and Physical    Date of Service  2/6/2019    Chief Complaint  Chief Complaint   Patient presents with   • Sent by MD     abnormal labs        History of Presenting Illness  68 y.o. female with a past medical history of heart disease, hypertension essential, recently diagnosed of rhabdomyelitis presumably from statin presented 2/6/2019 with complaint of weakness and general fatigue.  Apparently patient has long-standing history of arthralgia, states she has never been told whether she has rheumatoid arthritis or not.    From chart review patient was admitted 2 weeks ago for elevated CPK.  At that time it was found to be secondary to statin medication.  Patient said she has been off medication for the past 2 weeks however she still feels general fatigue.  She went to her primary care physician and got a lab test to recheck and showing still elevated CPK.  Patient's kidney function remained stable.  Patient was then recommended to come to the hospital for further evaluation.  Patient has been taking steroid as outpatient since last time she was discharged.  Apparently patient had outpatient first appointment with rheumatology in 4 months.  But obviously patient's condition cannot wait that long.  Patient is a recommended to be admitted to the hospital for further evaluation.  Patient does not have significant focal neurological deficit, she also denies significant pain.  Patient's main complaint is weakness and fatigue.  Patient otherwise denies fever, chills, nausea, vomiting, adb pain, SOB, CP, headache, constipation, diarrhea, cough, or sputum.      Primary Care Physician  AVA [email or web address].    Consultants  none    Code Status  Code: Full code    Review of Systems  Review of Systems   Constitutional: Positive for malaise/fatigue. Negative for chills, fever and weight loss.   HENT: Negative for congestion, ear discharge, ear pain, hearing loss and nosebleeds.    Eyes: Negative  for blurred vision and pain.   Respiratory: Negative for cough, sputum production, shortness of breath and wheezing.    Cardiovascular: Negative for chest pain, palpitations, leg swelling and PND.   Gastrointestinal: Negative for abdominal pain, constipation, diarrhea, heartburn, nausea and vomiting.   Genitourinary: Negative for dysuria, frequency and hematuria.   Musculoskeletal: Negative for back pain, falls, joint pain and neck pain.   Skin: Negative for rash.   Neurological: Positive for weakness. Negative for dizziness, tremors, speech change, seizures and headaches.   Psychiatric/Behavioral: Negative for depression, substance abuse and suicidal ideas.      Past Medical History  Past Medical History:   Diagnosis Date   • Heart attack (HCC)     2009    • Hypertension        Surgical History  Past Surgical History:   Procedure Laterality Date   • CYST EXCISION      right ankle, RA nodule    • OPEN REDUCTION     • STENT PLACEMENT      2009   • TUBAL COAGULATION LAPAROSCOPIC BILATERAL         Medications  No current facility-administered medications on file prior to encounter.      Current Outpatient Prescriptions on File Prior to Encounter   Medication Sig Dispense Refill   • hydroxychloroquine (PLAQUENIL) 200 MG Tab Take 1 Tab by mouth every day. 30 Tab 1   • predniSONE (DELTASONE) 20 MG Tab Take 1 Tab by mouth every day. 30 Tab 0   • amLODIPine (NORVASC) 5 MG Tab Take 2 Tabs by mouth every day. 60 Tab 1   • aspirin (ASA) 325 MG TABS Take 325 mg by mouth every evening.         Family History    reviewed and felt non pertinent to this encounter     Social History  Social History   Substance Use Topics   • Smoking status: Former Smoker     Packs/day: 0.50     Years: 35.00     Types: Cigarettes   • Smokeless tobacco: Never Used   • Alcohol use No       Allergies  Allergies   Allergen Reactions   • Lisinopril Cough   • Statins [Hmg-Coa-R Inhibitors]         Physical Exam  Laboratory   Vitals/ General Appearance:  "  Weight/BMI: Body mass index is 29.21 kg/m².  Blood pressure 147/68, pulse (!) 111, temperature 37 °C (98.6 °F), resp. rate 20, height 1.626 m (5' 4\"), weight 77.2 kg (170 lb 3.1 oz), SpO2 96 %, not currently breastfeeding.   Vitals:    02/06/19 1016 02/06/19 1241 02/06/19 1330 02/06/19 1400   BP:  147/68     Pulse:  98 100 (!) 111   Resp:       Temp:  37 °C (98.6 °F)     TempSrc:       SpO2:   95% 96%   Weight: 77.2 kg (170 lb 3.1 oz)      Height:        Oxygen Therapy:  Pulse Oximetry: 96 %, O2 Delivery: None (Room Air)    Constitutional:  well developed, well nourished, non-toxic, no acute distress  HENMT: Normocephalic, atraumatic, b/l ears normal, nose normal  Eyes:  EOMI, conjunctiva normal, no discharge  Neck: no tracheal deviation, supple  Cardiovascular: normal heart rate, normal rhythm, no murmurs, no rubs or gallops; no cyanosis, clubbing or edema  Lungs: Respiratory effort is normal, normal breath sounds, breath sounds clear to auscultation b/l, no rales, rhonchi or wheezing  Abdomen: soft, non-tender, no guarding or rebound, active BS, no mass  Skin: warm, dry, no erythema, no rash  Neurologic: Alert and oriented, strength 5/5, no focal deficits, CN II-XII normal  Psychiatric: No anxiety or depression  Lymph node: No lymphadenopathy appreciated in the neck groin and axillary area.   Extremities: Bilateral lower extremities no pitting edema, bilateral pulses symmetric          Assessment/Plan  * Myositis of multiple sites- (present on admission)   Assessment & Plan    Patient has significant elevated CPK.  Recent admission for the similar reason however this time unlikely due to statin.  Patient possibly have inflammatory myositis process.  Recommend consult rheumatologist during working hours.  Patient does have a history of rheumatoid arthritis involving multiple sites.  Currently patient says symptoms stable.  Patient has been on a steroid as outpatient.  We will hold of steroid given concern of " steroid can also induce myositis.  Patient will need further rheumatology follow-up during the hospital stay..  Patient also may need muscle biopsy.     Rheumatoid arthritis involving multiple sites (HCC)- (present on admission)   Assessment & Plan    Patient does have a history of rheumatoid arthritis involving multiple sites.  Currently patient says symptoms stable.  Patient has been on a steroid as outpatient.  We will hold of steroid given concern of steroid can also induce myositis.  Patient will need further rheumatology follow-up during the hospital stay..  Patient also may need muscle biopsy.  I hold patient's steroid and Plaquenil for now awaiting for further recommendation by rheumatology.     Tobacco dependence- (present on admission)   Assessment & Plan    - Smoking cessation education provided  - Nicotine patch  I spent 11 minutes on tobacco cessation counseling including nicotine patches, gum, and dangers of smoking. Also discussed options of nicotine patch, medical treatment with wellbutrin and chantix. Discussed the risks of smoking including increased risk of heart disease, stroke, cancer and COPD. Discussed the benefits of quitting smoking. Nicotine replacement protocol provided to the patient.    The pt indicated that will quit.     51911 (smoking and tobacco cessation counseling visit; intensive, greater than 10 minutes).       Leukocytosis- (present on admission)   Assessment & Plan    Likely due to chronic steroid use.  I Hold of steroid     Coronary artery disease involving native coronary artery of native heart without angina pectoris- (present on admission)   Assessment & Plan    Stable  No CP     Essential hypertension- (present on admission)   Assessment & Plan    Blood pressure stable  Continue home medication           I anticipate this patient will require at least two midnights for appropriate medical management, necessitating inpatient admission.    Prophylaxis: lovenox    Recent Labs       02/05/19   1144  02/06/19   1130   WBC  20.9*  19.6*   RBC  4.68  4.62   HEMOGLOBIN  12.9  12.6   HEMATOCRIT  41.0  39.7   MCV  87.6  85.9   MCH  27.6  27.3   MCHC  31.5*  31.7*   RDW  50.4*  49.5   PLATELETCT  482*  422   MPV  8.9*  8.5*     Recent Labs      02/05/19   1144  02/06/19   1130   SODIUM  133*  134*   POTASSIUM  3.9  4.0   CHLORIDE  100  101   CO2  27  23   GLUCOSE  89  91   BUN  15  21   CREATININE  0.74  0.72   CALCIUM  9.0  8.8     Recent Labs      02/05/19   1144  02/06/19   1130   ALTSGPT   --   180*   ASTSGOT   --   132*   ALKPHOSPHAT   --   67   TBILIRUBIN   --   0.4   GLUCOSE  89  91                 Lab Results   Component Value Date    TROPONINI 0.14 (H) 01/17/2019       Imaging  No orders to display          I have discussed patient admission status with  in the ER.    I evaluated the patient, reviewing the chart, vitals, labs and imaging, discussing the case with ED physician, medication reconciliation, placing orders and enacting the plan above.

## 2019-02-07 PROBLEM — M62.82 NON-TRAUMATIC RHABDOMYOLYSIS: Status: ACTIVE | Noted: 2019-02-07

## 2019-02-07 PROBLEM — D64.9 NORMOCYTIC ANEMIA: Status: ACTIVE | Noted: 2019-02-07

## 2019-02-07 PROBLEM — M13.0 POLYARTHRITIS: Status: ACTIVE | Noted: 2018-12-13

## 2019-02-07 LAB
ANION GAP SERPL CALC-SCNC: 8 MMOL/L (ref 0–11.9)
BASOPHILS # BLD AUTO: 0.6 % (ref 0–1.8)
BASOPHILS # BLD: 0.08 K/UL (ref 0–0.12)
BUN SERPL-MCNC: 21 MG/DL (ref 8–22)
CALCIUM SERPL-MCNC: 8.3 MG/DL (ref 8.5–10.5)
CHLORIDE SERPL-SCNC: 103 MMOL/L (ref 96–112)
CK SERPL-CCNC: 3060 U/L (ref 0–154)
CO2 SERPL-SCNC: 23 MMOL/L (ref 20–33)
CREAT SERPL-MCNC: 0.85 MG/DL (ref 0.5–1.4)
CRP SERPL HS-MCNC: 2.5 MG/DL (ref 0–0.75)
EOSINOPHIL # BLD AUTO: 0.19 K/UL (ref 0–0.51)
EOSINOPHIL NFR BLD: 1.4 % (ref 0–6.9)
ERYTHROCYTE [DISTWIDTH] IN BLOOD BY AUTOMATED COUNT: 48.8 FL (ref 35.9–50)
ERYTHROCYTE [SEDIMENTATION RATE] IN BLOOD BY WESTERGREN METHOD: 43 MM/HOUR (ref 0–30)
GLUCOSE SERPL-MCNC: 87 MG/DL (ref 65–99)
HCT VFR BLD AUTO: 33.7 % (ref 37–47)
HGB BLD-MCNC: 11 G/DL (ref 12–16)
IMM GRANULOCYTES # BLD AUTO: 0.18 K/UL (ref 0–0.11)
IMM GRANULOCYTES NFR BLD AUTO: 1.3 % (ref 0–0.9)
LDH SERPL L TO P-CCNC: 460 U/L (ref 107–266)
LYMPHOCYTES # BLD AUTO: 2.57 K/UL (ref 1–4.8)
LYMPHOCYTES NFR BLD: 19.1 % (ref 22–41)
MCH RBC QN AUTO: 27.6 PG (ref 27–33)
MCHC RBC AUTO-ENTMCNC: 32.6 G/DL (ref 33.6–35)
MCV RBC AUTO: 84.7 FL (ref 81.4–97.8)
MONOCYTES # BLD AUTO: 1.13 K/UL (ref 0–0.85)
MONOCYTES NFR BLD AUTO: 8.4 % (ref 0–13.4)
NEUTROPHILS # BLD AUTO: 9.28 K/UL (ref 2–7.15)
NEUTROPHILS NFR BLD: 69.2 % (ref 44–72)
NRBC # BLD AUTO: 0 K/UL
NRBC BLD-RTO: 0 /100 WBC
PLATELET # BLD AUTO: 364 K/UL (ref 164–446)
PMV BLD AUTO: 8.5 FL (ref 9–12.9)
POTASSIUM SERPL-SCNC: 3.7 MMOL/L (ref 3.6–5.5)
RBC # BLD AUTO: 3.98 M/UL (ref 4.2–5.4)
SODIUM SERPL-SCNC: 134 MMOL/L (ref 135–145)
TSH SERPL DL<=0.005 MIU/L-ACNC: 3.22 UIU/ML (ref 0.38–5.33)
WBC # BLD AUTO: 13.4 K/UL (ref 4.8–10.8)

## 2019-02-07 PROCEDURE — 700105 HCHG RX REV CODE 258: Performed by: FAMILY MEDICINE

## 2019-02-07 PROCEDURE — 36415 COLL VENOUS BLD VENIPUNCTURE: CPT

## 2019-02-07 PROCEDURE — A9270 NON-COVERED ITEM OR SERVICE: HCPCS | Performed by: FAMILY MEDICINE

## 2019-02-07 PROCEDURE — 82550 ASSAY OF CK (CPK): CPT

## 2019-02-07 PROCEDURE — 85652 RBC SED RATE AUTOMATED: CPT

## 2019-02-07 PROCEDURE — 84443 ASSAY THYROID STIM HORMONE: CPT

## 2019-02-07 PROCEDURE — 86140 C-REACTIVE PROTEIN: CPT

## 2019-02-07 PROCEDURE — 700102 HCHG RX REV CODE 250 W/ 637 OVERRIDE(OP): Performed by: FAMILY MEDICINE

## 2019-02-07 PROCEDURE — 700111 HCHG RX REV CODE 636 W/ 250 OVERRIDE (IP): Performed by: FAMILY MEDICINE

## 2019-02-07 PROCEDURE — A9270 NON-COVERED ITEM OR SERVICE: HCPCS | Performed by: INTERNAL MEDICINE

## 2019-02-07 PROCEDURE — 99233 SBSQ HOSP IP/OBS HIGH 50: CPT | Performed by: FAMILY MEDICINE

## 2019-02-07 PROCEDURE — 83615 LACTATE (LD) (LDH) ENZYME: CPT

## 2019-02-07 PROCEDURE — 700102 HCHG RX REV CODE 250 W/ 637 OVERRIDE(OP): Performed by: INTERNAL MEDICINE

## 2019-02-07 PROCEDURE — 770006 HCHG ROOM/CARE - MED/SURG/GYN SEMI*

## 2019-02-07 PROCEDURE — 80048 BASIC METABOLIC PNL TOTAL CA: CPT

## 2019-02-07 PROCEDURE — 85025 COMPLETE CBC W/AUTO DIFF WBC: CPT

## 2019-02-07 RX ORDER — PREDNISONE 5 MG/1
5 TABLET ORAL DAILY
Status: DISCONTINUED | OUTPATIENT
Start: 2019-02-07 | End: 2019-02-08 | Stop reason: HOSPADM

## 2019-02-07 RX ORDER — HYDROXYCHLOROQUINE SULFATE 200 MG/1
200 TABLET, FILM COATED ORAL DAILY
Status: DISCONTINUED | OUTPATIENT
Start: 2019-02-07 | End: 2019-02-08 | Stop reason: HOSPADM

## 2019-02-07 RX ADMIN — PREDNISONE 5 MG: 5 TABLET ORAL at 18:03

## 2019-02-07 RX ADMIN — AMLODIPINE BESYLATE 10 MG: 10 TABLET ORAL at 06:10

## 2019-02-07 RX ADMIN — ASPIRIN 325 MG: 325 TABLET ORAL at 18:03

## 2019-02-07 RX ADMIN — SODIUM CHLORIDE: 9 INJECTION, SOLUTION INTRAVENOUS at 08:44

## 2019-02-07 RX ADMIN — HYDROXYCHLOROQUINE SULFATE 200 MG: 200 TABLET, FILM COATED ORAL at 12:25

## 2019-02-07 RX ADMIN — SODIUM CHLORIDE: 9 INJECTION, SOLUTION INTRAVENOUS at 22:02

## 2019-02-07 ASSESSMENT — LIFESTYLE VARIABLES
SUBSTANCE_ABUSE: 0
EVER_SMOKED: YES

## 2019-02-07 ASSESSMENT — COGNITIVE AND FUNCTIONAL STATUS - GENERAL
DAILY ACTIVITIY SCORE: 24
SUGGESTED CMS G CODE MODIFIER MOBILITY: CH
MOBILITY SCORE: 24
SUGGESTED CMS G CODE MODIFIER DAILY ACTIVITY: CH

## 2019-02-07 ASSESSMENT — ENCOUNTER SYMPTOMS
FEVER: 0
PALPITATIONS: 0
HEARTBURN: 0
HEADACHES: 0
HEMOPTYSIS: 0
DEPRESSION: 0
MYALGIAS: 0
ORTHOPNEA: 0
VOMITING: 0
SPUTUM PRODUCTION: 0
BLURRED VISION: 0
NAUSEA: 0
COUGH: 0
DOUBLE VISION: 0
DIZZINESS: 0
NECK PAIN: 0
CHILLS: 0

## 2019-02-07 NOTE — ASSESSMENT & PLAN NOTE
Has polyarthralgia and polyarthritis suspecious for rheumatoid arthritis. Anti CCP was positive, RF negative.   Symptoms responded to steroids.   She has history of right knee injury since she was a child.  Unfortunately there is no inpatient rheumatolgy services and patient is scheduled to see one as outpatient in July. Will send mass referral hoping to get earlier appointment.   In the meantime, will hold steroids for now as patient is asymptomatic.    Will continue plaquenil.

## 2019-02-07 NOTE — ASSESSMENT & PLAN NOTE
S/P PCI to RCA 2009.   Clinically stable.   Off statin due to rhabdomyalisis.   Patient is a good candidate for PCSK 9 medications.

## 2019-02-07 NOTE — ASSESSMENT & PLAN NOTE
With elevated CPK, ESR, CRP, LFT's and LDH. No skin pathology. Suspecting polymyositis.   No need to pulse high dose steroids as patient is asymptomatic (no myalgia).   Will plan for left quadriceps muscle biopsy (weaker on exam and with no apparent atrophy).

## 2019-02-07 NOTE — PROGRESS NOTES
Assumed care of pt at 1900, pt able to ambulate to bed by self, steady gait, family at bedside, no c/o pain or other discomforts at that time. Meds given per MAR. Pt resting in bed now, call light in reach, no further needs at this time

## 2019-02-07 NOTE — PROGRESS NOTES
[facility] Medicine Daily Progress Note    Date of Service  2/7/2019    Chief Complaint  68 y.o. female admitted 2/6/2019 due to abnormal lab work at the PCP office (elevated CPK).    Hospital Course   This is a 68 years old female has past medical history of polymyalgia and poly-arthralgia with chronic rhabdomyolysis was recently admitted for generalized malaise and diffuse myalgias.  Was found to have rhabdomyolysis and was treated aggressively with fluids and steroids.  Symptoms improved.  Also lab work showed positive anti-CCP and negative rheumatoid factor and positive SHELIA.  There was a suspicion for rheumatoid arthritis and possible myositis.  She was referred to rheumatologist but her appointment is in July 2019.  On this admission she was sent from her PCP office after her blood work showed elevated CPK (in 3k).  However, patient has a symptomatic with no myalgias but was only feeling fatigued.  Unfortunately rheumatology does not provide inpatient services but outpatient only.  Interval Problem Update  Sitting up in chair comfortably.  Denies any myalgias.  Stated that she has been feeling tired for a while now.  Denies any arthralgia at this morning.  Stated that she mainly gets not right knee pain and that is attributed to an old injury since she was a child.  However, she mentioned that steroids has improved her symptoms in general but she is feeling fatigued since she started steroids.    Consultants/Specialty  None    Code Status  Full code    Disposition  Likely home    Review of Systems  Review of Systems   Constitutional: Positive for malaise/fatigue. Negative for chills and fever.   HENT: Negative for hearing loss and tinnitus.    Eyes: Negative for blurred vision and double vision.   Respiratory: Negative for cough, hemoptysis and sputum production.    Cardiovascular: Negative for chest pain, palpitations and orthopnea.   Gastrointestinal: Negative for heartburn, nausea and vomiting.    Genitourinary: Negative for dysuria, frequency and urgency.   Musculoskeletal: Positive for joint pain (Now controlled ). Negative for myalgias and neck pain.   Skin: Negative for rash.   Neurological: Negative for dizziness and headaches.   Psychiatric/Behavioral: Negative for depression, substance abuse and suicidal ideas.        Physical Exam  Temp:  [36.4 °C (97.6 °F)-36.9 °C (98.4 °F)] 36.8 °C (98.2 °F)  Pulse:  [] 90  Resp:  [16] 16  BP: (131-156)/(66-76) 131/72  SpO2:  [92 %-97 %] 93 %    Physical Exam   Constitutional: She is oriented to person, place, and time. She appears well-developed and well-nourished. No distress.   HENT:   Head: Normocephalic and atraumatic.   Mouth/Throat: No oropharyngeal exudate.   Eyes: Pupils are equal, round, and reactive to light. Conjunctivae are normal. No scleral icterus.   Neck: Normal range of motion. No tracheal deviation present. No thyromegaly present.   Cardiovascular: Normal rate and regular rhythm.  Exam reveals no gallop and no friction rub.    No murmur heard.  Pulmonary/Chest: Effort normal. No respiratory distress. She has no wheezes.   Abdominal: Soft. She exhibits no distension. There is no tenderness. There is no rebound.   Musculoskeletal: She exhibits no edema, tenderness or deformity.   Neurological: She is alert and oriented to person, place, and time.   Hip flexion 4.5 out of 5 on left and 5 out of 5 elsewhere   Skin: Skin is warm and dry. She is not diaphoretic.   Psychiatric: Her affect is blunt.       Fluids    Intake/Output Summary (Last 24 hours) at 02/07/19 1246  Last data filed at 02/06/19 2039   Gross per 24 hour   Intake             2360 ml   Output                0 ml   Net             2360 ml       Laboratory  Recent Labs      02/05/19   1144  02/06/19   1130  02/07/19   0315   WBC  20.9*  19.6*  13.4*   RBC  4.68  4.62  3.98*   HEMOGLOBIN  12.9  12.6  11.0*   HEMATOCRIT  41.0  39.7  33.7*   MCV  87.6  85.9  84.7   MCH  27.6  27.3   27.6   MCHC  31.5*  31.7*  32.6*   RDW  50.4*  49.5  48.8   PLATELETCT  482*  422  364   MPV  8.9*  8.5*  8.5*     Recent Labs      02/05/19   1144  02/06/19   1130  02/07/19   0316   SODIUM  133*  134*  134*   POTASSIUM  3.9  4.0  3.7   CHLORIDE  100  101  103   CO2  27  23  23   GLUCOSE  89  91  87   BUN  15  21  21   CREATININE  0.74  0.72  0.85   CALCIUM  9.0  8.8  8.3*                   Imaging  IR-NEEDLE BX-MUSCLE    (Results Pending)        Assessment/Plan  * Myositis of multiple sites- (present on admission)   Assessment & Plan    With elevated CPK, ESR, CRP, LFT's and LDH. No skin pathology. Suspecting polymyositis.   No need to pulse high dose steroids as patient is asymptomatic (no myalgia).   Will plan for left quadriceps muscle biopsy (weaker on exam and with no apparent atrophy).       Polyarthritis- (present on admission)   Assessment & Plan    Has polyarthralgia and polyarthritis suspecious for rheumatoid arthritis. Anti CCP was positive, RF negative.   Symptoms responded to steroids.   She has history of right knee injury since she was a child.  Unfortunately there is no inpatient rheumatolgy services and patient is scheduled to see one as outpatient in July. Will send mass referral hoping to get earlier appointment.   In the meantime, will hold steroids for now as patient is asymptomatic.    Will continue plaquenil.      Normocytic anemia- (present on admission)   Assessment & Plan    Monitor HH.      Non-traumatic rhabdomyolysis- (present on admission)   Assessment & Plan    CPK in the 3k.   Continue with aggressive hydration.   Kidney functions are stable.      Tobacco dependence- (present on admission)   Assessment & Plan    Patient was counseled.   Nicotine replacement available per protocol.        Leukocytosis- (present on admission)   Assessment & Plan    Likely due to chronic steroid use.  On hold for now.  Trending down.     Coronary artery disease involving native coronary artery of  native heart without angina pectoris- (present on admission)   Assessment & Plan    S/P PCI to RCA 2009.   Clinically stable.   Off statin due to rhabdomyalisis.   Patient is a good candidate for PCSK 9 medications.     Hyponatremia- (present on admission)   Assessment & Plan    It looks chronic.   Mild.  Continue to monitor.      Essential hypertension- (present on admission)   Assessment & Plan    Blood pressure stable  Continue home medication       Plan of care discussed with multidisciplinary team during rounds.    VTE prophylaxis: Lovenox

## 2019-02-07 NOTE — PROGRESS NOTES
Pt denies pain, tolerating diet with no n/v. Reports generalized fatigue and weakness. Fluids running as ordered. Discussed POC for day. Pending muscle biopsy.

## 2019-02-08 VITALS
BODY MASS INDEX: 29.12 KG/M2 | HEART RATE: 100 BPM | HEIGHT: 64 IN | TEMPERATURE: 98.5 F | SYSTOLIC BLOOD PRESSURE: 121 MMHG | WEIGHT: 170.6 LBS | OXYGEN SATURATION: 92 % | DIASTOLIC BLOOD PRESSURE: 68 MMHG | RESPIRATION RATE: 15 BRPM

## 2019-02-08 DIAGNOSIS — M60.852 MYOSITIS OF LEFT THIGH, UNSPECIFIED MYOSITIS TYPE: ICD-10-CM

## 2019-02-08 PROBLEM — E87.1 HYPONATREMIA: Status: RESOLVED | Noted: 2019-01-16 | Resolved: 2019-02-08

## 2019-02-08 LAB
ALBUMIN SERPL BCP-MCNC: 2.9 G/DL (ref 3.2–4.9)
ALBUMIN/GLOB SERPL: 0.9 G/DL
ALP SERPL-CCNC: 65 U/L (ref 30–99)
ALT SERPL-CCNC: 151 U/L (ref 2–50)
ANION GAP SERPL CALC-SCNC: 7 MMOL/L (ref 0–11.9)
AST SERPL-CCNC: 124 U/L (ref 12–45)
BASOPHILS # BLD AUTO: 0.7 % (ref 0–1.8)
BASOPHILS # BLD: 0.09 K/UL (ref 0–0.12)
BILIRUB SERPL-MCNC: 0.4 MG/DL (ref 0.1–1.5)
BUN SERPL-MCNC: 14 MG/DL (ref 8–22)
CALCIUM SERPL-MCNC: 8.2 MG/DL (ref 8.5–10.5)
CHLORIDE SERPL-SCNC: 105 MMOL/L (ref 96–112)
CK SERPL-CCNC: 3286 U/L (ref 0–154)
CO2 SERPL-SCNC: 24 MMOL/L (ref 20–33)
CREAT SERPL-MCNC: 0.68 MG/DL (ref 0.5–1.4)
EOSINOPHIL # BLD AUTO: 0.28 K/UL (ref 0–0.51)
EOSINOPHIL NFR BLD: 2.1 % (ref 0–6.9)
ERYTHROCYTE [DISTWIDTH] IN BLOOD BY AUTOMATED COUNT: 49.8 FL (ref 35.9–50)
GLOBULIN SER CALC-MCNC: 3.4 G/DL (ref 1.9–3.5)
GLUCOSE SERPL-MCNC: 85 MG/DL (ref 65–99)
HCT VFR BLD AUTO: 35.8 % (ref 37–47)
HGB BLD-MCNC: 11.5 G/DL (ref 12–16)
IMM GRANULOCYTES # BLD AUTO: 0.14 K/UL (ref 0–0.11)
IMM GRANULOCYTES NFR BLD AUTO: 1.1 % (ref 0–0.9)
LYMPHOCYTES # BLD AUTO: 1.81 K/UL (ref 1–4.8)
LYMPHOCYTES NFR BLD: 13.7 % (ref 22–41)
MCH RBC QN AUTO: 27.7 PG (ref 27–33)
MCHC RBC AUTO-ENTMCNC: 32.1 G/DL (ref 33.6–35)
MCV RBC AUTO: 86.3 FL (ref 81.4–97.8)
MONOCYTES # BLD AUTO: 1.06 K/UL (ref 0–0.85)
MONOCYTES NFR BLD AUTO: 8 % (ref 0–13.4)
NEUTROPHILS # BLD AUTO: 9.82 K/UL (ref 2–7.15)
NEUTROPHILS NFR BLD: 74.4 % (ref 44–72)
NRBC # BLD AUTO: 0 K/UL
NRBC BLD-RTO: 0 /100 WBC
PLATELET # BLD AUTO: 356 K/UL (ref 164–446)
PMV BLD AUTO: 8.7 FL (ref 9–12.9)
POTASSIUM SERPL-SCNC: 3.7 MMOL/L (ref 3.6–5.5)
PROT SERPL-MCNC: 6.3 G/DL (ref 6–8.2)
RBC # BLD AUTO: 4.15 M/UL (ref 4.2–5.4)
SODIUM SERPL-SCNC: 136 MMOL/L (ref 135–145)
WBC # BLD AUTO: 13.2 K/UL (ref 4.8–10.8)

## 2019-02-08 PROCEDURE — 700111 HCHG RX REV CODE 636 W/ 250 OVERRIDE (IP): Performed by: INTERNAL MEDICINE

## 2019-02-08 PROCEDURE — 82550 ASSAY OF CK (CPK): CPT

## 2019-02-08 PROCEDURE — 700102 HCHG RX REV CODE 250 W/ 637 OVERRIDE(OP): Performed by: FAMILY MEDICINE

## 2019-02-08 PROCEDURE — 700105 HCHG RX REV CODE 258: Performed by: FAMILY MEDICINE

## 2019-02-08 PROCEDURE — 700111 HCHG RX REV CODE 636 W/ 250 OVERRIDE (IP): Performed by: FAMILY MEDICINE

## 2019-02-08 PROCEDURE — 99239 HOSP IP/OBS DSCHRG MGMT >30: CPT | Performed by: FAMILY MEDICINE

## 2019-02-08 PROCEDURE — 36415 COLL VENOUS BLD VENIPUNCTURE: CPT

## 2019-02-08 PROCEDURE — 85025 COMPLETE CBC W/AUTO DIFF WBC: CPT

## 2019-02-08 PROCEDURE — A9270 NON-COVERED ITEM OR SERVICE: HCPCS | Performed by: FAMILY MEDICINE

## 2019-02-08 PROCEDURE — 80053 COMPREHEN METABOLIC PANEL: CPT

## 2019-02-08 PROCEDURE — 700102 HCHG RX REV CODE 250 W/ 637 OVERRIDE(OP): Performed by: INTERNAL MEDICINE

## 2019-02-08 PROCEDURE — A9270 NON-COVERED ITEM OR SERVICE: HCPCS | Performed by: INTERNAL MEDICINE

## 2019-02-08 RX ORDER — PREDNISONE 5 MG/1
5 TABLET ORAL DAILY
Qty: 30 TAB | Refills: 0 | Status: SHIPPED | OUTPATIENT
Start: 2019-02-09 | End: 2019-02-21

## 2019-02-08 RX ADMIN — HYDROXYCHLOROQUINE SULFATE 200 MG: 200 TABLET, FILM COATED ORAL at 04:58

## 2019-02-08 RX ADMIN — SODIUM CHLORIDE: 9 INJECTION, SOLUTION INTRAVENOUS at 10:48

## 2019-02-08 RX ADMIN — AMLODIPINE BESYLATE 10 MG: 10 TABLET ORAL at 04:58

## 2019-02-08 NOTE — PROGRESS NOTES
Patient alert/oriented x4,room air,care given,denies pain,call light and personal belongings within reach and bed in low position.

## 2019-02-08 NOTE — PROGRESS NOTES
Assumed care of patient, Patient is A&0x4. No c/o pain. States that she is tired, roommate was awake all night ordering things off TV. POC discussed, questions answered. Safety measures in place and call light within reach.

## 2019-02-08 NOTE — DISCHARGE INSTRUCTIONS
Discharge Instructions    Discharged to home by car with relative. Discharged via walking, hospital escort: Refused.  Special equipment needed: Not Applicable    Be sure to schedule a follow-up appointment with your primary care doctor or any specialists as instructed.     Discharge Plan:   Smoking Cessation Offered: Patient Refused  Pneumococcal Vaccine Administered/Refused: Not given - Patient refused pneumococcal vaccine  Influenza Vaccine Indication: Patient Refuses    I understand that a diet low in cholesterol, fat, and sodium is recommended for good health. Unless I have been given specific instructions below for another diet, I accept this instruction as my diet prescription.   Other diet: Regular    Special Instructions: None    · Is patient discharged on Warfarin / Coumadin?   No         Rhabdomyolysis  Rhabdomyolysis is a condition that happens when muscle cells break down and release substances into the blood that can damage the kidneys. This condition happens because of damage to the muscles that move bones (skeletal muscle). When the skeletal muscles are damaged, substances inside the muscle cells go into the blood. One of these substances is a protein called myoglobin.  Large amounts of myoglobin can cause kidney damage or kidney failure. Other substances that are released by muscle cells may upset the balance of the minerals (electrolytes) in your blood. This imbalance causes your blood to have too much acid (acidosis).  What are the causes?  This condition is caused by muscle damage. Muscle damage often happens because of:  · Using your muscles too much.  · An injury that crushes or squeezes a muscle too tightly.  · Using illegal drugs, mainly cocaine.  · Alcohol abuse.  Other possible causes include:  · Prescription medicines, such as those that:  ¨ Lower cholesterol (statins).  ¨ Treat ADHD (attention deficit hyperactivity disorder) or help with weight loss (amphetamines).  ¨ Treat pain  (opiates).  · Infections.  · Muscle diseases that are passed down from parent to child (inherited).  · High fever.  · Heatstroke.  · Not having enough fluids in your body (dehydration).  · Seizures.  · Surgery.  What increases the risk?  This condition is more likely to develop in people who:  · Have a family history of muscle disease.  · Take part in extreme sports, such as running in marathons.  · Have diabetes.  · Are older.  · Abuse drugs or alcohol.  What are the signs or symptoms?  Symptoms of this condition vary. Some people have very few symptoms, and other people have many symptoms. The most common symptoms include:  · Muscle pain and swelling.  · Weak muscles.  · Dark urine.  · Feeling weak and tired.  Other symptoms include:  · Nausea and vomiting.  · Fever.  · Pain in the abdomen.  · Pain in the joints.  Symptoms of complications from this condition include:  · Heart rhythm that is not normal (arrhythmia).  · Seizures.  · Not urinating enough because of kidney failure.  · Very low blood pressure (shock). Signs of shock include dizziness, blurry vision, and clammy skin.  · Bleeding that is hard to stop or control.  How is this diagnosed?  This condition is diagnosed based on your medical history, your symptoms, and a physical exam. Tests may also be done, including:  · Blood tests.  · Urine tests to check for myoglobin.  You may also have other tests to check for causes of muscle damage and to check for complications.  How is this treated?  Treatment for this condition helps to:  · Make sure you have enough fluids in your body.  · Lower the acid levels in your blood to reverse acidosis.  · Protect your kidneys.  Treatment may include:  · Fluids and medicines given through an IV tube that is inserted into one of your veins.  · Medicines to lower acidosis or to bring back the balance of the minerals in your body.  · Hemodialysis. This treatment uses an artificial kidney machine to filter your blood while  you recover. You may have this if other treatments are not helping.  Follow these instructions at home:  · Take over-the-counter and prescription medicines only as told by your health care provider.  · Rest at home until your health care provider says that you can return to your normal activities.  · Drink enough fluid to keep your urine clear or pale yellow.  · Do not do activities that take a lot of effort (are strenuous). Ask your health care provider what level of exercise is safe for you.  · Do not abuse drugs or alcohol. If you are having problems with drug or alcohol use, ask your health care provider for help.  · Keep all follow-up visits as told by your health care provider. This is important.  Contact a health care provider if:  · You start having symptoms of this condition after treatment.  Get help right away if:  · You have a seizure.  · You bleed easily or cannot control bleeding.  · You cannot urinate.  · You have chest pain.  · You have trouble breathing.  This information is not intended to replace advice given to you by your health care provider. Make sure you discuss any questions you have with your health care provider.  Document Released: 11/30/2005 Document Revised: 09/29/2017 Document Reviewed: 09/29/2017  [employer] Interactive Patient Education © 2017 [employer]            Depression / Suicide Risk    As you are discharged from this [facility] facility, it is important to learn how to keep safe from harming yourself.    Recognize the warning signs:  · Abrupt changes in personality, positive or negative- including increase in energy   · Giving away possessions  · Change in eating patterns- significant weight changes-  positive or negative  · Change in sleeping patterns- unable to sleep or sleeping all the time   · Unwillingness or inability to communicate  · Depression  · Unusual sadness, discouragement and loneliness  · Talk of wanting to die  · Neglect of personal  appearance   · Rebelliousness- reckless behavior  · Withdrawal from people/activities they love  · Confusion- inability to concentrate     If you or a loved one observes any of these behaviors or has concerns about self-harm, here's what you can do:  · Talk about it- your feelings and reasons for harming yourself  · Remove any means that you might use to hurt yourself (examples: pills, rope, extension cords, firearm)  · Get professional help from the community (Mental Health, Substance Abuse, psychological counseling)  · Do not be alone:Call your Safe Contact- someone whom you trust who will be there for you.  · Call your local CRISIS HOTLINE 434-9851 or 391-636-3970  · Call your local Children's Mobile Crisis Response Team [place] (988) 237-9262 or www.[employer]  · Call the toll free National Suicide Prevention Hotlines   · National Suicide Prevention Lifeline 574-065-IMIA (6243)  · National Hope Line Network 800-SUICIDE (200-2145)

## 2019-02-09 NOTE — DISCHARGE SUMMARY
Discharge Summary    CHIEF COMPLAINT ON ADMISSION  Chief Complaint   Patient presents with   • Sent by MD     abnormal labs        Reason for Admission  abnormal labs     Admission Date  2/6/2019    CODE STATUS  Prior    HPI & HOSPITAL COURSE  This is a 68 years old female has past medical history of polymyalgia and poly-arthralgia with chronic rhabdomyolysis was recently admitted for generalized malaise and diffuse myalgias.  Was found to have rhabdomyolysis and was treated aggressively with fluids and steroids.  Symptoms improved.  Also lab work showed positive anti-CCP and negative rheumatoid factor and positive SHELIA.  There was a suspicion for rheumatoid arthritis and possible myositis.  She was referred to rheumatologist but her appointment is in July 2019.  On this admission she was sent from her PCP office after her blood work showed elevated CPK (in 3k).  However, patient is asymptomatic with no myalgias but was only feeling fatigued.  The decision was made not to give any high-dose steroids.  Unfortunately rheumatology does not provide inpatient services but outpatient only.  Was started on high flow IV fluids.  On exam patient had mild left quadricep weakness on hip flexion with no atrophy noted.  Decision was made to get a muscle biopsy.  However, IR recommended outpatient muscle biopsy to be scheduled.  That was arranged through the schedule services.  CPK was still elevated (almost same as admission values) however, patient was asymptomatic.  Patient was able to ambulate with no assistant.  She was hemodynamically clinically stable.  Kidney functions were stable and normal during this hospital stay.  LFTs slightly trended down.  Inflammatory markers were elevated which again mild points towards inflammatory autoimmune process.  Patient stated the cardiologist advised patient against steroids due to history of coronary artery disease.  However, a low dose of prednisone was offered for patient in case of  myalgia or swelling of the extremities.  Patient was advised to hydrate herself well and avoid nephrotoxic medications.  Patient verbalized understanding.  On the day of discharge, I examined the patient and found her clinically stable.  Patient has no complaints to report.  She was medically cleared for discharge.      Therefore, she is discharged in fair and stable condition to home with close outpatient follow-up.    The patient met 2-midnight criteria for an inpatient stay at the time of discharge.    Discharge Date  2/8/2019    FOLLOW UP ITEMS POST DISCHARGE  Follow-up with PCP in 1 week.  Follow-up with rheumatology as per recommendations.  Follow up with IR for muscle biopsy as per recommendations.  Follow-up with cardiology as per recommendations.    DISCHARGE DIAGNOSES  Principal Problem:    Myositis of multiple sites POA: Yes  Active Problems:    Polyarthritis POA: Yes    Essential hypertension POA: Yes    Coronary artery disease involving native coronary artery of native heart without angina pectoris POA: Yes    Leukocytosis POA: Yes    Tobacco dependence POA: Yes    Non-traumatic rhabdomyolysis POA: Yes    Normocytic anemia POA: Yes  Resolved Problems:    Hyponatremia POA: Yes      FOLLOW UP  Future Appointments  Date Time Provider Department Center   2/21/2019 11:20 AM [facility]   2/28/2019 1:15 PM [facility] None   6/13/2019 11:00 AM [facility]     No follow-up provider specified.    MEDICATIONS ON DISCHARGE     Medication List      CHANGE how you take these medications      Instructions   predniSONE 5 MG Tabs  Start taking on:  2/9/2019  What changed:  · medication strength  · how much to take  Commonly known as:  DELTASONE   Take 1 Tab by mouth every day.  Dose:  5 mg        CONTINUE taking these medications      Instructions   amLODIPine 5 MG Tabs  Commonly known as:  NORVASC   Take 2 Tabs by mouth every  day.  Dose:  10 mg     aspirin 325 MG Tabs  Commonly known as:  ASA   Take 325 mg by mouth every evening.  Dose:  325 mg     hydroxychloroquine 200 MG Tabs  Commonly known as:  PLAQUENIL   Take 1 Tab by mouth every day.  Dose:  200 mg            Allergies  Allergies   Allergen Reactions   • Lisinopril Cough   • Statins [Hmg-Coa-R Inhibitors]        DIET  No orders of the defined types were placed in this encounter.      ACTIVITY  As tolerated.  Weight bearing as tolerated    CONSULTATIONS  None    PROCEDURES  None    LABORATORY  Lab Results   Component Value Date    SODIUM 136 02/08/2019    POTASSIUM 3.7 02/08/2019    CHLORIDE 105 02/08/2019    CO2 24 02/08/2019    GLUCOSE 85 02/08/2019    BUN 14 02/08/2019    CREATININE 0.68 02/08/2019        Lab Results   Component Value Date    WBC 13.2 (H) 02/08/2019    HEMOGLOBIN 11.5 (L) 02/08/2019    HEMATOCRIT 35.8 (L) 02/08/2019    PLATELETCT 356 02/08/2019        Total time of the discharge process exceeds 38 minutes.

## 2019-02-21 ENCOUNTER — NON-PROVIDER VISIT (OUTPATIENT)
Dept: URGENT CARE | Facility: PHYSICIAN GROUP | Age: 69
End: 2019-02-21
Payer: MEDICARE

## 2019-02-21 ENCOUNTER — APPOINTMENT (OUTPATIENT)
Dept: RADIOLOGY | Facility: IMAGING CENTER | Age: 69
End: 2019-02-21
Attending: NURSE PRACTITIONER
Payer: MEDICARE

## 2019-02-21 ENCOUNTER — OFFICE VISIT (OUTPATIENT)
Dept: MEDICAL GROUP | Facility: PHYSICIAN GROUP | Age: 69
End: 2019-02-21
Payer: MEDICARE

## 2019-02-21 VITALS
BODY MASS INDEX: 28.17 KG/M2 | WEIGHT: 165 LBS | OXYGEN SATURATION: 97 % | HEIGHT: 64 IN | DIASTOLIC BLOOD PRESSURE: 70 MMHG | TEMPERATURE: 99 F | HEART RATE: 107 BPM | RESPIRATION RATE: 20 BRPM | SYSTOLIC BLOOD PRESSURE: 114 MMHG

## 2019-02-21 DIAGNOSIS — J45.20 MILD INTERMITTENT ASTHMA WITHOUT COMPLICATION: ICD-10-CM

## 2019-02-21 DIAGNOSIS — R06.02 SHORTNESS OF BREATH: ICD-10-CM

## 2019-02-21 DIAGNOSIS — Z09 HOSPITAL DISCHARGE FOLLOW-UP: ICD-10-CM

## 2019-02-21 DIAGNOSIS — H93.8X1 SENSATION OF FULLNESS IN RIGHT EAR: ICD-10-CM

## 2019-02-21 DIAGNOSIS — G47.00 FREQUENT NOCTURNAL AWAKENING: ICD-10-CM

## 2019-02-21 PROCEDURE — 99214 OFFICE O/P EST MOD 30 MIN: CPT | Performed by: NURSE PRACTITIONER

## 2019-02-21 PROCEDURE — 71046 X-RAY EXAM CHEST 2 VIEWS: CPT | Mod: TC,FY | Performed by: NURSE PRACTITIONER

## 2019-02-21 RX ORDER — FLUTICASONE PROPIONATE 50 MCG
1 SPRAY, SUSPENSION (ML) NASAL DAILY
Qty: 16 G | Refills: 0 | Status: ON HOLD | OUTPATIENT
Start: 2019-02-21 | End: 2020-05-29

## 2019-02-21 RX ORDER — ALBUTEROL SULFATE 90 UG/1
2 AEROSOL, METERED RESPIRATORY (INHALATION) EVERY 4 HOURS PRN
Qty: 1 INHALER | Refills: 0 | Status: SHIPPED | OUTPATIENT
Start: 2019-02-21 | End: 2019-09-10

## 2019-02-21 ASSESSMENT — PAIN SCALES - GENERAL: PAINLEVEL: NO PAIN

## 2019-02-21 NOTE — PROGRESS NOTES
CC: Continued muscle weakness, fatigue, ear fullness, sleep disturbance    HISTORY OF THE PRESENT ILLNESS: Patient is a 68 y.o. female. This pleasant patient is here today, accompanied by , for evaluation management following health problems.      Hospital discharge follow-up  Patient was admitted to [facility] from 2/6/19 to 2/8/19 for continued elevated CPK, rhabdomyolysis, polymyalgia, polyarthralgia. Symptoms improved with fluids and steroids. Recent labs showed positive anti-CCP and negative rheumatoid factor and positive SHELIA.  There is a suspicion for rheumatoid arthritis and possible myositis.    They were unable to provide rheumatology consult while she was hospitalized.  Her initial appointment with rheumatology as scheduled for July.  Muscle biopsy was ordered while she was hospitalized and will be done tomorrow as an outpatient.  Patient was provided with a prescription of low-dose steroid in case she has myalgia or swelling of extremities.  Patient reports she has not had any myalgia or swelling.  Today patient reports extreme muscle weakness, weight loss, decreased appetite.  She reports it is taking all her energy to hold up her head.  She reports she cannot lift her arms over her head due to weakness.  She is extremely short of breath with ambulation.  She has been evaluated by cardiology.  Patient is taking Plaquenil 200 mg daily.    Patient's increase muscle weakness is very concerning.  I explained to patient that I will reach out to rheumatology and neurology for assistance with her symptoms.  Will make additional referrals pending muscle biopsy results.  ER precautions reviewed with patient and .    Sensation of fullness in right ear  Patient reports right ear popping, muffled hearing.  Onset 2 months ago.  Denies otalgia, sinus congestion, sore throat.  Upon exam today, no signs of infection, though cloudy fluid is behind tympanic membrane.  Will prescribe Flonase nasal  "spray.  Patient was also advised to try pseudoephedrine for 3 days.    Frequent nocturnal awakening  Patient reports frequent nocturnal awakening.  Onset a year ago, recently worsening.  Patient is wondering if she has sleep apnea.  She reports she snores, denies gasping.  Will refer to sleep studies for further evaluation.      Allergies: Lisinopril and Statins [hmg-coa-r inhibitors]    Current Outpatient Prescriptions Ordered in [facility]   Medication Sig Dispense Refill   • albuterol 108 (90 Base) MCG/ACT Aero Soln inhalation aerosol Inhale 2 Puffs by mouth every four hours as needed for Shortness of Breath. 1 Inhaler 0   • fluticasone (FLONASE) 50 MCG/ACT nasal spray Spray 1 Spray in nose every day. 16 g 0   • hydroxychloroquine (PLAQUENIL) 200 MG Tab Take 1 Tab by mouth every day. 30 Tab 1   • amLODIPine (NORVASC) 5 MG Tab Take 2 Tabs by mouth every day. 60 Tab 1   • aspirin (ASA) 325 MG TABS Take 325 mg by mouth every evening.       No current Epic-ordered facility-administered medications on file.        Past Medical History:   Diagnosis Date   • Heart attack (HCC)     2009    • Hypertension        Past Surgical History:   Procedure Laterality Date   • CYST EXCISION      right ankle, RA nodule    • OPEN REDUCTION     • STENT PLACEMENT      2009   • TUBAL COAGULATION LAPAROSCOPIC BILATERAL         Social History   Substance Use Topics   • Smoking status: Former Smoker     Packs/day: 0.50     Years: 35.00     Types: Cigarettes   • Smokeless tobacco: Never Used   • Alcohol use No       No family history on file.    ROS:   As in HPI, otherwise negative for chest pain, dyspnea, abdominal pain, dysuria, blood in stool, fever          Exam: Blood pressure 114/70, pulse (!) 107, temperature 37.2 °C (99 °F), temperature source Temporal, resp. rate 20, height 1.626 m (5' 4\"), weight 74.8 kg (165 lb), SpO2 97 %, not currently breastfeeding. Body mass index is 28.32 kg/m².    General: Alert, pleasant, fatigued looking, well " nourished, well developed female in NAD  HEENT: Normocephalic. Eyes conjunctiva clear lids without ptosis, pupils equal and reactive to light, ears normal shape and contour, canals are clear bilaterally, left tympanic membranes are pearly gray with good light reflex, right tympanic membrane with dull light reflex and fluid behind membrane, nasal mucosa without erythema and drainage, oropharynx is without erythema, edema or exudates.   Neck: Supple without bruit. Thyroid is not enlarged.  Pulmonary: Clear to ausculation.  Normal effort.  No rales, ronchi, or wheezing.   Cardiovascular: Normal rate and rhythm without murmur. Carotid and radial pulses are intact and equal bilaterally.  No lower extremity edema.  Abdomen: Soft, nontender, nondistended.   Neurologic: Grossly nonfocal  Lymph: No cervical or supraclavicular lymph nodes are palpable  Skin: Warm and dry.  No obvious lesions.  Musculoskeletal: Bilateral upper extremities with diminished strength 2/5 and equal bilaterally.    Psych: Normal mood and affect. Alert and oriented. Judgment and insight is normal.    Please note that this dictation was created using voice recognition software. I have made every reasonable attempt to correct obvious errors, but I expect that there are errors of grammar and possibly content that I did not discover before finalizing the note.      Assessment/Plan  1. Mild intermittent asthma without complication  Patient requesting refill  - albuterol 108 (90 Base) MCG/ACT Aero Soln inhalation aerosol; Inhale 2 Puffs by mouth every four hours as needed for Shortness of Breath.  Dispense: 1 Inhaler; Refill: 0    2. Sensation of fullness in right ear  Patient will trial Flonase nasal spray and 3-day trial of Sudafed.  - fluticasone (FLONASE) 50 MCG/ACT nasal spray; Spray 1 Spray in nose every day.  Dispense: 16 g; Refill: 0    3. Shortness of breath  We will get chest x-ray.  - DX-CHEST-2 VIEWS; Future    4. Hospital discharge  follow-up  Patient is scheduled for muscle biopsy tomorrow.    5. Frequent nocturnal awakening    - REFERRAL TO SLEEP STUDIES    Patient has previously scheduled appointment to return to clinic in June 2019.  Patient will return to clinic sooner pending muscle biopsy results.  Strict ER precautions reviewed with patient and .

## 2019-02-22 ENCOUNTER — APPOINTMENT (OUTPATIENT)
Dept: RADIOLOGY | Facility: MEDICAL CENTER | Age: 69
End: 2019-02-22
Attending: FAMILY MEDICINE
Payer: MEDICARE

## 2019-02-22 ENCOUNTER — HOSPITAL ENCOUNTER (OUTPATIENT)
Facility: MEDICAL CENTER | Age: 69
End: 2019-02-22
Attending: FAMILY MEDICINE | Admitting: FAMILY MEDICINE
Payer: MEDICARE

## 2019-02-22 VITALS
BODY MASS INDEX: 27.96 KG/M2 | HEART RATE: 90 BPM | DIASTOLIC BLOOD PRESSURE: 71 MMHG | RESPIRATION RATE: 17 BRPM | SYSTOLIC BLOOD PRESSURE: 118 MMHG | OXYGEN SATURATION: 95 % | TEMPERATURE: 98 F | HEIGHT: 64 IN | WEIGHT: 163.8 LBS

## 2019-02-22 DIAGNOSIS — M60.852 MYOSITIS OF LEFT THIGH, UNSPECIFIED MYOSITIS TYPE: ICD-10-CM

## 2019-02-22 PROBLEM — M62.82 RHABDOMYOLYSIS DUE TO STATIN THERAPY: Status: RESOLVED | Noted: 2019-01-16 | Resolved: 2019-02-22

## 2019-02-22 PROBLEM — H93.8X1 SENSATION OF FULLNESS IN RIGHT EAR: Status: ACTIVE | Noted: 2019-02-22

## 2019-02-22 PROBLEM — G47.00 FREQUENT NOCTURNAL AWAKENING: Status: ACTIVE | Noted: 2019-02-22

## 2019-02-22 PROBLEM — T46.6X5A RHABDOMYOLYSIS DUE TO STATIN THERAPY: Status: RESOLVED | Noted: 2019-01-16 | Resolved: 2019-02-22

## 2019-02-22 LAB
INR PPP: 1.11 (ref 0.87–1.13)
PATHOLOGY CONSULT NOTE: NORMAL
PROTHROMBIN TIME: 14.4 SEC (ref 12–14.6)

## 2019-02-22 PROCEDURE — 160002 HCHG RECOVERY MINUTES (STAT)

## 2019-02-22 PROCEDURE — 700101 HCHG RX REV CODE 250

## 2019-02-22 PROCEDURE — 88305 TISSUE EXAM BY PATHOLOGIST: CPT | Mod: XU

## 2019-02-22 PROCEDURE — 77012 CT SCAN FOR NEEDLE BIOPSY: CPT | Mod: LT

## 2019-02-22 PROCEDURE — 88323 CONSLTJ&REPRT MATRL PREP SLD: CPT

## 2019-02-22 PROCEDURE — 85610 PROTHROMBIN TIME: CPT

## 2019-02-22 PROCEDURE — 700111 HCHG RX REV CODE 636 W/ 250 OVERRIDE (IP)

## 2019-02-22 PROCEDURE — 88313 SPECIAL STAINS GROUP 2: CPT | Mod: XU

## 2019-02-22 RX ORDER — HYDROMORPHONE HYDROCHLORIDE 2 MG/ML
0.5 INJECTION, SOLUTION INTRAMUSCULAR; INTRAVENOUS; SUBCUTANEOUS
Status: DISCONTINUED | OUTPATIENT
Start: 2019-02-22 | End: 2019-02-22 | Stop reason: HOSPADM

## 2019-02-22 RX ORDER — OXYCODONE HYDROCHLORIDE 10 MG/1
10 TABLET ORAL
Status: DISCONTINUED | OUTPATIENT
Start: 2019-02-22 | End: 2019-02-22 | Stop reason: HOSPADM

## 2019-02-22 RX ORDER — ONDANSETRON 2 MG/ML
4 INJECTION INTRAMUSCULAR; INTRAVENOUS EVERY 8 HOURS PRN
Status: DISCONTINUED | OUTPATIENT
Start: 2019-02-22 | End: 2019-02-22 | Stop reason: HOSPADM

## 2019-02-22 RX ORDER — NALOXONE HYDROCHLORIDE 0.4 MG/ML
INJECTION, SOLUTION INTRAMUSCULAR; INTRAVENOUS; SUBCUTANEOUS
Status: COMPLETED
Start: 2019-02-22 | End: 2019-02-22

## 2019-02-22 RX ORDER — OXYCODONE HYDROCHLORIDE 5 MG/1
5 TABLET ORAL
Status: DISCONTINUED | OUTPATIENT
Start: 2019-02-22 | End: 2019-02-22 | Stop reason: HOSPADM

## 2019-02-22 RX ORDER — ONDANSETRON 2 MG/ML
4 INJECTION INTRAMUSCULAR; INTRAVENOUS PRN
Status: DISCONTINUED | OUTPATIENT
Start: 2019-02-22 | End: 2019-02-22 | Stop reason: HOSPADM

## 2019-02-22 RX ORDER — MIDAZOLAM HYDROCHLORIDE 1 MG/ML
INJECTION INTRAMUSCULAR; INTRAVENOUS
Status: COMPLETED
Start: 2019-02-22 | End: 2019-02-22

## 2019-02-22 RX ORDER — SODIUM CHLORIDE 9 MG/ML
500 INJECTION, SOLUTION INTRAVENOUS
Status: DISCONTINUED | OUTPATIENT
Start: 2019-02-22 | End: 2019-02-22 | Stop reason: HOSPADM

## 2019-02-22 RX ORDER — LIDOCAINE HYDROCHLORIDE 10 MG/ML
INJECTION, SOLUTION INFILTRATION; PERINEURAL
Status: COMPLETED
Start: 2019-02-22 | End: 2019-02-22

## 2019-02-22 RX ORDER — MIDAZOLAM HYDROCHLORIDE 1 MG/ML
.5-2 INJECTION INTRAMUSCULAR; INTRAVENOUS PRN
Status: DISCONTINUED | OUTPATIENT
Start: 2019-02-22 | End: 2019-02-22 | Stop reason: HOSPADM

## 2019-02-22 RX ADMIN — MIDAZOLAM HYDROCHLORIDE 1 MG: 1 INJECTION INTRAMUSCULAR; INTRAVENOUS at 14:08

## 2019-02-22 RX ADMIN — MIDAZOLAM 1 MG: 1 INJECTION INTRAMUSCULAR; INTRAVENOUS at 14:08

## 2019-02-22 RX ADMIN — FENTANYL CITRATE 50 MCG: 50 INJECTION, SOLUTION INTRAMUSCULAR; INTRAVENOUS at 14:08

## 2019-02-22 RX ADMIN — MIDAZOLAM HYDROCHLORIDE 1 MG: 1 INJECTION INTRAMUSCULAR; INTRAVENOUS at 14:11

## 2019-02-22 RX ADMIN — LIDOCAINE HYDROCHLORIDE: 10 INJECTION, SOLUTION INFILTRATION; PERINEURAL at 13:00

## 2019-02-22 NOTE — ASSESSMENT & PLAN NOTE
Patient was admitted to [facility] from 2/6/19 to 2/8/19 for continued elevated CPK, rhabdomyolysis, polymyalgia, polyarthralgia. Symptoms improved with fluids and steroids. Recent labs showed positive anti-CCP and negative rheumatoid factor and positive SHELIA.  There is a suspicion for rheumatoid arthritis and possible myositis.    They were unable to provide rheumatology consult while she was hospitalized.  Her initial appointment with rheumatology as scheduled for July.  Muscle biopsy was ordered while she was hospitalized and will be done tomorrow as an outpatient.  Patient was provided with a prescription of low-dose steroid in case she has myalgia or swelling of extremities.  Patient reports she has not had any myalgia or swelling.  Today patient reports extreme muscle weakness, weight loss, decreased appetite.  She reports it is taking all her energy to hold up her head.  She reports she cannot lift her arms over her head due to weakness.  She is extremely short of breath with ambulation.  She has been evaluated by cardiology.  Patient is taking Plaquenil 200 mg daily.    Patient's increase muscle weakness is very concerning.  I explained to patient that I will reach out to rheumatology and neurology for assistance with her symptoms.  Will make additional referrals pending muscle biopsy results.  ER precautions reviewed with patient and .

## 2019-02-22 NOTE — ASSESSMENT & PLAN NOTE
Patient reports right ear popping, muffled hearing.  Onset 2 months ago.  Denies otalgia, sinus congestion, sore throat.  Upon exam today, no signs of infection, though cloudy fluid is behind tympanic membrane.  Will prescribe Flonase nasal spray.  Patient was also advised to try pseudoephedrine for 3 days.

## 2019-02-22 NOTE — ASSESSMENT & PLAN NOTE
Patient reports frequent nocturnal awakening.  Onset a year ago, recently worsening.  Patient is wondering if she has sleep apnea.  She reports she snores, denies gasping.  Will refer to sleep studies for further evaluation.

## 2019-02-22 NOTE — DISCHARGE INSTRUCTIONS
ACTIVITY: Rest and take it easy for the first 24 hours.  A responsible adult is recommended to remain with you during that time.  It is normal to feel sleepy.  We encourage you to not do anything that requires balance, judgment or coordination.    MILD FLU-LIKE SYMPTOMS ARE NORMAL. YOU MAY EXPERIENCE GENERALIZED MUSCLE ACHES, THROAT IRRITATION, HEADACHE AND/OR SOME NAUSEA.    FOR 24 HOURS DO NOT:  Drive, operate machinery or run household appliances.  Drink beer or alcoholic beverages.   Make important decisions or sign legal documents.    SPECIAL INSTRUCTIONS: *KEEP INCISION DRY FOR 24 HRS**    DIET: To avoid nausea, slowly advance diet as tolerated, avoiding spicy or greasy foods for the first day.  Add more substantial food to your diet according to your physician's instructions.  Babies can be fed formula or breast milk as soon as they are hungry.  INCREASE FLUIDS AND FIBER TO AVOID CONSTIPATION.    SURGICAL DRESSING/BATHING: *MAY SHOWER TOMORROW AFTERNOON THEN MAY REMOVE DRESSING**    FOLLOW-UP APPOINTMENT:  A follow-up appointment should be arranged with your doctor in *[place]   [phone]  **; call to schedule.    You should CALL YOUR PHYSICIAN if you develop:  Fever greater than 101 degrees F.  Pain not relieved by medication, or persistent nausea or vomiting.  Excessive bleeding (blood soaking through dressing) or unexpected drainage from the wound.  Extreme redness or swelling around the incision site, drainage of pus or foul smelling drainage.  Inability to urinate or empty your bladder within 8 hours.  Problems with breathing or chest pain.    You should call 911 if you develop problems with breathing or chest pain.  If you are unable to contact your doctor or surgical center, you should go to the nearest emergency room or urgent care center.  Physician's telephone #: **[phone]*    If any questions arise, call your doctor.  If your doctor is not available, please feel free to call the Surgical  Center at [phone]  The Center is open Monday through Friday from 7AM to 7PM.  You can also call the HEALTH HOTLINE open 24 hours/day, 7 days/week and speak to a nurse at [phone], or toll free at [phone].    A registered nurse may call you a few days after your surgery to see how you are doing after your procedure.    MEDICATIONS: Resume taking daily medication.  Take prescribed pain medication with food.  If no medication is prescribed, you may take non-aspirin pain medication if needed.  PAIN MEDICATION CAN BE VERY CONSTIPATING.  Take a stool softener or laxative such as senokot, pericolace, or milk of magnesia if needed.      If your physician has prescribed pain medication that includes Acetaminophen (Tylenol), do not take additional Acetaminophen (Tylenol) while taking the prescribed medication.    Depression / Suicide Risk    As you are discharged from this [facility] facility, it is important to learn how to keep safe from harming yourself.    Recognize the warning signs:  · Abrupt changes in personality, positive or negative- including increase in energy   · Giving away possessions  · Change in eating patterns- significant weight changes-  positive or negative  · Change in sleeping patterns- unable to sleep or sleeping all the time   · Unwillingness or inability to communicate  · Depression  · Unusual sadness, discouragement and loneliness  · Talk of wanting to die  · Neglect of personal appearance   · Rebelliousness- reckless behavior  · Withdrawal from people/activities they love  · Confusion- inability to concentrate     If you or a loved one observes any of these behaviors or has concerns about self-harm, here's what you can do:  · Talk about it- your feelings and reasons for harming yourself  · Remove any means that you might use to hurt yourself (examples: pills, rope, extension cords, firearm)  · Get professional help from the community (Mental Health, Substance Abuse,  psychological counseling)  · Do not be alone:Call your Safe Contact- someone whom you trust who will be there for you.  · Call your local CRISIS HOTLINE 335-3538 or 270-242-2233  · Call your local Children's Mobile Crisis Response Team [place] (038) 030-4378 or www.[employer]  · Call the toll free National Suicide Prevention Hotlines   · National Suicide Prevention Lifeline 635-534-KXNC (1357)  · National Hope Line Network 800-SUICIDE (124-4493)

## 2019-02-22 NOTE — OR SURGEON
Immediate Post- Operative Note        PostOp Diagnosis: RHABDOMYOLYSIS      Procedure(s): CT BX OF ANTERIOR THIGH MUSCLE      Estimated Blood Loss: Less than 5 ml        Complications: None            2/22/2019     2:16 PM     Jcarlos Roy

## 2019-02-22 NOTE — PROGRESS NOTES
CT Nursing Note:    Patient consented by Dr. Roy, all questions answered. Dr. Roy performed left thigh muscle biopsy.  Cores x 3 taken by Hernando HAYNES.  The pt tolerated the procedure well; ETCo2 baseline 30, with consistent waveform during the procedure.  Gauze and tegaderm applied to left thigh, CDI and soft.  Pt alert  and verbally appropriate post procedure, vital signs stable during procedure  and transport, see flow sheet for vital signs.  Report given to ALMA Schafer.  RN transported pt to [facility] with Sao2 monitor.

## 2019-02-23 NOTE — OR NURSING
1435    PATIENT RECEIVED FROM IR, S/P LEFT THIGH MUSCLE BX.  BX SITE WITH 2X2 AND TEGADERM INTACT.  DENIES ANY PAIN.  NO FAMILY IS @ BEDSIDE.      1500   PATIENT IS DROWSY BUT AROUSABLE.      1530   PATIENT WAKING UP MORE,  ABLE TO TAKE PO FLUID AND SNACK OK.    1645   AWAITING FOR  TO .  DC INSTRUCTIONS GIVEN TO PATIENT.  VERBALIZED UNDERSTANDING OF ALL.  HL DC.  PATIENT DC TO HOME,  VIA WHEELCHAIR,  ESCORTED OUT BY CNA.

## 2019-02-28 ENCOUNTER — TELEPHONE (OUTPATIENT)
Dept: MEDICAL GROUP | Facility: PHYSICIAN GROUP | Age: 69
End: 2019-02-28

## 2019-02-28 ENCOUNTER — OFFICE VISIT (OUTPATIENT)
Dept: CARDIOLOGY | Facility: MEDICAL CENTER | Age: 69
End: 2019-02-28
Payer: MEDICARE

## 2019-02-28 VITALS
BODY MASS INDEX: 27.93 KG/M2 | OXYGEN SATURATION: 94 % | HEIGHT: 64 IN | SYSTOLIC BLOOD PRESSURE: 122 MMHG | HEART RATE: 98 BPM | WEIGHT: 163.6 LBS | DIASTOLIC BLOOD PRESSURE: 66 MMHG

## 2019-02-28 DIAGNOSIS — R53.1 GENERALIZED WEAKNESS: ICD-10-CM

## 2019-02-28 DIAGNOSIS — E78.2 MIXED HYPERLIPIDEMIA: ICD-10-CM

## 2019-02-28 DIAGNOSIS — R00.2 PALPITATIONS: ICD-10-CM

## 2019-02-28 DIAGNOSIS — I10 ESSENTIAL HYPERTENSION: ICD-10-CM

## 2019-02-28 DIAGNOSIS — M62.82 NON-TRAUMATIC RHABDOMYOLYSIS: ICD-10-CM

## 2019-02-28 DIAGNOSIS — Z09 HOSPITAL DISCHARGE FOLLOW-UP: ICD-10-CM

## 2019-02-28 DIAGNOSIS — R09.89 LEFT CAROTID BRUIT: ICD-10-CM

## 2019-02-28 DIAGNOSIS — R09.89 BRUIT OF LEFT CAROTID ARTERY: ICD-10-CM

## 2019-02-28 DIAGNOSIS — I25.10 CORONARY ARTERY DISEASE INVOLVING NATIVE CORONARY ARTERY OF NATIVE HEART WITHOUT ANGINA PECTORIS: ICD-10-CM

## 2019-02-28 PROCEDURE — 99214 OFFICE O/P EST MOD 30 MIN: CPT | Performed by: NURSE PRACTITIONER

## 2019-02-28 ASSESSMENT — ENCOUNTER SYMPTOMS
MYALGIAS: 0
NERVOUS/ANXIOUS: 1
DIZZINESS: 0
WEIGHT LOSS: 0
ORTHOPNEA: 0
WEAKNESS: 1
SHORTNESS OF BREATH: 0
PALPITATIONS: 1
PND: 0
DEPRESSION: 1
CLAUDICATION: 0

## 2019-02-28 NOTE — TELEPHONE ENCOUNTER
Phone Number Called: [phone]    Message: Follow up: Eloy Chilel's MA needed patients Rheumatology Referral information for   Rheumatology   [facility]   [address] [facility] [place] [id number] [phone]   They need to contact his office for some additional information.    Left Message for patient to call back: no

## 2019-02-28 NOTE — PROGRESS NOTES
Chief Complaint   Patient presents with   • Follow-Up     30 day from last visit per Dr. Berman        Subjective:   Vane Ritter is a 68 y.o. female who presents today for follow-up regarding her CAD.    Patient was seen for initial consultation by Dr. Lon Berman on 1/29/19, no further evaluation of CAD was recommended at that time due to recent normal echocardiogram and EKG.    Past medical history significant for CAD s/p PCI to the mid RCA in March 2009, rhabdomyolysis in January 2018 with subsequent positive anti--CCP and SHELIA with negative rheumatoid factor raising concern for possible myositis and rheumatoid arthritis (patient referred to Rheumatology, appointment is in July 2019), HLD and HTN.     Patient was recently admitted after being referred to the ER by her PCP for an elevated CPK of 2,895. Patient was given IVF and discharged with outpatient muscle biopsy arranged.     Today patient states that she is not feeling well. She continues to have significant generalized weakness, telling me that it is difficult to walk any significant distance or even  a coffee cup and that she has to pull on her pant leg to help her lift her leg to get into the car.  She had her muscle biopsy completed on 2/22/19 and has not received the results yet. She tells me that she is very frustrated due to the lack of answers she is receiving and not having a diagnosis.     Additionally, patient tells me that she has been experiencing palpitations. These palpitations occur approximately 2-3 X a week and are very uncomfortable, she states that these palpitations are associated with shortness of breath. She denies chest pain, lower extremity edema or syncope.     Past Medical History:   Diagnosis Date   • Heart attack (HCC)     2009    • Hypertension      Past Surgical History:   Procedure Laterality Date   • CYST EXCISION      right ankle, RA nodule    • OPEN REDUCTION     • STENT PLACEMENT      2009   •  TUBAL COAGULATION LAPAROSCOPIC BILATERAL       History reviewed. No pertinent family history.  Social History     Social History   • Marital status:      Spouse name: N/A   • Number of children: N/A   • Years of education: N/A     Occupational History   • Not on file.     Social History Main Topics   • Smoking status: Former Smoker     Packs/day: 0.50     Years: 35.00     Types: Cigarettes   • Smokeless tobacco: Never Used   • Alcohol use No   • Drug use: No   • Sexual activity: Not Currently     Partners: Male     Other Topics Concern   • Not on file     Social History Narrative   • No narrative on file     Allergies   Allergen Reactions   • Lisinopril Cough   • Statins [Hmg-Coa-R Inhibitors]      Outpatient Encounter Prescriptions as of 2/28/2019   Medication Sig Dispense Refill   • aspirin 81 MG tablet Take 1 Tab by mouth every evening. 30 Tab 11   • albuterol 108 (90 Base) MCG/ACT Aero Soln inhalation aerosol Inhale 2 Puffs by mouth every four hours as needed for Shortness of Breath. 1 Inhaler 0   • fluticasone (FLONASE) 50 MCG/ACT nasal spray Spray 1 Spray in nose every day. 16 g 0   • hydroxychloroquine (PLAQUENIL) 200 MG Tab Take 1 Tab by mouth every day. 30 Tab 1   • amLODIPine (NORVASC) 5 MG Tab Take 2 Tabs by mouth every day. 60 Tab 1   • [DISCONTINUED] aspirin (ASA) 325 MG TABS Take 325 mg by mouth every evening.       No facility-administered encounter medications on file as of 2/28/2019.      Review of Systems   Constitutional: Negative for malaise/fatigue and weight loss.   Respiratory: Negative for shortness of breath.    Cardiovascular: Positive for palpitations. Negative for chest pain, orthopnea, claudication, leg swelling and PND.   Musculoskeletal: Negative for myalgias.   Neurological: Positive for weakness (Generalized). Negative for dizziness.   Psychiatric/Behavioral: Positive for depression. The patient is nervous/anxious.    All other systems reviewed and are negative.        "Objective:   /66 (BP Location: Right arm, Patient Position: Sitting)   Pulse 98   Ht 1.626 m (5' 4\")   Wt 74.2 kg (163 lb 9.6 oz)   SpO2 94%   BMI 28.08 kg/m²     Physical Exam   Constitutional: She is oriented to person, place, and time. She appears well-developed and well-nourished. No distress.   HENT:   Head: Normocephalic.   Eyes: EOM are normal.   Neck: No JVD present.   Cardiovascular: Normal rate, regular rhythm, normal heart sounds and intact distal pulses.    Left carotid bruit   Pulmonary/Chest: Breath sounds normal. No respiratory distress.   Abdominal: Soft. There is no tenderness.   Musculoskeletal: She exhibits no edema.   Neurological: She is alert and oriented to person, place, and time.   Skin: Skin is warm and dry.   Psychiatric: She has a normal mood and affect. Her behavior is normal.       1/19/19:  CONCLUSIONS  No prior study is available for comparison.   Normal left ventricular chamber size.  Mild concentric left ventricular hypertrophy.  Normal left ventricular systolic function.    Left Ventricle  Normal left ventricular chamber size. Mild concentric left ventricular hypertrophy. Normal left ventricular systolic function. Normal diastolic function.    2/7/19:  Sodium 136  135 - 145 mmol/L Final   Potassium 3.7  3.6 - 5.5 mmol/L Final   Chloride 105  96 - 112 mmol/L Final   Co2 24  20 - 33 mmol/L Final   Glucose 85  65 - 99 mg/dL Final   Bun 14  8 - 22 mg/dL Final   Creatinine 0.68  0.50 - 1.40 mg/dL Final   Calcium 8.2   8.5 - 10.5 mg/dL Final   Anion Gap 7.0  0.0 - 11.9 Final   AST(SGOT) 124   12 - 45 U/L Final   ALT(SGPT) 151   2 - 50 U/L Final   Alkaline Phosphatase 65  30 - 99 U/L Final   Total Bilirubin 0.4  0.1 - 1.5 mg/dL Final   Albumin 2.9   3.2 - 4.9 g/dL Final   Total Protein 6.3  6.0 - 8.2 g/dL Final   Globulin 3.4  1.9 - 3.5 g/dL Final   A-G Ratio 0.9  g/dL Final     GFR If African American >60  >60 mL/min/1.73 m 2 Final   GFR If Non  >60  >60 " mL/min/1.73 m 2 Final     6/14/18:  Cholesterol,Tot 223   100 - 199 mg/dL Final   Triglycerides 209   0 - 149 mg/dL Final   HDL 33   >=40 mg/dL Final      <100 mg/dL Final           Assessment:     1. Bruit of left carotid artery  US-CAROTID DOPPLER BILAT   2. Coronary artery disease involving native coronary artery of native heart without angina pectoris     3. Mixed hyperlipidemia  US-CAROTID DOPPLER BILAT   4. Palpitations  [facility]    CBC, NO DIFFERENTIAL/PLATELET (HEMOGRAM)    Comp Metabolic Panel   5. Essential hypertension     6. Hospital discharge follow-up     7. Non-traumatic rhabdomyolysis     8. Generalized weakness     9. Left carotid bruit         Medical Decision Making:  Today's Assessment / Status / Plan:     CAD:  -S/P PCI to RCA in 2009.  -TTE on 1/19/19 showing normal LV systolic function, LVEF 65%.  -ASA reduced to 81 mg daily.     Palpitations:  -14 day event monitor ordered.     HTN:  -Stable.  -Mild LVH observed on TTE.  -Continue Norvasc 10 mg daily.     HLD:  -Elevated cholesterol panel.   -LFT's improving, still elevated.   -PCSK 9 recommended by Dr. Berman once abnormal LFT's resolve.   -CMP    Rhabdo/Generalized Weakness:  -Muscle Biopsy completed on 2/22/19, results pending.   -Referral to rheumatologist mark pollard in July.   -Last CPK on 2/8/19 was 3286.   -CBC.     Carotid bruit:  -Left carotid bruit auscultated on exam today.  -Bilateral carotid ultrasound ordered.    Patient will follow up with Dr. Irwin gallardo on 3/21/19 or earlier if needed with event monitor and lab work completed prior.  Encourage patient to contact office should any questions or concerns arise in the meantime.  Patient and  understand and agree with plan of care.    Future Appointments  Date Time Provider Department Center   3/14/2019 1:45 PM [facility] EXAM 8 [facility]   3/19/2019 10:20 AM FUAD [facility]   3/21/2019 1:00 PM Jcarlos Parrish M.D.  [facility] None   6/13/2019 11:00 AM [facility]       Collaborating Provider: Dr. Nona Myers

## 2019-03-01 ENCOUNTER — HOSPITAL ENCOUNTER (OUTPATIENT)
Dept: LAB | Facility: MEDICAL CENTER | Age: 69
End: 2019-03-01
Attending: NURSE PRACTITIONER
Payer: MEDICARE

## 2019-03-01 DIAGNOSIS — R00.2 PALPITATIONS: ICD-10-CM

## 2019-03-01 LAB
ALBUMIN SERPL BCP-MCNC: 3.4 G/DL (ref 3.2–4.9)
ALBUMIN/GLOB SERPL: 1 G/DL
ALP SERPL-CCNC: 82 U/L (ref 30–99)
ALT SERPL-CCNC: 132 U/L (ref 2–50)
ANION GAP SERPL CALC-SCNC: 10 MMOL/L (ref 0–11.9)
AST SERPL-CCNC: 98 U/L (ref 12–45)
BILIRUB SERPL-MCNC: 0.4 MG/DL (ref 0.1–1.5)
BUN SERPL-MCNC: 15 MG/DL (ref 8–22)
CALCIUM SERPL-MCNC: 9.5 MG/DL (ref 8.5–10.5)
CHLORIDE SERPL-SCNC: 98 MMOL/L (ref 96–112)
CO2 SERPL-SCNC: 23 MMOL/L (ref 20–33)
CREAT SERPL-MCNC: 0.67 MG/DL (ref 0.5–1.4)
ERYTHROCYTE [DISTWIDTH] IN BLOOD BY AUTOMATED COUNT: 46.2 FL (ref 35.9–50)
GLOBULIN SER CALC-MCNC: 3.4 G/DL (ref 1.9–3.5)
GLUCOSE SERPL-MCNC: 103 MG/DL (ref 65–99)
HCT VFR BLD AUTO: 38.2 % (ref 37–47)
HGB BLD-MCNC: 12.3 G/DL (ref 12–16)
MCH RBC QN AUTO: 27.2 PG (ref 27–33)
MCHC RBC AUTO-ENTMCNC: 32.2 G/DL (ref 33.6–35)
MCV RBC AUTO: 84.3 FL (ref 81.4–97.8)
PLATELET # BLD AUTO: 491 K/UL (ref 164–446)
PMV BLD AUTO: 9 FL (ref 9–12.9)
POTASSIUM SERPL-SCNC: 4.1 MMOL/L (ref 3.6–5.5)
PROT SERPL-MCNC: 6.8 G/DL (ref 6–8.2)
RBC # BLD AUTO: 4.53 M/UL (ref 4.2–5.4)
SODIUM SERPL-SCNC: 131 MMOL/L (ref 135–145)
WBC # BLD AUTO: 11.9 K/UL (ref 4.8–10.8)

## 2019-03-01 PROCEDURE — 80053 COMPREHEN METABOLIC PANEL: CPT

## 2019-03-01 PROCEDURE — 85027 COMPLETE CBC AUTOMATED: CPT

## 2019-03-01 PROCEDURE — 36415 COLL VENOUS BLD VENIPUNCTURE: CPT

## 2019-03-04 ENCOUNTER — TELEPHONE (OUTPATIENT)
Dept: CARDIOLOGY | Facility: MEDICAL CENTER | Age: 69
End: 2019-03-04

## 2019-03-04 NOTE — TELEPHONE ENCOUNTER
KUNAL Lira.  Ely Warren, R.N.             Please let patient know that her liver enzymes are continuing to decrease which is good. Sodium is a little low, could try adding a little salt to her diet. CBC showing continuing decrease of her WBC, platelet up slightly. FU with PCP as scheduled on 3/14/19 and TF on 3/21/19.      Spoke w/pt regarding above. She verbalized understanding, repeated appts back to me.

## 2019-03-07 ENCOUNTER — HOSPITAL ENCOUNTER (OUTPATIENT)
Dept: LAB | Facility: MEDICAL CENTER | Age: 69
End: 2019-03-07
Attending: PHYSICIAN ASSISTANT
Payer: MEDICARE

## 2019-03-07 ENCOUNTER — OFFICE VISIT (OUTPATIENT)
Dept: URGENT CARE | Facility: PHYSICIAN GROUP | Age: 69
End: 2019-03-07
Payer: MEDICARE

## 2019-03-07 VITALS
DIASTOLIC BLOOD PRESSURE: 88 MMHG | HEART RATE: 93 BPM | TEMPERATURE: 97.5 F | SYSTOLIC BLOOD PRESSURE: 146 MMHG | OXYGEN SATURATION: 93 % | RESPIRATION RATE: 22 BRPM

## 2019-03-07 DIAGNOSIS — R21 RASH: ICD-10-CM

## 2019-03-07 DIAGNOSIS — R53.83 OTHER FATIGUE: ICD-10-CM

## 2019-03-07 LAB — CK SERPL-CCNC: 2747 U/L (ref 0–154)

## 2019-03-07 PROCEDURE — 82550 ASSAY OF CK (CPK): CPT

## 2019-03-07 PROCEDURE — 99214 OFFICE O/P EST MOD 30 MIN: CPT | Performed by: PHYSICIAN ASSISTANT

## 2019-03-07 PROCEDURE — 36415 COLL VENOUS BLD VENIPUNCTURE: CPT

## 2019-03-07 RX ORDER — PREDNISONE 10 MG/1
TABLET ORAL
Qty: 1 QUANTITY SUFFICIENT | Refills: 0 | Status: SHIPPED | OUTPATIENT
Start: 2019-03-07 | End: 2021-04-25

## 2019-03-07 RX ORDER — CHLORAL HYDRATE 500 MG
CAPSULE ORAL
COMMUNITY
End: 2020-05-19

## 2019-03-07 NOTE — PROGRESS NOTES
Chief Complaint   Patient presents with   • Rash     full body       HISTORY OF PRESENT ILLNESS: Patient is a 68 y.o. female who presents today for the following:    Patient comes in for evaluation of a rash that started 3 days ago.  Patient complains of severe itching but denies pain.  She denies any new exposures recently other than hydroxychloroquine and amlodipine, both started mid January, 6-8 weeks ago.  Patient denies any facial swelling.  She has had shortness of breath chronically since her discharge from the hospital after being diagnosed with rhabdomyolysis.  Patient reports extreme fatigue, as if she is climbing a hill all the time.  She states those symptoms have not worsened and have, in fact, improved a very very small amount.  She is quite concerned that the creatinine kinase has not been rechecked.    Patient Active Problem List    Diagnosis Date Noted   • Myositis of multiple sites 02/06/2019     Priority: High   • Polyarthritis 12/13/2018     Priority: High   • Palpitations 02/28/2019   • Generalized weakness 02/28/2019   • Left carotid bruit 02/28/2019   • Sensation of fullness in right ear 02/22/2019   • Frequent nocturnal awakening 02/22/2019   • Non-traumatic rhabdomyolysis 02/07/2019   • Normocytic anemia 02/07/2019   • Leukocytosis 02/06/2019   • Tobacco dependence 02/06/2019   • Elevated platelet count 02/05/2019   • Hospital discharge follow-up 02/05/2019   • Coronary artery disease involving native coronary artery of native heart without angina pectoris 01/29/2019   • Peripheral edema 01/19/2019   • Transaminitis 01/16/2019   • Elevated troponin 01/16/2019   • Ganglion cyst of wrist, right 12/13/2018   • Chronic pain of right knee 06/14/2018   • Seborrheic keratoses 06/14/2018   • Mild intermittent asthma without complication 04/10/2018   • Anxiety 04/10/2018   • Essential hypertension 03/24/2017   • Mixed hyperlipidemia 03/24/2017   • Overweight with body mass index (BMI) 25.0-29.9  03/24/2017       Allergies:Lisinopril and Statins [hmg-coa-r inhibitors]    Current Outpatient Prescriptions Ordered in [facility]   Medication Sig Dispense Refill   • Omega-3 1000 MG Cap Take  by mouth.     • Multiple Vitamins-Minerals (MULTIVITAMIN ADULT PO) Take  by mouth.     • predniSONE (DELTASONE) 10 MG Tab 50 mg x 1 day; 40 mg x 1 day; 30 mg x 1 day; 20 mg x 1 day; 10 mg x 1 day 1 Quantity Sufficient 0   • aspirin 81 MG tablet Take 1 Tab by mouth every evening. 30 Tab 11   • albuterol 108 (90 Base) MCG/ACT Aero Soln inhalation aerosol Inhale 2 Puffs by mouth every four hours as needed for Shortness of Breath. 1 Inhaler 0   • fluticasone (FLONASE) 50 MCG/ACT nasal spray Spray 1 Spray in nose every day. 16 g 0   • hydroxychloroquine (PLAQUENIL) 200 MG Tab Take 1 Tab by mouth every day. 30 Tab 1   • amLODIPine (NORVASC) 5 MG Tab Take 2 Tabs by mouth every day. 60 Tab 1     No current Epic-ordered facility-administered medications on file.        Past Medical History:   Diagnosis Date   • Heart attack (HCC)     2009    • Hypertension        Social History   Substance Use Topics   • Smoking status: Former Smoker     Packs/day: 0.50     Years: 35.00     Types: Cigarettes   • Smokeless tobacco: Never Used   • Alcohol use No       No family status information on file.   No family history on file.    Review of Systems:    Constitutional ROS: No unexpected change in weight, No weakness, No fatigue  Eye ROS: No recent significant change in vision, No eye pain, redness, discharge  Ear ROS: No drainage, No tinnitus or vertigo, No recent change in hearing  Mouth/Throat ROS: No teeth or gum problems, No bleeding gums, No tongue complaints  Neck ROS: No swollen glands, No significant pain in neck  Pulmonary ROS: No chronic cough, sputum, or hemoptysis, No dyspnea on exertion, No wheezing  Cardiovascular ROS: No diaphoresis, No edema, No palpitations  Gastrointestinal ROS: No change in bowel habits, No significant change in  appetite, No nausea, vomiting, diarrhea, or constipation  Musculoskeletal/Extremities ROS: No peripheral edema, No pain, redness or swelling on the joints  Hematologic/Lymphatic ROS: No chills, No night sweats, No weight loss  Skin/Integumentary ROS: Positive for rash.      Exam:  Blood pressure 146/88, pulse 93, temperature 36.4 °C (97.5 °F), resp. rate (!) 22, SpO2 93 %, not currently breastfeeding.  General: Well developed, well nourished. No distress.  HEENT: Head is grossly normal.  Pulmonary: Unlabored respiratory effort. Lungs clear to auscultation, no wheezes, no rhonchi.  Cardiovascular: Regular rate and rhythm without murmur.  Extremities: No extremity edema noted.  Neurologic: Grossly nonfocal. No facial asymmetry noted.  Skin: Warm, dry, good turgor.  Diffuse erythema noted on the chest, trunk, arms, and legs.  Blanches with pressure.  Psych: Normal mood. Alert and oriented x3. Judgment and insight is normal.    Assessment/Plan:  Discussed unknown etiology of the rash.  Do not recommend stopping any medications at this time.  Follow-up with primary care 3/19 as scheduled and reevaluate rash and new medications at that time.  Follow up for worsening or persistent symptoms.  Will contact patient with lab results.  1. Rash  CREATINE KINASE    predniSONE (DELTASONE) 10 MG Tab   2. Other fatigue  CREATINE KINASE

## 2019-03-14 ENCOUNTER — NON-PROVIDER VISIT (OUTPATIENT)
Dept: CARDIOLOGY | Facility: MEDICAL CENTER | Age: 69
End: 2019-03-14
Payer: MEDICARE

## 2019-03-14 ENCOUNTER — TELEPHONE (OUTPATIENT)
Dept: CARDIOLOGY | Facility: MEDICAL CENTER | Age: 69
End: 2019-03-14

## 2019-03-14 ENCOUNTER — SLEEP CENTER VISIT (OUTPATIENT)
Dept: SLEEP MEDICINE | Facility: MEDICAL CENTER | Age: 69
End: 2019-03-14
Payer: MEDICARE

## 2019-03-14 ENCOUNTER — HOSPITAL ENCOUNTER (OUTPATIENT)
Dept: RADIOLOGY | Facility: MEDICAL CENTER | Age: 69
End: 2019-03-14
Attending: NURSE PRACTITIONER
Payer: MEDICARE

## 2019-03-14 VITALS
HEIGHT: 64 IN | HEART RATE: 75 BPM | BODY MASS INDEX: 28.51 KG/M2 | RESPIRATION RATE: 15 BRPM | SYSTOLIC BLOOD PRESSURE: 124 MMHG | WEIGHT: 167 LBS | OXYGEN SATURATION: 94 % | DIASTOLIC BLOOD PRESSURE: 70 MMHG

## 2019-03-14 DIAGNOSIS — I10 ESSENTIAL HYPERTENSION: ICD-10-CM

## 2019-03-14 DIAGNOSIS — I47.10 SVT (SUPRAVENTRICULAR TACHYCARDIA): ICD-10-CM

## 2019-03-14 DIAGNOSIS — I44.1 SECOND DEGREE AV BLOCK: ICD-10-CM

## 2019-03-14 DIAGNOSIS — R09.89 BRUIT OF LEFT CAROTID ARTERY: ICD-10-CM

## 2019-03-14 DIAGNOSIS — G47.30 SLEEP DISORDER BREATHING: ICD-10-CM

## 2019-03-14 DIAGNOSIS — R00.2 PALPITATIONS: ICD-10-CM

## 2019-03-14 DIAGNOSIS — F51.04 CHRONIC INSOMNIA: ICD-10-CM

## 2019-03-14 DIAGNOSIS — E78.2 MIXED HYPERLIPIDEMIA: ICD-10-CM

## 2019-03-14 PROCEDURE — 99204 OFFICE O/P NEW MOD 45 MIN: CPT | Performed by: FAMILY MEDICINE

## 2019-03-14 PROCEDURE — 93880 EXTRACRANIAL BILAT STUDY: CPT

## 2019-03-14 PROCEDURE — 93880 EXTRACRANIAL BILAT STUDY: CPT | Mod: 26 | Performed by: INTERNAL MEDICINE

## 2019-03-14 RX ORDER — ZOLPIDEM TARTRATE 5 MG/1
5 TABLET ORAL NIGHTLY PRN
Qty: 2 TAB | Refills: 0 | Status: SHIPPED | OUTPATIENT
Start: 2019-03-14 | End: 2019-03-15

## 2019-03-14 NOTE — PROGRESS NOTES
"     [facility]  Consult Note     Date: 3/14/2019 / Time: 10:44 AM    Patient ID:   Name:             Vane Ritter   YOB: 1950  Age:                 68 y.o.  female   MRN:               [id number]      Thank you for requesting a sleep medicine consultation on Vane Ritter at the sleep center. She presents today with the chief complaints of snoring and occasional excessive daytime sleepiness. She is referred by Jannie Sosa for evaluation and treatment of sleep disorder breathing .     HISTORY OF PRESENT ILLNESS:       At night,  Vane Ritter goes to bed around 9:30 pm on weekdays and on the weekends. She gets out of bed at 8 am on weekdays and at 9-10 am on the weekends.  She averages about 3-4 hrs of sleep on a good night and 2 hrs on a bad night. Pt has bad nights about most nights per week. She falls asleep within hours. She awakens multiple times during the night due to primary care giver for his  who has dementia and chronic pain. It takes her hours to fall back asleep.     She is aware of snoring but denies witnessed apneas/gasping or choking in sleep.  She  denies any symptoms of restless legs syndrome such as an \"urge to move\"  She  legs in the evening or bedtime. She  denies any symptoms of narcolepsy such as sleep paralysis or cataplexy, or any symptoms to suggest parasomnias such as sleep walking or acting out of dreams. She  has not used any medications for the sleep problem.    Overall,She doesnot finds her sleep refreshing. When She  wakes up in the morning,She does feel tired. In terms of  excessive daytime sleepiness,She complains of sleepiness while  at work, while reading or watching TV however denies while driving. Lanse sleepiness scale score is normal at  1/24 .She does not take regular naps.She drinks about 2 caffeinated beverages per day.    REVIEW OF SYSTEMS:       Constitutional: Denies fevers, Denies weight " changes  Eyes: Denies changes in vision, no eye pain  Ears/Nose/Throat/Mouth: Denies nasal congestion or sore throat   Cardiovascular: Denies chest pain or palpitations   Respiratory: Denies shortness of breath , Denies cough  Gastrointestinal/Hepatic: Denies abdominal pain, nausea, vomiting, diarrhea, constipation or GI bleeding   Genitourinary: Denies bladder dysfunction, dysuria or frequency  Musculoskeletal/Rheum: Denies  joint pain and swelling   Skin/Breast: Denies rash  Neurological: Denies headache, confusion, memory loss or focal weakness/parasthesias  Psychiatric: denies mood disorder     Comprehensive review of systems form is reviewed with the patient and is attached in the EMR.     PMH:  has a past medical history of Bronchitis; Heart attack (HCC); and Hypertension.  MEDS:   Current Outpatient Prescriptions:   •  predniSONE (DELTASONE) 10 MG Tab, 50 mg x 1 day; 40 mg x 1 day; 30 mg x 1 day; 20 mg x 1 day; 10 mg x 1 day, Disp: 1 Quantity Sufficient, Rfl: 0  •  aspirin 81 MG tablet, Take 1 Tab by mouth every evening., Disp: 30 Tab, Rfl: 11  •  Omega-3 1000 MG Cap, Take  by mouth., Disp: , Rfl:   •  Multiple Vitamins-Minerals (MULTIVITAMIN ADULT PO), Take  by mouth., Disp: , Rfl:   •  albuterol 108 (90 Base) MCG/ACT Aero Soln inhalation aerosol, Inhale 2 Puffs by mouth every four hours as needed for Shortness of Breath., Disp: 1 Inhaler, Rfl: 0  •  fluticasone (FLONASE) 50 MCG/ACT nasal spray, Spray 1 Spray in nose every day., Disp: 16 g, Rfl: 0  •  hydroxychloroquine (PLAQUENIL) 200 MG Tab, Take 1 Tab by mouth every day. (Patient not taking: Reported on 3/14/2019), Disp: 30 Tab, Rfl: 1  •  amLODIPine (NORVASC) 5 MG Tab, Take 2 Tabs by mouth every day. (Patient not taking: Reported on 3/14/2019), Disp: 60 Tab, Rfl: 1  ALLERGIES:   Allergies   Allergen Reactions   • Lisinopril Cough   • Statins [Hmg-Coa-R Inhibitors]      SURGHX:   Past Surgical History:   Procedure Laterality Date   • CYST EXCISION       "right ankle, RA nodule    • OPEN REDUCTION     • STENT PLACEMENT      2009   • TUBAL COAGULATION LAPAROSCOPIC BILATERAL       SOCHX:  reports that she has quit smoking. Her smoking use included Cigarettes. She has a 17.50 pack-year smoking history. She has never used smokeless tobacco. She reports that she drinks alcohol. She reports that she does not use drugs.   FH:   Family History   Problem Relation Age of Onset   • Stroke Mother    • Stroke Father            Physical Exam:  Vitals/ General Appearance:   Weight/BMI: Body mass index is 28.67 kg/m².  Blood pressure 124/70, pulse 75, resp. rate 15, height 1.626 m (5' 4\"), weight 75.8 kg (167 lb), SpO2 94 %, not currently breastfeeding.  Vitals:    03/14/19 1038   BP: 124/70   BP Location: Right arm   Patient Position: Sitting   BP Cuff Size: Adult   Pulse: 75   Resp: 15   SpO2: 94%   Weight: 75.8 kg (167 lb)   Height: 1.626 m (5' 4\")       Pt. is alert and oriented to time, place and person. Cooperative and in no apparent distress.       1. Head: Atraumatic, normocephalic.   2. Ears: Normal tympanic membrane and no discharge  3. Nose: No inferior turbinate hypertophy  4. Throat: Oropharynx appears crowded in that the palate is overhanging (Malam Darlene scale 3. Tonsils are not enlarged, uvula is large, pharynx not inflamed. Tongue is large. has intact dentition and oral hygiene is fair.  5. Neck: Supple. No thyromegaly  6. Chest: Trachea central  7. Lungs auscultation: B/L good air entry, vesicular breath sounds, no wheezing/ronchi sounds  8. Heart auscultation: 1st and 2nd heart sounds normal, regular rhythm. No murmur.  9.Extremities:  no pedal edema.   Peripheral pulses felt.  11. Skin: No rash  12. NEUROLOGICAL EXAMINATION: On neurological exam, the patient was alert and oriented x3. speech was clear and fluent without dysarthria.     INVESTIGATIONS:       ASSESSMENT AND PLAN     1. She  has symptoms of Obstructive Sleep Apnea (AZAM). She  has excessive daytime " sleepiness that interferes with activites of daily living. She  risk factors for AZAM include crowded oropharynx.     The pathophysiology of AZAM and the increased risk of cardiovascular morbidity from untreated AZAM is discussed in detail with the patient. She  also has HTN which can be worsened by her AZAM.     We have discussed diagnostic options including in-laboratory, attended polysomnography and home sleep testing. We have also discussed treatment options including airway pressurization, reconstructive otolaryngologic surgery, dental appliances and weight management.       Subsequently,treatment options will be discussed based on the diagnostic study. Meanwhile, She is urged to avoid supine sleep, weight gain and alcoholic beverages since all of these can worsen AZAM. She is cautioned against drowsy driving. If She feels sleepy while driving, She must pull over for a break/nap, rather than persist on the road, in the interest of She own safety and that of others on the road.    Plan  -  She  will be scheduled for an overnight PSG to assess sleep related  breathing disorder.    2.   The evolution of insomnia is discussed in detail. Its is due to untreated AZAM and worries about her husbands health condition. The importance of cognitive restructuring and behavior modification therapy is emphasized for long-term improvement rather than the use of hypnotic agents, which may offer short term relief but may lead to the development of tolerance and side effects with prolonged use. Evening exercise, wind-down before bedtime, and stimulus control (leaving the bed when unable to fall back asleep at night) are explained.      Plan   -Handouts on stimulus control and sleep hygiene are given and a couple of titles of books on insomnia are recommended, written by behavioral psychologists.   - reassess after the treatment of AZAM   - sleep is recently disturbed due to rash as well     3. Regarding treatment of other past medical  problems and general health maintenance,  She is urged to follow up with PCP.

## 2019-03-14 NOTE — PATIENT INSTRUCTIONS
Sleep hygiene (ref: UpToDate)  ?Sleep as long as necessary to feel rested (usually seven to eight hours for adults) and then get out of bed  ?Maintain a regular sleep schedule, particularly a regular wake-up time in the morning  ?Try not to force sleep  ?Avoid caffeinated beverages after lunch  ?Avoid alcohol near bedtime (eg, late afternoon and evening)  ?Avoid smoking or other nicotine intake, particularly during the evening  ?Adjust the bedroom environment as needed to decrease stimuli (eg, reduce ambient light, turn off the television or radio)  ?Avoid use of light-emitting screens  (laptops, tablets, smartphones, Savosolarooks) 2 hours before bedtime   ?Resolve concerns or worries before bedtime  ?Exercise regularly for at least 20 minutes, preferably more than four to five hours prior to bedtime.  ?Avoid daytime naps, especially if they are longer than 20 to 30 minutes or occur late in the day    Stimulus control (Ref: UpToDate)    Patients with insomnia may associate their bed and bedroom with the fear of not sleeping or other arousing events, rather than the more pleasurable anticipation of sleep. The longer one stays in bed trying to sleep, the stronger the association becomes. This perpetuates the difficulty falling asleep.Stimulus control therapy is a strategy whose purpose is to disrupt this association by enhancing the likelihood of sleep . Patients should not go to bed until they are sleepy and should use the bed primarily for sleep (and not for reading, watching television, eating, or worrying). They should not spend more than 20 minutes in bed awake. If they are awake after 20 minutes, they should leave the bedroom and engage in a relaxing activity, such as reading or listening to soothing music. Patients should not engage in activities that stimulate them or reward them for being awake in the middle of the night, such as eating or watching television. In addition, they should not return to bed until they  "are tired and feel ready to sleep. If they return to bed and still cannot sleep within 20 minutes, the process should be repeated. An alarm should be set to wake the patient at the same time every morning, including weekends. Daytime naps are not allowed.    BOOKS:  \"Say Last to Insomnia\" by Thompson Nesbitt OR \"No More Sleepless Nights\" by Felix Medina    Sleep Apnea  Sleep apnea is a condition in which breathing pauses or becomes shallow during sleep. Episodes of sleep apnea usually last 10 seconds or longer, and they may occur as many as 20 times an hour. Sleep apnea disrupts your sleep and keeps your body from getting the rest that it needs. This condition can increase your risk of certain health problems, including:  · Heart attack.  · Stroke.  · Obesity.  · Diabetes.  · Heart failure.  · Irregular heartbeat.  There are three kinds of sleep apnea:  · Obstructive sleep apnea. This kind is caused by a blocked or collapsed airway.  · Central sleep apnea. This kind happens when the part of the brain that controls breathing does not send the correct signals to the muscles that control breathing.  · Mixed sleep apnea. This is a combination of obstructive and central sleep apnea.  What are the causes?  The most common cause of this condition is a collapsed or blocked airway. An airway can collapse or become blocked if:  · Your throat muscles are abnormally relaxed.  · Your tongue and tonsils are larger than normal.  · You are overweight.  · Your airway is smaller than normal.  What increases the risk?  This condition is more likely to develop in people who:  · Are overweight.  · Smoke.  · Have a smaller than normal airway.  · Are elderly.  · Are male.  · Drink alcohol.  · Take sedatives or tranquilizers.  · Have a family history of sleep apnea.  What are the signs or symptoms?  Symptoms of this condition include:  · Trouble staying asleep.  · Daytime sleepiness and tiredness.  · Irritability.  · Loud " snoring.  · Morning headaches.  · Trouble concentrating.  · Forgetfulness.  · Decreased interest in sex.  · Unexplained sleepiness.  · Mood swings.  · Personality changes.  · Feelings of depression.  · Waking up often during the night to urinate.  · Dry mouth.  · Sore throat.  How is this diagnosed?  This condition may be diagnosed with:  · A medical history.  · A physical exam.  · A series of tests that are done while you are sleeping (sleep study). These tests are usually done in a sleep lab, but they may also be done at home.  How is this treated?  Treatment for this condition aims to restore normal breathing and to ease symptoms during sleep. It may involve managing health issues that can affect breathing, such as high blood pressure or obesity. Treatment may include:  · Sleeping on your side.  · Using a decongestant if you have nasal congestion.  · Avoiding the use of depressants, including alcohol, sedatives, and narcotics.  · Losing weight if you are overweight.  · Making changes to your diet.  · Quitting smoking.  · Using a device to open your airway while you sleep, such as:  ¨ An oral appliance. This is a custom-made mouthpiece that shifts your lower jaw forward.  ¨ A continuous positive airway pressure (CPAP) device. This device delivers oxygen to your airway through a mask.  ¨ A nasal expiratory positive airway pressure (EPAP) device. This device has valves that you put into each nostril.  ¨ A bi-level positive airway pressure (BPAP) device. This device delivers oxygen to your airway through a mask.  · Surgery if other treatments do not work. During surgery, excess tissue is removed to create a wider airway.  It is important to get treatment for sleep apnea. Without treatment, this condition can lead to:  · High blood pressure.  · Coronary artery disease.  · (Men) An inability to achieve or maintain an erection (impotence).  · Reduced thinking abilities.  Follow these instructions at home:  · Make any  lifestyle changes that your health care provider recommends.  · Eat a healthy, well-balanced diet.  · Take over-the-counter and prescription medicines only as told by your health care provider.  · Avoid using depressants, including alcohol, sedatives, and narcotics.  · Take steps to lose weight if you are overweight.  · If you were given a device to open your airway while you sleep, use it only as told by your health care provider.  · Do not use any tobacco products, such as cigarettes, chewing tobacco, and e-cigarettes. If you need help quitting, ask your health care provider.  · Keep all follow-up visits as told by your health care provider. This is important.  Contact a health care provider if:  · The device that you received to open your airway during sleep is uncomfortable or does not seem to be working.  · Your symptoms do not improve.  · Your symptoms get worse.  Get help right away if:  · You develop chest pain.  · You develop shortness of breath.  · You develop discomfort in your back, arms, or stomach.  · You have trouble speaking.  · You have weakness on one side of your body.  · You have drooping in your face.  These symptoms may represent a serious problem that is an emergency. Do not wait to see if the symptoms will go away. Get medical help right away. Call your local emergency services (911 in the [place] Do not drive yourself to the hospital.   This information is not intended to replace advice given to you by your health care provider. Make sure you discuss any questions you have with your health care provider.  Document Released: 12/08/2003 Document Revised: 08/13/2017 Document Reviewed: 09/26/2016  [employer]vier Interactive Patient Education © 2017 [employer]

## 2019-03-19 ENCOUNTER — OFFICE VISIT (OUTPATIENT)
Dept: MEDICAL GROUP | Facility: PHYSICIAN GROUP | Age: 69
End: 2019-03-19
Payer: MEDICARE

## 2019-03-19 VITALS
RESPIRATION RATE: 20 BRPM | BODY MASS INDEX: 27.66 KG/M2 | HEART RATE: 84 BPM | DIASTOLIC BLOOD PRESSURE: 80 MMHG | WEIGHT: 162 LBS | TEMPERATURE: 98.2 F | SYSTOLIC BLOOD PRESSURE: 130 MMHG | OXYGEN SATURATION: 99 % | HEIGHT: 64 IN

## 2019-03-19 DIAGNOSIS — R21 RASH: ICD-10-CM

## 2019-03-19 DIAGNOSIS — M60.9 MYOSITIS OF MULTIPLE SITES, UNSPECIFIED MYOSITIS TYPE: ICD-10-CM

## 2019-03-19 DIAGNOSIS — G72.89 NECROTIZING MYOPATHY: ICD-10-CM

## 2019-03-19 DIAGNOSIS — M62.82 NON-TRAUMATIC RHABDOMYOLYSIS: ICD-10-CM

## 2019-03-19 DIAGNOSIS — L29.9 PRURITUS: ICD-10-CM

## 2019-03-19 DIAGNOSIS — M06.09 RHEUMATOID ARTHRITIS OF MULTIPLE SITES WITH NEGATIVE RHEUMATOID FACTOR (HCC): ICD-10-CM

## 2019-03-19 DIAGNOSIS — Z13.6 SCREENING FOR CARDIOVASCULAR CONDITION: ICD-10-CM

## 2019-03-19 PROCEDURE — 99214 OFFICE O/P EST MOD 30 MIN: CPT | Performed by: NURSE PRACTITIONER

## 2019-03-19 RX ORDER — HYDROXYCHLOROQUINE SULFATE 200 MG/1
200 TABLET, FILM COATED ORAL DAILY
Qty: 30 TAB | Refills: 1 | Status: ON HOLD | OUTPATIENT
Start: 2019-03-19 | End: 2020-05-29

## 2019-03-19 RX ORDER — HYDROXYZINE HYDROCHLORIDE 25 MG/1
25 TABLET, FILM COATED ORAL 3 TIMES DAILY PRN
Qty: 60 TAB | Refills: 1 | Status: SHIPPED | OUTPATIENT
Start: 2019-03-19 | End: 2019-11-26

## 2019-03-19 ASSESSMENT — PAIN SCALES - GENERAL: PAINLEVEL: NO PAIN

## 2019-03-19 NOTE — PROGRESS NOTES
"  CC: Continued muscle weakness, rash    HISTORY OF THE PRESENT ILLNESS: Patient is a 68 y.o. female. This pleasant patient is here today for evaluation management the following health problems.        Myositis of multiple sites  Patient continues to struggle with extreme muscle weakness and rhabdomyolysis.  Having hard times lifting her arms above her head to get dressed.  Her  has to help her dress.  Almost impossible to lift her legs up to put her pants on.  She can walk about 20 feet before she has to rest because of leg weakness.  Patient very discouraged and down that she cannot participate in any activities besides work due to muscle weakness.  Muscle biopsy results show scattered regenerating muscle fibers and a few necrotic muscle fibers, without associated significant chronic inflammation, \"necrotizing myopathy.\"  Biopsy does not show etiology.  Biopsy report suggests it could be from immune mediated, endocrine, paraneoplastic, infectious (viral).   Patient now presents with a rash, new onset approximately 2 weeks ago.  Please see additional notes under rash.   Patient has not seen rheumatology, has first appointment with Dr. Bond in 4 months.  I have messaged [facility] for recommendations, but have not heard back yet.  I will call Dr. Bond office to see if we can get patient in sooner or if she has any recommendations in the meantime.    Rash  Patient presents with a periodic rash on trunk arms and legs, onset 2 weeks ago.  Denies any new soaps, detergents, lotions.  She was seen in urgent care and was prescribed Medrol Dosepak, no improvement.  She has discontinued amlodipine, thinking she was having a reaction to it, but rash has not improved.  She discontinued hydroxychloroquine 5 days ago because she ran out of medication.  She reports her rash has not improved.  Unsure if this is related to necrotizing myopathy.  Will prescribe hydroxyzine for itching and refer to " dermatology.  Patient has upcoming appointment with rheumatology.      Allergies: Lisinopril and Statins [hmg-coa-r inhibitors]    Current Outpatient Prescriptions Ordered in [facility]   Medication Sig Dispense Refill   • hydroxychloroquine (PLAQUENIL) 200 MG Tab Take 1 Tab by mouth every day. 30 Tab 1   • hydrOXYzine HCl (ATARAX) 25 MG Tab Take 1 Tab by mouth 3 times a day as needed for Itching. 60 Tab 1   • Omega-3 1000 MG Cap Take  by mouth.     • Multiple Vitamins-Minerals (MULTIVITAMIN ADULT PO) Take  by mouth.     • aspirin 81 MG tablet Take 1 Tab by mouth every evening. 30 Tab 11   • albuterol 108 (90 Base) MCG/ACT Aero Soln inhalation aerosol Inhale 2 Puffs by mouth every four hours as needed for Shortness of Breath. 1 Inhaler 0   • fluticasone (FLONASE) 50 MCG/ACT nasal spray Spray 1 Spray in nose every day. 16 g 0   • amLODIPine (NORVASC) 5 MG Tab Take 2 Tabs by mouth every day. 60 Tab 1   • predniSONE (DELTASONE) 10 MG Tab 50 mg x 1 day; 40 mg x 1 day; 30 mg x 1 day; 20 mg x 1 day; 10 mg x 1 day (Patient not taking: Reported on 3/19/2019) 1 Quantity Sufficient 0     No current Epic-ordered facility-administered medications on file.        Past Medical History:   Diagnosis Date   • Bronchitis    • Heart attack (HCC)     2009    • Hypertension        Past Surgical History:   Procedure Laterality Date   • CYST EXCISION      right ankle, RA nodule    • OPEN REDUCTION     • STENT PLACEMENT      2009   • TUBAL COAGULATION LAPAROSCOPIC BILATERAL         Social History   Substance Use Topics   • Smoking status: Former Smoker     Packs/day: 0.50     Years: 35.00     Types: Cigarettes   • Smokeless tobacco: Never Used   • Alcohol use 2.4 oz/week     4 Cans of beer per week      Comment: occ       Family History   Problem Relation Age of Onset   • Stroke Mother    • Stroke Father        ROS:   As in HPI, otherwise negative for chest pain, dyspnea, abdominal pain, dysuria, blood in stool, fever        Exam: Blood pressure  "130/80, pulse 84, temperature 36.8 °C (98.2 °F), temperature source Temporal, resp. rate 20, height 1.626 m (5' 4\"), weight 73.5 kg (162 lb), SpO2 99 %, not currently breastfeeding. Body mass index is 27.81 kg/m².    General: Alert, fatigued appearing, well nourished, well developed female in NAD  Neck: Supple without bruit. Thyroid is not enlarged.  Pulmonary: Clear to ausculation.  Normal effort. No rales, ronchi, or wheezing.  Cardiovascular: Normal rate and rhythm without murmur. Carotid and radial pulses are intact and equal bilaterally.  No lower extremity edema.  Neurologic: Grossly nonfocal  Lymph: No cervical or supraclavicular lymph nodes are palpable  Skin: Warm and dry.  Diffuse mild erythema and sandpaper texture on arms, legs, trunk.  Musculoskeletal: Upper extremity strength 3/5 and equal bilaterally, lower extremity strength 3/5 and equal bilaterally..   Psych: Normal mood and affect. Alert and oriented. Judgment and insight is normal.    Please note that this dictation was created using voice recognition software. I have made every reasonable attempt to correct obvious errors, but I expect that there are errors of grammar and possibly content that I did not discover before finalizing the note.      Assessment/Plan    1. Rheumatoid arthritis of multiple sites with negative rheumatoid factor (HCC)  Patient will restart Plaquenil.  She has upcoming consultation with rheumatology in 4 months.  - hydroxychloroquine (PLAQUENIL) 200 MG Tab; Take 1 Tab by mouth every day.  Dispense: 30 Tab; Refill: 1    2. Rash  Patient will take hydroxyzine for itching.  I have referred her to dermatology urgently.  - hydrOXYzine HCl (ATARAX) 25 MG Tab; Take 1 Tab by mouth 3 times a day as needed for Itching.  Dispense: 60 Tab; Refill: 1  - REFERRAL TO DERMATOLOGY    3. Pruritus    - hydrOXYzine HCl (ATARAX) 25 MG Tab; Take 1 Tab by mouth 3 times a day as needed for Itching.  Dispense: 60 Tab; Refill: 1  - REFERRAL TO " DERMATOLOGY    4. Myositis of multiple sites, unspecified myositis type      5. Non-traumatic rhabdomyolysis  We will recheck CMP and GFR as creatinine kinase is still elevated.  - Comp Metabolic Panel; Future  - ESTIMATED GFR; Future    6. Necrotizing myopathy  Will refer to dermatology as patient now presents new rash, which necrotizing myopathy may be related to.  Will recheck out to rheumatology, Dr. Bond, to see if we can get patient in sooner.  - Comp Metabolic Panel; Future  - ESTIMATED GFR; Future  - REFERRAL TO DERMATOLOGY    7. Screening for cardiovascular condition    - Comp Metabolic Panel; Future  - ESTIMATED GFR; Future    Patient will restart amlodipine.  She will return to clinic as previously scheduled in 3 months or sooner if needed.

## 2019-03-20 ENCOUNTER — TELEPHONE (OUTPATIENT)
Dept: MEDICAL GROUP | Facility: PHYSICIAN GROUP | Age: 69
End: 2019-03-20

## 2019-03-20 PROBLEM — R21 RASH: Status: ACTIVE | Noted: 2019-03-20

## 2019-03-20 NOTE — TELEPHONE ENCOUNTER
Spoke with Dr. Bond, rheumatologist, this morning. Patient scheduled for new patient appointment in July 2019. Discussed muscle biopsy results, patient's presentation, and new onset rash.  Dr. Bond requested I send medical records to her, and she will try to get patient in tomorrow for new patient appointment.  Medical records were faxed this morning.

## 2019-03-20 NOTE — ASSESSMENT & PLAN NOTE
Patient presents with a periodic rash on trunk arms and legs, onset 2 weeks ago.  Denies any new soaps, detergents, lotions.  She was seen in urgent care and was prescribed Medrol Dosepak, no improvement.  She has discontinued amlodipine, thinking she was having a reaction to it, but rash has not improved.  She discontinued hydroxychloroquine 5 days ago because she ran out of medication.  She reports her rash has not improved.  Unsure if this is related to necrotizing myopathy.  Will prescribe hydroxyzine for itching and refer to dermatology.  Patient has upcoming appointment with rheumatology.

## 2019-03-20 NOTE — ASSESSMENT & PLAN NOTE
"Patient continues to struggle with extreme muscle weakness and rhabdomyolysis.  Having hard times lifting her arms above her head to get dressed.  Her  has to help her dress.  Almost impossible to lift her legs up to put her pants on.  She can walk about 20 feet before she has to rest because of leg weakness.  Patient very discouraged and down that she cannot participate in any activities besides work due to muscle weakness.  Muscle biopsy results show scattered regenerating muscle fibers and a few necrotic muscle fibers, without associated significant chronic inflammation, \"necrotizing myopathy.\"  Biopsy does not show etiology.  Biopsy report suggests it could be from immune mediated, endocrine, paraneoplastic, infectious (viral).   Patient now presents with a rash, new onset approximately 2 weeks ago.  Please see additional notes under rash.   Patient has not seen rheumatology, has first appointment with Dr. Bond in 4 months.  I [facility] for recommendations, but have not heard back yet.  I will call Dr. Bond office to see if we can get patient in sooner or if she has any recommendations in the meantime.  "

## 2019-03-21 ENCOUNTER — SLEEP STUDY (OUTPATIENT)
Dept: SLEEP MEDICINE | Facility: MEDICAL CENTER | Age: 69
End: 2019-03-21
Attending: FAMILY MEDICINE
Payer: MEDICARE

## 2019-03-21 ENCOUNTER — TELEPHONE (OUTPATIENT)
Dept: CARDIOLOGY | Facility: MEDICAL CENTER | Age: 69
End: 2019-03-21

## 2019-03-21 DIAGNOSIS — I25.10 CORONARY ARTERY DISEASE INVOLVING NATIVE CORONARY ARTERY OF NATIVE HEART WITHOUT ANGINA PECTORIS: ICD-10-CM

## 2019-03-21 DIAGNOSIS — F51.04 CHRONIC INSOMNIA: ICD-10-CM

## 2019-03-21 DIAGNOSIS — I65.23 BILATERAL CAROTID ARTERY STENOSIS: ICD-10-CM

## 2019-03-21 DIAGNOSIS — G47.30 SLEEP DISORDER BREATHING: ICD-10-CM

## 2019-03-21 PROCEDURE — 95811 POLYSOM 6/>YRS CPAP 4/> PARM: CPT | Performed by: INTERNAL MEDICINE

## 2019-03-21 NOTE — TELEPHONE ENCOUNTER
KUNAL Lira.  Ely Warren, R.N.             Please let patient know that her Carotid US showed bilateral carotid stenosis and that she will be referred to a vascular surgeon. I let Dr. Parrish know who she did have an apt with today but was rescheduled as well.    Just received her carotid ultrasound results showing:      There is evidence of significant atherosclerosis seen in both internal    carotid artery.    Severe bilateral internal carotid artery stenosis (>70).    Severe stenosis of the right internal carotid (>70%).     ---------------------------------------------------------------------  Spoke w/pt regarding above. She verbalized understanding. Requested the referral go to Dr. Burrell. Referral placed.

## 2019-03-22 NOTE — PROCEDURES
Clinical Comments:    The patient underwent a split night polysomnogram with a CPAP titration using the standard montage for measurement of paramaters of sleep, respiratory events, movement abnormalities, heart rate and rhythm.  A Microphone was used to monitior snoring.  Interpretation:  Study start time was 10:33:27 PM.  Total recording time was 2h 49.5m (169 minutes) with a total sleep time of 2h 23.0m (143 minutes) resulting in a sleep efficiency of 84.37%.  Sleep latency from the start fo the study was 10 minutes minutes and REM latency from sleep onset was 28 minutes minutes.  Respiratory:   There were 0 apneas in total consisting of 0 obstructive apneas, 0 mixed apneas, and 0 central apneas.  There were 23 hypopneas in total.  The apnea index was 0.00 per hour and the hypopnea index was 9.65 per hour.  The overall AHI was 9.7, with a REM AHI of 27.43, and a supine AHI of 0.00.  Limb Movements:  There were a total of 80 periodic leg movements, of which 15 were PLMS arousals.  This resulted in a PLMS index of 33.6 and a PLMS arousal index of 6.3  Oximetry:  The mean SaO2 was 91.0% for the diagnostic portion of the study, with a minimum SaO2 of 84.0%.    Treatment:  Interpretation:  Treatment recording time was 4h 23.5m (263 minutes) with a total sleep time of 3h 15.5m (195 minutes) resulting in a sleep efficiency of 74.2%.    Sleep latency from the start of treatment was 09 minutes minutes and REM latency from sleep onset was 0h 18.5m minutes.    The patient had 96 arousals in total for an arousal index of 29.5.  Respiratory:   There were 12 apneas in total consisting of  3 obstructive apneas, 9 central apneas, and 0 mixed apneas for an apnea index of 3.68.    The patient had 25 hypopneas in total, which resulted in a hypopnea index of 7.67.    The overall AHI was 11.36, with a REM AHI of 5.71, and a supine AHI of 17.14.     Limb Movements:  There were a total of 61 periodic leg movements, of which 6 were PLMS  arousals.  This resulted in a PLMS index of 18.7 and a PLMS arousal index of 1.8.  Oximetry:  The mean SaO2 during treatment was 92.0%, with a minimum oxygen saturation of 82.0%.  CPAP was tried from 5 to 10cm H2O.      On the baseline component of the study the sleep efficiency was normal at 84%.  Sleep architecture showed normal sleep stages.  Sleep latency was normal at 10 minutes.  EKG showed sinus rhythm.  There were mild elevated periodic limb movements with index 32.    Once asleep light snoring was noted associated with obstructive respiratory events.  The overall AHI was 9.7/h, comprised of 100% hypopneas.  There were associated oxygen desaturations to a alba of 85% for 58% of the time.    After meeting split-night criteria CPAP therapy was started at 5 cm of water and titrated to 9 cm of water, with resultant AHI of 3 and mean oximetry 92%.  The periodic limb movements improved with index reduced to 18.    Interpretation:  Mild obstructive sleep apnea syndrome, AHI 9.7/h, with desaturations to a alba of 85%.  Successful CPAP titration at 9 cm of water, with resolved sleep apnea and hypoxia.    Recommendations:  Treatment options for mild AZAM include CPAP, a dental appliance, or potentially UPPP.  The patient should follow-up in sleep clinic to discuss appropriate treatment.  She showed excellent response to CPAP: 9 cm of water using a small Dreamwear mask with heated humidification.  In general, avoidance of alcohol, sedatives, and muscle relaxants is advised as these can exacerbate sleep apnea.

## 2019-03-28 ENCOUNTER — SLEEP CENTER VISIT (OUTPATIENT)
Dept: SLEEP MEDICINE | Facility: MEDICAL CENTER | Age: 69
End: 2019-03-28
Payer: MEDICARE

## 2019-03-28 VITALS
OXYGEN SATURATION: 93 % | DIASTOLIC BLOOD PRESSURE: 74 MMHG | HEIGHT: 64 IN | WEIGHT: 164 LBS | BODY MASS INDEX: 28 KG/M2 | HEART RATE: 64 BPM | RESPIRATION RATE: 15 BRPM | SYSTOLIC BLOOD PRESSURE: 122 MMHG

## 2019-03-28 DIAGNOSIS — G47.33 OSA (OBSTRUCTIVE SLEEP APNEA): ICD-10-CM

## 2019-03-28 PROCEDURE — 99213 OFFICE O/P EST LOW 20 MIN: CPT | Performed by: FAMILY MEDICINE

## 2019-03-28 RX ORDER — METHOTREXATE 2.5 MG/1
TABLET ORAL
Refills: 3 | COMMUNITY
Start: 2019-03-21 | End: 2019-11-26

## 2019-03-28 NOTE — PROGRESS NOTES
[facility] Follow Up Note     Date: 3/28/2019 / Time: 9:51 AM    Patient ID:   Name:             Vane Ritter   YOB: 1950  Age:                 68 y.o.  female   MRN:               [id number]      Thank you for requesting a sleep medicine consultation on Vane Ritter at the sleep center. She presents today with the chief complaints of AZAM follow up.     HISTORY OF PRESENT ILLNESS:       Pt is currently not on PAP therapy. No change is sleep schedule. She goes to sleep around 11:30 pm on weekdays and on the weekends. She gets out of bed at 8 am on weekdays and at 9-10 am on the weekends.  She averages about 3-4 hrs of sleep on a good night and 2 hrs on a bad night. Overall, she doesnot finds her sleep refreshing. When She  wakes up in the morning, She does feel tired. She does not take regular naps.The symptoms of excessive daytime, snoring and gasping has improved. Sleep has slightly improved with improved sleep hygiene.     Since her last visit she had split night study which showed Mild obstructive sleep apnea syndrome, AHI 9.7/h. CPAP therapy was started at 5 cm of water and titrated to 9 cm of water, with resultant AHI of 3 and mean oximetry 92%.  The periodic limb movements improved with index reduced to 18.      REVIEW OF SYSTEMS:       Constitutional: Denies fevers, Denies weight changes  Eyes: Denies changes in vision, no eye pain  Ears/Nose/Throat/Mouth: Denies nasal congestion or sore throat   Cardiovascular: Denies chest pain or palpitations   Respiratory: Denies shortness of breath , Denies cough  Gastrointestinal/Hepatic: Denies abdominal pain, nausea, vomiting, diarrhea, constipation or GI bleeding   Genitourinary: Deniesdysuria or frequency  Musculoskeletal/Rheum: Denies  joint pain and swelling   Skin/Breast: Denies rash,   Neurological: Denies headache, confusion, memory loss or focal weakness/parasthesias  Psychiatric: denies mood disorder    Sleep: + EDS    Comprehensive review of systems form is reviewed with the patient and is attached in the EMR.     PMH:  has a past medical history of Bronchitis; Heart attack (HCC); and Hypertension.  MEDS:   Current Outpatient Prescriptions:   •  methotrexate 2.5 MG tablet, , Disp: , Rfl: 3  •  hydroxychloroquine (PLAQUENIL) 200 MG Tab, Take 1 Tab by mouth every day., Disp: 30 Tab, Rfl: 1  •  hydrOXYzine HCl (ATARAX) 25 MG Tab, Take 1 Tab by mouth 3 times a day as needed for Itching., Disp: 60 Tab, Rfl: 1  •  Multiple Vitamins-Minerals (MULTIVITAMIN ADULT PO), Take  by mouth., Disp: , Rfl:   •  predniSONE (DELTASONE) 10 MG Tab, 50 mg x 1 day; 40 mg x 1 day; 30 mg x 1 day; 20 mg x 1 day; 10 mg x 1 day, Disp: 1 Quantity Sufficient, Rfl: 0  •  aspirin 81 MG tablet, Take 1 Tab by mouth every evening., Disp: 30 Tab, Rfl: 11  •  albuterol 108 (90 Base) MCG/ACT Aero Soln inhalation aerosol, Inhale 2 Puffs by mouth every four hours as needed for Shortness of Breath., Disp: 1 Inhaler, Rfl: 0  •  fluticasone (FLONASE) 50 MCG/ACT nasal spray, Spray 1 Spray in nose every day., Disp: 16 g, Rfl: 0  •  amLODIPine (NORVASC) 5 MG Tab, Take 2 Tabs by mouth every day., Disp: 60 Tab, Rfl: 1  •  Omega-3 1000 MG Cap, Take  by mouth., Disp: , Rfl:   ALLERGIES:   Allergies   Allergen Reactions   • Lisinopril Cough   • Statins [Hmg-Coa-R Inhibitors]      SURGHX:   Past Surgical History:   Procedure Laterality Date   • CYST EXCISION      right ankle, RA nodule    • OPEN REDUCTION     • STENT PLACEMENT      2009   • TUBAL COAGULATION LAPAROSCOPIC BILATERAL       SOCHX:  reports that she has quit smoking. Her smoking use included Cigarettes. She has a 17.50 pack-year smoking history. She has never used smokeless tobacco. She reports that she drinks about 2.4 oz of alcohol per week . She reports that she does not use drugs..  FH:   Family History   Problem Relation Age of Onset   • Stroke Mother    • Stroke Father          Physical  "Exam:  Vitals/ General Appearance:   Weight/BMI: Body mass index is 28.15 kg/m².  Resp. rate 15, height 1.626 m (5' 4\"), weight 74.4 kg (164 lb), not currently breastfeeding.  Vitals:    03/28/19 0949   Resp: 15   Weight: 74.4 kg (164 lb)   Height: 1.626 m (5' 4\")       Pt. is alert and oriented to time, place and person. Cooperative and in no apparent distress.       1. Head: Atraumatic, normocephalic.   2. Ears: Normal tympanic membrane and no discharge  3. Nose: No inferior turbinate hypertophy  4. Throat: Oropharynx appears crowded in that the palate is overhanging   5. Neck: Supple. No thyromegaly  6. Chest: Trachea central, no spine deformity   7. Lungs auscultation: B/L good air entry, vesicular breath sounds, no adventitious sounds  8. Heart auscultation: 1st and 2nd heart sounds normal, regular rhythm. No appreciable murmur.  9. . Extremities:no pedal edema.  10. Skin: No rash  11. NEUROLOGICAL EXAMINATION: On neurological exam, the patient was alert and oriented x3. speech was clear and fluent without dysarthria.      ASSESSMENT AND PLAN     1. Sleep Apnea    The pathophysiology of sleep anea and the increased risk of cardiovascular morbidity from untreated sleep apnea is discussed in detail with the patient.    She is urged to avoid supine sleep, weight gain and alcoholic beverages since all of these can worsen sleep apnea. She is cautioned against drowsy driving. If She feels sleepy while driving, She must pull over for a break/nap, rather than persist on the road, in the interest of She own safety and that of others on the road.   Plan     - Auto CPAP vs overnight CPAP titration vs dental appliance and surgeries was dicussed in detail. After informed discussion CPAP 9  cm is ordered today    - F/u in 8-10 weeks to assess the efficiacy of recommended pressure    - HST was reviewed and discussed with the pt   - compliance was reinforced     2. Regarding treatment of other past medical problems and general " health maintenance,  She is urged to follow up with PCP.

## 2019-04-04 PROCEDURE — 0298T PR EXT ECG > 48HR TO 21 DAY REVIEW AND INTERPRETATN: CPT | Performed by: INTERNAL MEDICINE

## 2019-04-04 PROCEDURE — 0296T PR EXT ECG > 48HR TO 21 DAY RCRD W/CONECT INTL RCRD: CPT | Performed by: INTERNAL MEDICINE

## 2019-04-11 ENCOUNTER — TELEPHONE (OUTPATIENT)
Dept: CARDIOLOGY | Facility: MEDICAL CENTER | Age: 69
End: 2019-04-11

## 2019-04-11 NOTE — TELEPHONE ENCOUNTER
KUNAL Lira.  Ely Warren, R.N.             No significant arrhythmia noted on her cardiac monitor. Follow up with TF as scheduled on 5/9/19 or earlier if needed.      Results letter mailed to pt.

## 2019-04-11 NOTE — LETTER
April 11, 2019        Vane Ritter  [address] [address]          Dear Vane,    We have received the results of your recent:    Cardiac Monitor.    Your test came back with no significant arrhythmias. Good news! Please follow up as previously discussed with your physician.      Feel free to call us with any questions.        Sincerely,    Shari MARQUEZ

## 2019-05-09 ENCOUNTER — OFFICE VISIT (OUTPATIENT)
Dept: CARDIOLOGY | Facility: MEDICAL CENTER | Age: 69
End: 2019-05-09
Payer: MEDICARE

## 2019-05-09 VITALS
OXYGEN SATURATION: 95 % | HEART RATE: 74 BPM | HEIGHT: 64 IN | BODY MASS INDEX: 28.68 KG/M2 | DIASTOLIC BLOOD PRESSURE: 64 MMHG | WEIGHT: 168 LBS | SYSTOLIC BLOOD PRESSURE: 102 MMHG

## 2019-05-09 DIAGNOSIS — R09.89 LEFT CAROTID BRUIT: ICD-10-CM

## 2019-05-09 DIAGNOSIS — E78.2 MIXED HYPERLIPIDEMIA: ICD-10-CM

## 2019-05-09 DIAGNOSIS — M60.9 MYOSITIS OF MULTIPLE SITES, UNSPECIFIED MYOSITIS TYPE: ICD-10-CM

## 2019-05-09 DIAGNOSIS — I25.10 CORONARY ARTERY DISEASE INVOLVING NATIVE CORONARY ARTERY OF NATIVE HEART WITHOUT ANGINA PECTORIS: ICD-10-CM

## 2019-05-09 PROCEDURE — 99213 OFFICE O/P EST LOW 20 MIN: CPT | Performed by: INTERNAL MEDICINE

## 2019-05-09 RX ORDER — FOLIC ACID 1 MG/1
TABLET ORAL
Refills: 6 | COMMUNITY
Start: 2019-04-12 | End: 2019-11-26

## 2019-05-09 NOTE — PROGRESS NOTES
Chief Complaint   Patient presents with   • Coronary Artery Disease       Subjective:   Vane Ritter is a 68 y.o. female who presents today for cardiac evaluation.  The patient has a history of a small non-ST segment elevation myocardial infarction 2009.  This was noted on a stress test followed by a thallium perfusion study which was abnormal and then catheterization.  At that time disease in the mid RCA was noted and a stent was deployed.  Patient has had no symptoms that are similar to those of 2009.  She denies orthopnea, PND, pedal edema.  She does not have exertional dyspnea although her activity is quite limited due to her myositis.  There is no angina or chest pain, pressure, tightness with exertion.  She has had no stroke or TIA symptoms and there is no symptoms suggesting claudication although severe weakness in both legs is notable    Patient was hospitalized in January 2019 due to a markedly elevated CPK which was initially felt to be rhabdomyolysis, the diagnosis being changed to myositis after the clinical syndrome was evaluated thoroughly.  [facility].  She has been placed on methotrexate and prednisone which has not solved her problem.  She had [place] muscle [place] but was deemed uninterpretable probably due to the method of preservation.  Repeat muscle biopsy has been suggested.  The patient has extreme weakness primarily of her legs but affecting all muscle areas of her body.    Past Medical History:   Diagnosis Date   • Bronchitis    • Heart attack (HCC)     2009    • Hypertension      Past Surgical History:   Procedure Laterality Date   • CYST EXCISION      right ankle, RA nodule    • OPEN REDUCTION     • STENT PLACEMENT      2009   • TUBAL COAGULATION LAPAROSCOPIC BILATERAL       Family History   Problem Relation Age of Onset   • Stroke Mother    • Stroke Father      Social History     Social  "History   • Marital status:      Spouse name: N/A   • Number of children: N/A   • Years of education: N/A     Occupational History   • Not on file.     Social History Main Topics   • Smoking status: Former Smoker     Packs/day: 0.50     Years: 35.00     Types: Cigarettes   • Smokeless tobacco: Never Used   • Alcohol use 2.4 oz/week     4 Cans of beer per week      Comment: occ   • Drug use: No   • Sexual activity: Not Currently     Partners: Male     Other Topics Concern   • Not on file     Social History Narrative   • No narrative on file     Allergies   Allergen Reactions   • Lisinopril Cough   • Statins [Hmg-Coa-R Inhibitors]      Outpatient Encounter Prescriptions as of 5/9/2019   Medication Sig Dispense Refill   • folic acid (FOLVITE) 1 MG Tab   6   • methotrexate 2.5 MG tablet   3   • hydroxychloroquine (PLAQUENIL) 200 MG Tab Take 1 Tab by mouth every day. 30 Tab 1   • hydrOXYzine HCl (ATARAX) 25 MG Tab Take 1 Tab by mouth 3 times a day as needed for Itching. 60 Tab 1   • Omega-3 1000 MG Cap Take  by mouth.     • Multiple Vitamins-Minerals (MULTIVITAMIN ADULT PO) Take  by mouth.     • predniSONE (DELTASONE) 10 MG Tab 50 mg x 1 day; 40 mg x 1 day; 30 mg x 1 day; 20 mg x 1 day; 10 mg x 1 day 1 Quantity Sufficient 0   • aspirin 81 MG tablet Take 1 Tab by mouth every evening. 30 Tab 11   • albuterol 108 (90 Base) MCG/ACT Aero Soln inhalation aerosol Inhale 2 Puffs by mouth every four hours as needed for Shortness of Breath. 1 Inhaler 0   • fluticasone (FLONASE) 50 MCG/ACT nasal spray Spray 1 Spray in nose every day. 16 g 0   • amLODIPine (NORVASC) 5 MG Tab Take 2 Tabs by mouth every day. 60 Tab 1     No facility-administered encounter medications on file as of 5/9/2019.      ROS.  See HPI     Objective:   /64 (BP Location: Right arm, Patient Position: Sitting, BP Cuff Size: Adult)   Pulse 74   Ht 1.626 m (5' 4\")   Wt 76.2 kg (168 lb)   SpO2 95%   BMI 28.84 kg/m²     Physical Exam    Exam:    " "  Vitals:    05/09/19 1319   BP: 102/64   BP Location: Right arm   Patient Position: Sitting   BP Cuff Size: Adult   Pulse: 74   SpO2: 95%   Weight: 76.2 kg (168 lb)   Height: 1.626 m (5' 4\")     Constitutional: Well developed, well nourished lady in no acute distress. Appears approximate stated age and provides a good history.   HEENT: Normocephalic, pupils are equal and responsive.  Slight bilateral arcus. Conjunctiva and sclera are clear. No xanthelasma.  Mild diagonal ear lobe crease noted. Mucus membranes are moist and pink. Good oral hygiene  Neck: No JVD or HJR. JVP = 4-5cm H2O. Good carotid upstroke with no bruit. No lymphadenopathy, No thyroid enlargement.  Cardiovascular: Regular rhythm, no ectopics.  S1 greater than S2 at the apex.  No murmur, gallop, rub or click. No lift, heave,  thrill, or cardiomegaly  Lungs: Normal effort, Clear to auscultation and percussion. No rales, rhonchi, wheezing   Abdomen: Soft, non tender, moderately obese. No liver, kidney, spleen or mass palpable. The abdominal aorta is not palpable. Active bowel sounds are noted and there is no bruit  Extremities:  No cyanosis, edema, clubbing. Palpable posterior tibial and dorsal pedal pulses bilaterally.   Skin:   Warm and dry, no active lesions  Neurologic: Alert & oriented. Strength and sensation grossly intact. No lateralizing signs  Psychiatric:  Affect, mood, and judgement are appropriate    1/19/19: ECHO  CONCLUSIONS  No prior study is available for comparison.   Normal left ventricular chamber size.  Mild concentric left ventricular hypertrophy.  Normal left ventricular systolic function.     Left Ventricle  Normal left ventricular chamber size. Mild concentric left ventricular hypertrophy. Normal left ventricular systolic function. Normal diastolic function.    6/14/18:  Cholesterol,Tot 223   100 - 199 mg/dL Final   Triglycerides 209   0 - 149 mg/dL Final   HDL 33   >=40 mg/dL Final      <100 mg/dL Final "         Assessment:     1. Myositis of multiple sites, unspecified myositis type     2. Mixed hyperlipidemia     3. Coronary artery disease involving native coronary artery of native heart without angina pectoris     4. Left carotid bruit         Medical Decision Making:  Today's Assessment / Status / Plan:   Patient has myositis involving various muscles including those of the quadriceps and hamstring muscles which makes ambulation somewhat difficult.  The heart has been spared as there are no signs or symptoms of congestive heart failure and her cardiac exam is normal.  I think the patient should follow with therapeutic steps outlined by Dr. Mary.     She is asymptomatic from a cardiac standpoint although her activity level is quite low given her muscle disease noted above.  She has a carotid bruit which is going to be evaluated by Dr. Burrell.  The patient's hyperlipidemia is going to be difficult to address because of the concomitant myositis which probably contraindicates the use of statin therapy.  The patient has been intolerant of a few statins in the past.  Perhaps PCSK9 inhibitors could be considered.  I do not feel that the current time is optimal for introducing this form of therapy and would prefer to wait until her myositis can be addressed effectively with medications.  She can return on an as needed basis or if symptoms suggesting cardiac ischemia occur.

## 2019-05-14 ENCOUNTER — APPOINTMENT (RX ONLY)
Dept: URBAN - METROPOLITAN AREA CLINIC 22 | Facility: CLINIC | Age: 69
Setting detail: DERMATOLOGY
End: 2019-05-14

## 2019-05-14 DIAGNOSIS — L81.4 OTHER MELANIN HYPERPIGMENTATION: ICD-10-CM

## 2019-05-14 DIAGNOSIS — L82.1 OTHER SEBORRHEIC KERATOSIS: ICD-10-CM

## 2019-05-14 DIAGNOSIS — L82.0 INFLAMED SEBORRHEIC KERATOSIS: ICD-10-CM

## 2019-05-14 DIAGNOSIS — D22 MELANOCYTIC NEVI: ICD-10-CM

## 2019-05-14 DIAGNOSIS — D18.0 HEMANGIOMA: ICD-10-CM

## 2019-05-14 PROBLEM — I10 ESSENTIAL (PRIMARY) HYPERTENSION: Status: ACTIVE | Noted: 2019-05-14

## 2019-05-14 PROBLEM — D22.5 MELANOCYTIC NEVI OF TRUNK: Status: ACTIVE | Noted: 2019-05-14

## 2019-05-14 PROBLEM — D48.5 NEOPLASM OF UNCERTAIN BEHAVIOR OF SKIN: Status: ACTIVE | Noted: 2019-05-14

## 2019-05-14 PROBLEM — L85.3 XEROSIS CUTIS: Status: ACTIVE | Noted: 2019-05-14

## 2019-05-14 PROBLEM — D18.01 HEMANGIOMA OF SKIN AND SUBCUTANEOUS TISSUE: Status: ACTIVE | Noted: 2019-05-14

## 2019-05-14 PROCEDURE — 17111 DESTRUCTION B9 LESIONS 15/>: CPT

## 2019-05-14 PROCEDURE — ? LIQUID NITROGEN

## 2019-05-14 PROCEDURE — 99203 OFFICE O/P NEW LOW 30 MIN: CPT | Mod: 25

## 2019-05-14 PROCEDURE — ? BIOPSY BY SHAVE METHOD

## 2019-05-14 PROCEDURE — ? REFERRAL CORRESPONDENCE

## 2019-05-14 PROCEDURE — ? COUNSELING

## 2019-05-14 PROCEDURE — 11102 TANGNTL BX SKIN SINGLE LES: CPT | Mod: 59

## 2019-05-14 ASSESSMENT — LOCATION DETAILED DESCRIPTION DERM
LOCATION DETAILED: RIGHT SUPERIOR MEDIAL LOWER BACK
LOCATION DETAILED: EPIGASTRIC SKIN
LOCATION DETAILED: RIGHT MEDIAL UPPER BACK
LOCATION DETAILED: RIGHT SUPERIOR MEDIAL MIDBACK
LOCATION DETAILED: RIGHT DISTAL DORSAL FOREARM
LOCATION DETAILED: LEFT INFERIOR MEDIAL MIDBACK
LOCATION DETAILED: RIGHT INFERIOR MEDIAL MIDBACK
LOCATION DETAILED: RIGHT SUPERIOR UPPER BACK
LOCATION DETAILED: RIGHT INFERIOR MEDIAL UPPER BACK
LOCATION DETAILED: INFERIOR THORACIC SPINE
LOCATION DETAILED: SUPERIOR THORACIC SPINE
LOCATION DETAILED: SUPERIOR LUMBAR SPINE
LOCATION DETAILED: RIGHT SUPERIOR LATERAL MIDBACK

## 2019-05-14 ASSESSMENT — LOCATION ZONE DERM
LOCATION ZONE: ARM
LOCATION ZONE: TRUNK

## 2019-05-14 ASSESSMENT — LOCATION SIMPLE DESCRIPTION DERM
LOCATION SIMPLE: RIGHT FOREARM
LOCATION SIMPLE: UPPER BACK
LOCATION SIMPLE: LEFT LOWER BACK
LOCATION SIMPLE: RIGHT UPPER BACK
LOCATION SIMPLE: LOWER BACK
LOCATION SIMPLE: RIGHT LOWER BACK
LOCATION SIMPLE: ABDOMEN

## 2019-05-14 NOTE — PROCEDURE: LIQUID NITROGEN
Render Note In Bullet Format When Appropriate: No
Number Of Freeze-Thaw Cycles: 2 freeze-thaw cycles
Duration Of Freeze Thaw-Cycle (Seconds): 5-10
Medical Necessity Clause: This procedure was medically necessary because the lesions that were treated were:
Medical Necessity Information: It is in your best interest to select a reason for this procedure from the list below. All of these items fulfill various CMS LCD requirements except the new and changing color options.
Consent: The patient's consent was obtained including but not limited to risks of crusting, scabbing, blistering, scarring, darker or lighter pigmentary change, recurrence, incomplete removal and infection.
Detail Level: Detailed
Post-Care Instructions: I reviewed with the patient in detail post-care instructions. Patient is to wear sunprotection, and avoid picking at any of the treated lesions. Pt may apply Vaseline to crusted or scabbing areas.

## 2019-05-14 NOTE — HPI: UPPER BODY SKIN CHECK
How Severe Are Your Spot(S)?: severe
What Is The Reason For Today's Visit?: Skin Lesions
Additional History: The patient states that the lesions on he back are extremely itchy and are driving her crazy. She takes a back scratcher to her back and it sounds and feels like sand paper.

## 2019-05-14 NOTE — PROCEDURE: BIOPSY BY SHAVE METHOD
Anesthesia Type: 1% lidocaine with epinephrine and a 1:10 solution of 8.4% sodium bicarbonate
Size Of Lesion In Cm: 0.6
Lab Facility: 
Wound Care: Petrolatum
Additional Anesthesia Volume In Cc (Will Not Render If 0): 0
Cryotherapy Text: The wound bed was treated with cryotherapy after the biopsy was performed.
Render Post-Care Instructions In Note?: no
X Size Of Lesion In Cm: 0.4
Depth Of Biopsy: dermis
Biopsy Type: H and E
Electrodesiccation Text: The wound bed was treated with electrodesiccation after the biopsy was performed.
Type Of Destruction Used: Curettage
Billing Type: Third-Party Bill
Body Location Override (Optional - Billing Will Still Be Based On Selected Body Map Location If Applicable): left upper back
Electrodesiccation And Curettage Text: The wound bed was treated with electrodesiccation and curettage after the biopsy was performed.
Anesthesia Volume In Cc: 1
Post-Care Instructions: I reviewed with the patient in detail post-care instructions. Patient is to keep the biopsy site dry overnight, and then apply bacitracin twice daily until healed. Patient may apply hydrogen peroxide soaks to remove any crusting.
Dressing: Band-Aid
Was A Bandage Applied: Yes
Hemostasis: Pressure and suture ligation
Silver Nitrate Text: The wound bed was treated with silver nitrate after the biopsy was performed.
Detail Level: Detailed
Biopsy Method: Personna blade
Curettage Text: The wound bed was treated with curettage after the biopsy was performed.
Notification Instructions: Patient will be notified of biopsy results. However, patient instructed to call the office if not contacted within 2 weeks.
Consent: Written consent was obtained and risks were reviewed including but not limited to scarring, infection, bleeding, scabbing, incomplete removal, nerve damage and allergy to anesthesia.
Lab: [id number]

## 2019-05-23 ENCOUNTER — APPOINTMENT (OUTPATIENT)
Dept: SLEEP MEDICINE | Facility: MEDICAL CENTER | Age: 69
End: 2019-05-23
Payer: MEDICARE

## 2019-06-10 ENCOUNTER — HOSPITAL ENCOUNTER (OUTPATIENT)
Dept: LAB | Facility: MEDICAL CENTER | Age: 69
End: 2019-06-10
Attending: NURSE PRACTITIONER
Payer: MEDICARE

## 2019-06-10 DIAGNOSIS — M62.82 NON-TRAUMATIC RHABDOMYOLYSIS: ICD-10-CM

## 2019-06-10 DIAGNOSIS — Z13.6 SCREENING FOR CARDIOVASCULAR CONDITION: ICD-10-CM

## 2019-06-10 DIAGNOSIS — G72.89 NECROTIZING MYOPATHY: ICD-10-CM

## 2019-06-10 LAB
ALBUMIN SERPL BCP-MCNC: 3.6 G/DL (ref 3.2–4.9)
ALBUMIN/GLOB SERPL: 1.2 G/DL
ALP SERPL-CCNC: 71 U/L (ref 30–99)
ALT SERPL-CCNC: 60 U/L (ref 2–50)
ANION GAP SERPL CALC-SCNC: 8 MMOL/L (ref 0–11.9)
AST SERPL-CCNC: 47 U/L (ref 12–45)
BILIRUB SERPL-MCNC: 0.2 MG/DL (ref 0.1–1.5)
BUN SERPL-MCNC: 16 MG/DL (ref 8–22)
CALCIUM SERPL-MCNC: 9.7 MG/DL (ref 8.5–10.5)
CHLORIDE SERPL-SCNC: 98 MMOL/L (ref 96–112)
CO2 SERPL-SCNC: 26 MMOL/L (ref 20–33)
CREAT SERPL-MCNC: 0.81 MG/DL (ref 0.5–1.4)
GLOBULIN SER CALC-MCNC: 3.1 G/DL (ref 1.9–3.5)
GLUCOSE SERPL-MCNC: 95 MG/DL (ref 65–99)
POTASSIUM SERPL-SCNC: 3.9 MMOL/L (ref 3.6–5.5)
PROT SERPL-MCNC: 6.7 G/DL (ref 6–8.2)
SODIUM SERPL-SCNC: 132 MMOL/L (ref 135–145)

## 2019-06-10 PROCEDURE — 36415 COLL VENOUS BLD VENIPUNCTURE: CPT

## 2019-06-10 PROCEDURE — 80053 COMPREHEN METABOLIC PANEL: CPT

## 2019-06-25 ENCOUNTER — HOSPITAL ENCOUNTER (OUTPATIENT)
Dept: RADIOLOGY | Facility: MEDICAL CENTER | Age: 69
End: 2019-06-25
Attending: SURGERY
Payer: MEDICARE

## 2019-06-25 DIAGNOSIS — I65.23 BILATERAL CAROTID ARTERY STENOSIS: ICD-10-CM

## 2019-06-25 PROCEDURE — 700117 HCHG RX CONTRAST REV CODE 255: Performed by: SURGERY

## 2019-06-25 PROCEDURE — 70498 CT ANGIOGRAPHY NECK: CPT

## 2019-06-25 PROCEDURE — 70496 CT ANGIOGRAPHY HEAD: CPT

## 2019-06-25 RX ADMIN — IOHEXOL 100 ML: 350 INJECTION, SOLUTION INTRAVENOUS at 17:27

## 2019-07-09 ENCOUNTER — HOSPITAL ENCOUNTER (OUTPATIENT)
Dept: LAB | Facility: MEDICAL CENTER | Age: 69
End: 2019-07-09
Attending: INTERNAL MEDICINE
Payer: MEDICARE

## 2019-07-09 LAB
ALBUMIN SERPL BCP-MCNC: 3.6 G/DL (ref 3.2–4.9)
ALBUMIN/GLOB SERPL: 1.2 G/DL
ALP SERPL-CCNC: 51 U/L (ref 30–99)
ALT SERPL-CCNC: 105 U/L (ref 2–50)
ANION GAP SERPL CALC-SCNC: 9 MMOL/L (ref 0–11.9)
AST SERPL-CCNC: 75 U/L (ref 12–45)
BASOPHILS # BLD AUTO: 0.7 % (ref 0–1.8)
BASOPHILS # BLD: 0.1 K/UL (ref 0–0.12)
BILIRUB SERPL-MCNC: 0.5 MG/DL (ref 0.1–1.5)
BUN SERPL-MCNC: 13 MG/DL (ref 8–22)
CALCIUM SERPL-MCNC: 9.4 MG/DL (ref 8.5–10.5)
CHLORIDE SERPL-SCNC: 99 MMOL/L (ref 96–112)
CK SERPL-CCNC: 2409 U/L (ref 0–154)
CO2 SERPL-SCNC: 26 MMOL/L (ref 20–33)
CREAT SERPL-MCNC: 0.74 MG/DL (ref 0.5–1.4)
CRP SERPL HS-MCNC: 1.61 MG/DL (ref 0–0.75)
EOSINOPHIL # BLD AUTO: 0.37 K/UL (ref 0–0.51)
EOSINOPHIL NFR BLD: 2.5 % (ref 0–6.9)
ERYTHROCYTE [DISTWIDTH] IN BLOOD BY AUTOMATED COUNT: 51.3 FL (ref 35.9–50)
ERYTHROCYTE [SEDIMENTATION RATE] IN BLOOD BY WESTERGREN METHOD: 19 MM/HOUR (ref 0–30)
GLOBULIN SER CALC-MCNC: 3.1 G/DL (ref 1.9–3.5)
GLUCOSE SERPL-MCNC: 75 MG/DL (ref 65–99)
HCT VFR BLD AUTO: 41.7 % (ref 37–47)
HGB BLD-MCNC: 13.8 G/DL (ref 12–16)
IMM GRANULOCYTES # BLD AUTO: 0.2 K/UL (ref 0–0.11)
IMM GRANULOCYTES NFR BLD AUTO: 1.4 % (ref 0–0.9)
LDH SERPL L TO P-CCNC: 457 U/L (ref 107–266)
LYMPHOCYTES # BLD AUTO: 2.4 K/UL (ref 1–4.8)
LYMPHOCYTES NFR BLD: 16.2 % (ref 22–41)
MCH RBC QN AUTO: 29.6 PG (ref 27–33)
MCHC RBC AUTO-ENTMCNC: 33.1 G/DL (ref 33.6–35)
MCV RBC AUTO: 89.3 FL (ref 81.4–97.8)
MONOCYTES # BLD AUTO: 1.1 K/UL (ref 0–0.85)
MONOCYTES NFR BLD AUTO: 7.4 % (ref 0–13.4)
NEUTROPHILS # BLD AUTO: 10.61 K/UL (ref 2–7.15)
NEUTROPHILS NFR BLD: 71.8 % (ref 44–72)
NRBC # BLD AUTO: 0 K/UL
NRBC BLD-RTO: 0 /100 WBC
PLATELET # BLD AUTO: 419 K/UL (ref 164–446)
PMV BLD AUTO: 8.7 FL (ref 9–12.9)
POTASSIUM SERPL-SCNC: 3.8 MMOL/L (ref 3.6–5.5)
PROT SERPL-MCNC: 6.7 G/DL (ref 6–8.2)
RBC # BLD AUTO: 4.67 M/UL (ref 4.2–5.4)
SODIUM SERPL-SCNC: 134 MMOL/L (ref 135–145)
WBC # BLD AUTO: 14.8 K/UL (ref 4.8–10.8)

## 2019-07-09 PROCEDURE — 85652 RBC SED RATE AUTOMATED: CPT

## 2019-07-09 PROCEDURE — 86140 C-REACTIVE PROTEIN: CPT

## 2019-07-09 PROCEDURE — 82550 ASSAY OF CK (CPK): CPT

## 2019-07-09 PROCEDURE — 85025 COMPLETE CBC W/AUTO DIFF WBC: CPT

## 2019-07-09 PROCEDURE — 80053 COMPREHEN METABOLIC PANEL: CPT

## 2019-07-09 PROCEDURE — 36415 COLL VENOUS BLD VENIPUNCTURE: CPT

## 2019-07-09 PROCEDURE — 83615 LACTATE (LD) (LDH) ENZYME: CPT

## 2019-07-16 ENCOUNTER — OFFICE VISIT (OUTPATIENT)
Dept: MEDICAL GROUP | Facility: PHYSICIAN GROUP | Age: 69
End: 2019-07-16
Payer: MEDICARE

## 2019-07-16 VITALS
HEIGHT: 64 IN | TEMPERATURE: 97.3 F | RESPIRATION RATE: 20 BRPM | WEIGHT: 168 LBS | BODY MASS INDEX: 28.68 KG/M2 | HEART RATE: 69 BPM | DIASTOLIC BLOOD PRESSURE: 70 MMHG | SYSTOLIC BLOOD PRESSURE: 108 MMHG | OXYGEN SATURATION: 96 %

## 2019-07-16 DIAGNOSIS — E78.2 MIXED HYPERLIPIDEMIA: ICD-10-CM

## 2019-07-16 DIAGNOSIS — M60.9 MYOSITIS OF MULTIPLE SITES, UNSPECIFIED MYOSITIS TYPE: ICD-10-CM

## 2019-07-16 DIAGNOSIS — I25.10 CORONARY ARTERY DISEASE INVOLVING NATIVE CORONARY ARTERY OF NATIVE HEART WITHOUT ANGINA PECTORIS: ICD-10-CM

## 2019-07-16 DIAGNOSIS — I10 ESSENTIAL HYPERTENSION: ICD-10-CM

## 2019-07-16 PROCEDURE — 99214 OFFICE O/P EST MOD 30 MIN: CPT | Performed by: NURSE PRACTITIONER

## 2019-07-16 RX ORDER — HYDROCHLOROTHIAZIDE 12.5 MG/1
12.5 TABLET ORAL
Refills: 1 | COMMUNITY
Start: 2019-06-01 | End: 2019-09-10

## 2019-07-16 RX ORDER — BISOPROLOL FUMARATE 10 MG/1
TABLET, FILM COATED ORAL
Refills: 1 | COMMUNITY
Start: 2019-06-04 | End: 2019-09-11

## 2019-07-16 ASSESSMENT — PATIENT HEALTH QUESTIONNAIRE - PHQ9: CLINICAL INTERPRETATION OF PHQ2 SCORE: 0

## 2019-07-16 ASSESSMENT — PAIN SCALES - GENERAL: PAINLEVEL: NO PAIN

## 2019-07-16 NOTE — PATIENT INSTRUCTIONS
Please make sure you get a hold of Dr. Bond office about plasma infusion to be sent to [place] instead of [place]    Continue care with specialists, including rheumatology, vascular medicine and cardiology    See me in 4 months, sooner if needed

## 2019-07-16 NOTE — PROGRESS NOTES
Chief Complaint   Patient presents with   • Arthritis         This is a 68 y.o.female patient that presents today with the following: Establish care with new provider, discuss acute and chronic conditions    Myositis of multiple sites  Patient continues to struggle with extreme muscle weakness and has history of rhabdomyolysis.  She is now followed by rheumatologist and is on treatment.  Recent CPK elevated, she is supposed to be having plasma infusion coordinated by rheumatologist..    Essential hypertension  Chronic and stable at this time.  Blood pressure today within normal limits.  On amlodipine and hydrochlorthiazide.    Coronary artery disease involving native coronary artery of native heart without angina pectoris  Patient is closely followed by cardiology, clinically stable at this time other than her severe muscle weakness (see additional notes).      Hospital Outpatient Visit on 07/09/2019   Component Date Value   • WBC 07/09/2019 14.8*   • RBC 07/09/2019 4.67    • Hemoglobin 07/09/2019 13.8    • Hematocrit 07/09/2019 41.7    • MCV 07/09/2019 89.3    • MCH 07/09/2019 29.6    • MCHC 07/09/2019 33.1*   • RDW 07/09/2019 51.3*   • Platelet Count 07/09/2019 419    • MPV 07/09/2019 8.7*   • Neutrophils-Polys 07/09/2019 71.80    • Lymphocytes 07/09/2019 16.20*   • Monocytes 07/09/2019 7.40    • Eosinophils 07/09/2019 2.50    • Basophils 07/09/2019 0.70    • Immature Granulocytes 07/09/2019 1.40*   • Nucleated RBC 07/09/2019 0.00    • Neutrophils (Absolute) 07/09/2019 10.61*   • Lymphs (Absolute) 07/09/2019 2.40    • Monos (Absolute) 07/09/2019 1.10*   • Eos (Absolute) 07/09/2019 0.37    • Baso (Absolute) 07/09/2019 0.10    • Immature Granulocytes (a* 07/09/2019 0.20*   • NRBC (Absolute) 07/09/2019 0.00    • GFR If  07/09/2019 >60    • GFR If Non  [facility]i* 07/09/2019 >60    • LDH Total 07/09/2019 457*   • Stat C-Reactive Protein 07/09/2019 1.61*   • Sodium 07/09/2019 134*   • Potassium  07/09/2019 3.8    • Chloride 07/09/2019 99    • Co2 07/09/2019 26    • Anion Gap 07/09/2019 9.0    • Glucose 07/09/2019 75    • Bun 07/09/2019 13    • Creatinine 07/09/2019 0.74    • Calcium 07/09/2019 9.4    • AST(SGOT) 07/09/2019 75*   • ALT(SGPT) 07/09/2019 105*   • Alkaline Phosphatase 07/09/2019 51    • Total Bilirubin 07/09/2019 0.5    • Albumin 07/09/2019 3.6    • Total Protein 07/09/2019 6.7    • Globulin 07/09/2019 3.1    • A-G Ratio 07/09/2019 1.2    • CPK Total 07/09/2019 2409*   • Sed Rate Westergren 07/09/2019 19          clinical course has been stable    Past Medical History:   Diagnosis Date   • Bronchitis    • Heart attack (HCC)     2009    • Hypertension        Past Surgical History:   Procedure Laterality Date   • CYST EXCISION      right ankle, RA nodule    • OPEN REDUCTION     • STENT PLACEMENT      2009   • TUBAL COAGULATION LAPAROSCOPIC BILATERAL         Family History   Problem Relation Age of Onset   • Stroke Mother    • Stroke Father        Lisinopril and Statins [hmg-coa-r inhibitors]    Current Outpatient Prescriptions Ordered in [facility]   Medication Sig Dispense Refill   • bisoprolol (ZEBETA) 10 MG tablet TAKE 1 & 1 2 (ONE & ONE HALF) TABLETS BY MOUTH ONCE DAILY  1   • hydroCHLOROthiazide (HYDRODIURIL) 12.5 MG tablet Take 12.5 mg by mouth.  1   • folic acid (FOLVITE) 1 MG Tab   6   • methotrexate 2.5 MG tablet   3   • hydroxychloroquine (PLAQUENIL) 200 MG Tab Take 1 Tab by mouth every day. 30 Tab 1   • hydrOXYzine HCl (ATARAX) 25 MG Tab Take 1 Tab by mouth 3 times a day as needed for Itching. 60 Tab 1   • Omega-3 1000 MG Cap Take  by mouth.     • Multiple Vitamins-Minerals (MULTIVITAMIN ADULT PO) Take  by mouth.     • predniSONE (DELTASONE) 10 MG Tab 50 mg x 1 day; 40 mg x 1 day; 30 mg x 1 day; 20 mg x 1 day; 10 mg x 1 day 1 Quantity Sufficient 0   • aspirin 81 MG tablet Take 1 Tab by mouth every evening. 30 Tab 11   • amLODIPine (NORVASC) 5 MG Tab Take 2 Tabs by mouth every day. 60 Tab 1  "  • albuterol 108 (90 Base) MCG/ACT Aero Soln inhalation aerosol Inhale 2 Puffs by mouth every four hours as needed for Shortness of Breath. 1 Inhaler 0   • fluticasone (FLONASE) 50 MCG/ACT nasal spray Spray 1 Spray in nose every day. 16 g 0     No current Epic-ordered facility-administered medications on file.        Constitutional ROS: No unexpected change in weight, No weakness, No unexplained fevers, sweats, or chills  Pulmonary ROS: No chronic cough, sputum, or hemoptysis, No shortness of breath, No recent change in breathing  Cardiovascular ROS: No chest pain, No edema, No palpitations, Positive for peripheral vascular disease, hypertension  Gastrointestinal ROS: No abdominal pain, No nausea, vomiting, diarrhea, or constipation  Musculoskeletal/Extremities ROS: Positive per HPI  Neurologic ROS: Normal development, No seizures, No weakness    Physical exam:  /70 (BP Location: Left arm, Patient Position: Sitting, BP Cuff Size: Adult)   Pulse 69   Temp 36.3 °C (97.3 °F) (Temporal)   Resp 20   Ht 1.626 m (5' 4\")   Wt 76.2 kg (168 lb)   SpO2 96%   Breastfeeding? No   BMI 28.84 kg/m²   General Appearance: Older female, alert, no distress, moderately overweight, well-groomed  Skin: Skin color, texture, turgor normal. No rashes or lesions.  Lungs: negative findings: normal respiratory rate and rhythm, lungs clear to auscultation  Heart: negative. RRR without murmur, gallop, or rubs.  No ectopy.  Abdomen: Abdomen soft, non-tender. BS normal. No masses,  No organomegaly  Musculoskeletal: positive findings: Widespread muscle weakness, impaired gait  Neurologic: intact    Medical decision making/discussion: Patient was strongly advised to keep all upcoming appointments with specialists including rheumatology, cardiology and vascular medicine.  She is to follow-up with me in 4 months, sooner if needed.  She does understand what to present to the emergency room for.    Vane was seen today for " arthritis.    Diagnoses and all orders for this visit:    Essential hypertension    Mixed hyperlipidemia    Coronary artery disease involving native coronary artery of native heart without angina pectoris    Myositis of multiple sites, unspecified myositis type        Return in about 4 months (around 11/16/2019) for Follow-up.        Please note that this dictation was created using voice recognition software. I have made every reasonable attempt to correct obvious errors, but I expect that there are errors of grammar and possibly content that I did not discover before finalizing the note.

## 2019-07-17 NOTE — ASSESSMENT & PLAN NOTE
Chronic and stable at this time.  Blood pressure today within normal limits.  On amlodipine and hydrochlorthiazide.

## 2019-07-17 NOTE — ASSESSMENT & PLAN NOTE
Patient continues to struggle with extreme muscle weakness and has history of rhabdomyolysis.  She is now followed by rheumatologist and is on treatment.  Recent CPK elevated, she is supposed to be having plasma infusion coordinated by rheumatologist..

## 2019-07-17 NOTE — ASSESSMENT & PLAN NOTE
Patient is closely followed by cardiology, clinically stable at this time other than her severe muscle weakness (see additional notes).

## 2019-08-28 ENCOUNTER — TELEPHONE (OUTPATIENT)
Dept: RADIOLOGY | Facility: MEDICAL CENTER | Age: 69
End: 2019-08-28

## 2019-08-28 NOTE — TELEPHONE ENCOUNTER
LM REQUESTING AN [facility] DEXA ORDER BE PLACED; PT WAS SCHEDULED W/O AN ORDER IN [facility]

## 2019-09-04 ENCOUNTER — TELEPHONE (OUTPATIENT)
Dept: RADIOLOGY | Facility: MEDICAL CENTER | Age: 69
End: 2019-09-04

## 2019-09-04 ENCOUNTER — TELEPHONE (OUTPATIENT)
Dept: MEDICAL GROUP | Facility: PHYSICIAN GROUP | Age: 69
End: 2019-09-04

## 2019-09-04 NOTE — TELEPHONE ENCOUNTER
PT MADE AWARE WE ARE WAITING ON A BONE DENSITY ORDER TO BE SUBMITTED BY PROVIDER STERLING ESPINOZA/[facility]AR

## 2019-09-05 ENCOUNTER — HOSPITAL ENCOUNTER (OUTPATIENT)
Dept: RADIOLOGY | Facility: MEDICAL CENTER | Age: 69
End: 2019-09-05
Attending: NURSE PRACTITIONER
Payer: MEDICARE

## 2019-09-05 ENCOUNTER — TELEPHONE (OUTPATIENT)
Dept: URGENT CARE | Facility: PHYSICIAN GROUP | Age: 69
End: 2019-09-05

## 2019-09-05 DIAGNOSIS — Z78.0 POSTMENOPAUSAL ESTROGEN DEFICIENCY: ICD-10-CM

## 2019-09-05 DIAGNOSIS — Z12.31 SCREENING MAMMOGRAM, ENCOUNTER FOR: ICD-10-CM

## 2019-09-05 PROCEDURE — 77080 DXA BONE DENSITY AXIAL: CPT

## 2019-09-05 PROCEDURE — 77063 BREAST TOMOSYNTHESIS BI: CPT

## 2019-09-05 NOTE — TELEPHONE ENCOUNTER
[facility]. [facility]                                        Call Back Number: [phone]      Patient approves a detailed voicemail message: no    Kasey is requesting an order for a bone density scan for patient. Patient's appointment is scheduled for 9/5/19 @ 1 pm. Can they please get the order placed asa. Thank you.

## 2019-09-05 NOTE — TELEPHONE ENCOUNTER
Kasey at ext [phone] has left several messages in regards to a bone density order needed for this patient. Patient appt is today. She can be reached at ext [phone]. Thank you.

## 2019-09-07 DIAGNOSIS — J45.20 MILD INTERMITTENT ASTHMA WITHOUT COMPLICATION: ICD-10-CM

## 2019-09-07 DIAGNOSIS — I10 ESSENTIAL HYPERTENSION: ICD-10-CM

## 2019-09-09 DIAGNOSIS — I10 ESSENTIAL HYPERTENSION: ICD-10-CM

## 2019-09-10 ENCOUNTER — HOSPITAL ENCOUNTER (OUTPATIENT)
Dept: LAB | Facility: MEDICAL CENTER | Age: 69
End: 2019-09-10
Attending: INTERNAL MEDICINE
Payer: MEDICARE

## 2019-09-10 LAB
ALBUMIN SERPL BCP-MCNC: 3.6 G/DL (ref 3.2–4.9)
ALBUMIN/GLOB SERPL: 0.9 G/DL
ALP SERPL-CCNC: 60 U/L (ref 30–99)
ALT SERPL-CCNC: 62 U/L (ref 2–50)
ANION GAP SERPL CALC-SCNC: 8 MMOL/L (ref 0–11.9)
AST SERPL-CCNC: 60 U/L (ref 12–45)
BASOPHILS # BLD AUTO: 0.6 % (ref 0–1.8)
BASOPHILS # BLD: 0.05 K/UL (ref 0–0.12)
BILIRUB SERPL-MCNC: 0.3 MG/DL (ref 0.1–1.5)
BUN SERPL-MCNC: 14 MG/DL (ref 8–22)
CALCIUM SERPL-MCNC: 9.1 MG/DL (ref 8.5–10.5)
CHLORIDE SERPL-SCNC: 99 MMOL/L (ref 96–112)
CK SERPL-CCNC: 2114 U/L (ref 0–154)
CO2 SERPL-SCNC: 28 MMOL/L (ref 20–33)
CREAT SERPL-MCNC: 0.78 MG/DL (ref 0.5–1.4)
CRP SERPL HS-MCNC: 1.62 MG/DL (ref 0–0.75)
EOSINOPHIL # BLD AUTO: 0.36 K/UL (ref 0–0.51)
EOSINOPHIL NFR BLD: 4.1 % (ref 0–6.9)
ERYTHROCYTE [DISTWIDTH] IN BLOOD BY AUTOMATED COUNT: 46.3 FL (ref 35.9–50)
ERYTHROCYTE [SEDIMENTATION RATE] IN BLOOD BY WESTERGREN METHOD: 31 MM/HOUR (ref 0–30)
GLOBULIN SER CALC-MCNC: 4.1 G/DL (ref 1.9–3.5)
GLUCOSE SERPL-MCNC: 97 MG/DL (ref 65–99)
HCT VFR BLD AUTO: 41.8 % (ref 37–47)
HGB BLD-MCNC: 13.7 G/DL (ref 12–16)
IMM GRANULOCYTES # BLD AUTO: 0.06 K/UL (ref 0–0.11)
IMM GRANULOCYTES NFR BLD AUTO: 0.7 % (ref 0–0.9)
LDH SERPL L TO P-CCNC: 317 U/L (ref 107–266)
LYMPHOCYTES # BLD AUTO: 1.28 K/UL (ref 1–4.8)
LYMPHOCYTES NFR BLD: 14.5 % (ref 22–41)
MCH RBC QN AUTO: 29 PG (ref 27–33)
MCHC RBC AUTO-ENTMCNC: 32.8 G/DL (ref 33.6–35)
MCV RBC AUTO: 88.4 FL (ref 81.4–97.8)
MONOCYTES # BLD AUTO: 0.82 K/UL (ref 0–0.85)
MONOCYTES NFR BLD AUTO: 9.3 % (ref 0–13.4)
NEUTROPHILS # BLD AUTO: 6.24 K/UL (ref 2–7.15)
NEUTROPHILS NFR BLD: 70.8 % (ref 44–72)
NRBC # BLD AUTO: 0 K/UL
NRBC BLD-RTO: 0 /100 WBC
PLATELET # BLD AUTO: 379 K/UL (ref 164–446)
PMV BLD AUTO: 8.9 FL (ref 9–12.9)
POTASSIUM SERPL-SCNC: 4.1 MMOL/L (ref 3.6–5.5)
PROT SERPL-MCNC: 7.7 G/DL (ref 6–8.2)
RBC # BLD AUTO: 4.73 M/UL (ref 4.2–5.4)
SODIUM SERPL-SCNC: 135 MMOL/L (ref 135–145)
WBC # BLD AUTO: 8.8 K/UL (ref 4.8–10.8)

## 2019-09-10 PROCEDURE — 82550 ASSAY OF CK (CPK): CPT

## 2019-09-10 PROCEDURE — 86140 C-REACTIVE PROTEIN: CPT

## 2019-09-10 PROCEDURE — 80053 COMPREHEN METABOLIC PANEL: CPT

## 2019-09-10 PROCEDURE — 83615 LACTATE (LD) (LDH) ENZYME: CPT

## 2019-09-10 PROCEDURE — 36415 COLL VENOUS BLD VENIPUNCTURE: CPT

## 2019-09-10 PROCEDURE — 85025 COMPLETE CBC W/AUTO DIFF WBC: CPT

## 2019-09-10 PROCEDURE — 85652 RBC SED RATE AUTOMATED: CPT

## 2019-09-10 RX ORDER — HYDROCHLOROTHIAZIDE 12.5 MG/1
TABLET ORAL
Qty: 90 TAB | Refills: 1 | Status: SHIPPED | OUTPATIENT
Start: 2019-09-10 | End: 2019-11-26

## 2019-09-10 RX ORDER — ALBUTEROL SULFATE 90 UG/1
AEROSOL, METERED RESPIRATORY (INHALATION)
Qty: 3 INHALER | Refills: 1 | Status: SHIPPED | OUTPATIENT
Start: 2019-09-10 | End: 2020-01-27

## 2019-09-10 NOTE — TELEPHONE ENCOUNTER
Was the patient seen in the last year in this department? Yes    Does patient have an active prescription for medications requested? No     Received Request Via: Pharmacy      Pt met protocol?: Yes   Pt last ov 7/2019   BP Readings from Last 1 Encounters:   07/16/19 108/70

## 2019-09-10 NOTE — TELEPHONE ENCOUNTER
Refill X 6 months, sent to pharmacy.Pt. Seen in the last 6 months per protocol.   Lab Results   Component Value Date/Time    SODIUM 134 (L) 07/09/2019 12:51 PM    POTASSIUM 3.8 07/09/2019 12:51 PM    CHLORIDE 99 07/09/2019 12:51 PM    CO2 26 07/09/2019 12:51 PM    GLUCOSE 75 07/09/2019 12:51 PM    BUN 13 07/09/2019 12:51 PM    CREATININE 0.74 07/09/2019 12:51 PM

## 2019-09-11 RX ORDER — BISOPROLOL FUMARATE 10 MG/1
TABLET, FILM COATED ORAL
Qty: 135 TAB | Refills: 1 | Status: SHIPPED | OUTPATIENT
Start: 2019-09-11 | End: 2020-05-27

## 2019-09-11 NOTE — TELEPHONE ENCOUNTER
Refill X 6 months, sent to pharmacy.Pt. Seen in the last 6 months per protocol.   Lab Results   Component Value Date/Time    SODIUM 135 09/10/2019 11:47 AM    POTASSIUM 4.1 09/10/2019 11:47 AM    CHLORIDE 99 09/10/2019 11:47 AM    CO2 28 09/10/2019 11:47 AM    GLUCOSE 97 09/10/2019 11:47 AM    BUN 14 09/10/2019 11:47 AM    CREATININE 0.78 09/10/2019 11:47 AM

## 2019-09-11 NOTE — TELEPHONE ENCOUNTER
*Currently listed as historical on patients med history*  Was the patient seen in the last year in this department? Yes    Does patient have an active prescription for medications requested? No     Received Request Via: Pharmacy    Pt met protocol?: Yes     Last OV 07/16/19    BP Readings from Last 1 Encounters:   07/16/19 108/70

## 2019-10-09 ENCOUNTER — HOSPITAL ENCOUNTER (OUTPATIENT)
Dept: LAB | Facility: MEDICAL CENTER | Age: 69
End: 2019-10-09
Attending: INTERNAL MEDICINE
Payer: MEDICARE

## 2019-10-29 ENCOUNTER — HOSPITAL ENCOUNTER (OUTPATIENT)
Dept: LAB | Facility: MEDICAL CENTER | Age: 69
End: 2019-10-29
Attending: INTERNAL MEDICINE
Payer: MEDICARE

## 2019-10-29 LAB
ALBUMIN SERPL BCP-MCNC: 3.3 G/DL (ref 3.2–4.9)
ALBUMIN/GLOB SERPL: 1 G/DL
ALP SERPL-CCNC: 62 U/L (ref 30–99)
ALT SERPL-CCNC: 128 U/L (ref 2–50)
ANION GAP SERPL CALC-SCNC: 10 MMOL/L (ref 0–11.9)
AST SERPL-CCNC: 103 U/L (ref 12–45)
BASOPHILS # BLD AUTO: 1.1 % (ref 0–1.8)
BASOPHILS # BLD: 0.16 K/UL (ref 0–0.12)
BILIRUB SERPL-MCNC: 0.3 MG/DL (ref 0.1–1.5)
BUN SERPL-MCNC: 18 MG/DL (ref 8–22)
CALCIUM SERPL-MCNC: 8.4 MG/DL (ref 8.5–10.5)
CHLORIDE SERPL-SCNC: 101 MMOL/L (ref 96–112)
CK SERPL-CCNC: 4180 U/L (ref 0–154)
CO2 SERPL-SCNC: 24 MMOL/L (ref 20–33)
CREAT SERPL-MCNC: 0.53 MG/DL (ref 0.5–1.4)
CRP SERPL HS-MCNC: 3.12 MG/DL (ref 0–0.75)
EOSINOPHIL # BLD AUTO: 1.11 K/UL (ref 0–0.51)
EOSINOPHIL NFR BLD: 7.9 % (ref 0–6.9)
ERYTHROCYTE [DISTWIDTH] IN BLOOD BY AUTOMATED COUNT: 48 FL (ref 35.9–50)
ERYTHROCYTE [SEDIMENTATION RATE] IN BLOOD BY WESTERGREN METHOD: 37 MM/HOUR (ref 0–30)
GLOBULIN SER CALC-MCNC: 3.2 G/DL (ref 1.9–3.5)
GLUCOSE SERPL-MCNC: 87 MG/DL (ref 65–99)
HCT VFR BLD AUTO: 42.7 % (ref 37–47)
HGB BLD-MCNC: 14 G/DL (ref 12–16)
IMM GRANULOCYTES # BLD AUTO: 0.11 K/UL (ref 0–0.11)
IMM GRANULOCYTES NFR BLD AUTO: 0.8 % (ref 0–0.9)
LYMPHOCYTES # BLD AUTO: 1.57 K/UL (ref 1–4.8)
LYMPHOCYTES NFR BLD: 11.2 % (ref 22–41)
MCH RBC QN AUTO: 29.4 PG (ref 27–33)
MCHC RBC AUTO-ENTMCNC: 32.8 G/DL (ref 33.6–35)
MCV RBC AUTO: 89.5 FL (ref 81.4–97.8)
MONOCYTES # BLD AUTO: 1 K/UL (ref 0–0.85)
MONOCYTES NFR BLD AUTO: 7.1 % (ref 0–13.4)
NEUTROPHILS # BLD AUTO: 10.11 K/UL (ref 2–7.15)
NEUTROPHILS NFR BLD: 71.9 % (ref 44–72)
NRBC # BLD AUTO: 0 K/UL
NRBC BLD-RTO: 0 /100 WBC
PLATELET # BLD AUTO: 333 K/UL (ref 164–446)
PMV BLD AUTO: 9.2 FL (ref 9–12.9)
POTASSIUM SERPL-SCNC: 4.1 MMOL/L (ref 3.6–5.5)
PROT SERPL-MCNC: 6.5 G/DL (ref 6–8.2)
RBC # BLD AUTO: 4.77 M/UL (ref 4.2–5.4)
SODIUM SERPL-SCNC: 135 MMOL/L (ref 135–145)
WBC # BLD AUTO: 14.1 K/UL (ref 4.8–10.8)

## 2019-10-29 PROCEDURE — 85025 COMPLETE CBC W/AUTO DIFF WBC: CPT

## 2019-10-29 PROCEDURE — 80053 COMPREHEN METABOLIC PANEL: CPT

## 2019-10-29 PROCEDURE — 85652 RBC SED RATE AUTOMATED: CPT

## 2019-10-29 PROCEDURE — 36415 COLL VENOUS BLD VENIPUNCTURE: CPT

## 2019-10-29 PROCEDURE — 86140 C-REACTIVE PROTEIN: CPT

## 2019-10-29 PROCEDURE — 82550 ASSAY OF CK (CPK): CPT

## 2019-11-21 ENCOUNTER — HOSPITAL ENCOUNTER (OUTPATIENT)
Dept: LAB | Facility: MEDICAL CENTER | Age: 69
End: 2019-11-21
Attending: INTERNAL MEDICINE
Payer: MEDICARE

## 2019-11-21 LAB
ALBUMIN SERPL BCP-MCNC: 3.2 G/DL (ref 3.2–4.9)
ALBUMIN/GLOB SERPL: 1 G/DL
ALP SERPL-CCNC: 62 U/L (ref 30–99)
ALT SERPL-CCNC: 157 U/L (ref 2–50)
ANION GAP SERPL CALC-SCNC: 7 MMOL/L (ref 0–11.9)
AST SERPL-CCNC: 88 U/L (ref 12–45)
BASOPHILS # BLD AUTO: 1.5 % (ref 0–1.8)
BASOPHILS # BLD: 0.24 K/UL (ref 0–0.12)
BILIRUB SERPL-MCNC: 0.3 MG/DL (ref 0.1–1.5)
BUN SERPL-MCNC: 15 MG/DL (ref 8–22)
CALCIUM SERPL-MCNC: 8.1 MG/DL (ref 8.5–10.5)
CHLORIDE SERPL-SCNC: 100 MMOL/L (ref 96–112)
CK SERPL-CCNC: 2864 U/L (ref 0–154)
CO2 SERPL-SCNC: 29 MMOL/L (ref 20–33)
CREAT SERPL-MCNC: 0.72 MG/DL (ref 0.5–1.4)
EOSINOPHIL # BLD AUTO: 0.65 K/UL (ref 0–0.51)
EOSINOPHIL NFR BLD: 4.1 % (ref 0–6.9)
ERYTHROCYTE [DISTWIDTH] IN BLOOD BY AUTOMATED COUNT: 52.2 FL (ref 35.9–50)
GLOBULIN SER CALC-MCNC: 3.1 G/DL (ref 1.9–3.5)
GLUCOSE SERPL-MCNC: 78 MG/DL (ref 65–99)
HCT VFR BLD AUTO: 40.3 % (ref 37–47)
HGB BLD-MCNC: 12.8 G/DL (ref 12–16)
IMM GRANULOCYTES # BLD AUTO: 0.27 K/UL (ref 0–0.11)
IMM GRANULOCYTES NFR BLD AUTO: 1.7 % (ref 0–0.9)
LYMPHOCYTES # BLD AUTO: 3.05 K/UL (ref 1–4.8)
LYMPHOCYTES NFR BLD: 19.3 % (ref 22–41)
MCH RBC QN AUTO: 29.2 PG (ref 27–33)
MCHC RBC AUTO-ENTMCNC: 31.8 G/DL (ref 33.6–35)
MCV RBC AUTO: 92 FL (ref 81.4–97.8)
MONOCYTES # BLD AUTO: 1.33 K/UL (ref 0–0.85)
MONOCYTES NFR BLD AUTO: 8.4 % (ref 0–13.4)
NEUTROPHILS # BLD AUTO: 10.3 K/UL (ref 2–7.15)
NEUTROPHILS NFR BLD: 65 % (ref 44–72)
NRBC # BLD AUTO: 0 K/UL
NRBC BLD-RTO: 0 /100 WBC
PLATELET # BLD AUTO: 469 K/UL (ref 164–446)
PMV BLD AUTO: 9 FL (ref 9–12.9)
POTASSIUM SERPL-SCNC: 4.2 MMOL/L (ref 3.6–5.5)
PROT SERPL-MCNC: 6.3 G/DL (ref 6–8.2)
RBC # BLD AUTO: 4.38 M/UL (ref 4.2–5.4)
SODIUM SERPL-SCNC: 136 MMOL/L (ref 135–145)
WBC # BLD AUTO: 15.8 K/UL (ref 4.8–10.8)

## 2019-11-21 PROCEDURE — 36415 COLL VENOUS BLD VENIPUNCTURE: CPT

## 2019-11-21 PROCEDURE — 82550 ASSAY OF CK (CPK): CPT

## 2019-11-21 PROCEDURE — 85025 COMPLETE CBC W/AUTO DIFF WBC: CPT

## 2019-11-21 PROCEDURE — 80053 COMPREHEN METABOLIC PANEL: CPT

## 2019-11-26 ENCOUNTER — OFFICE VISIT (OUTPATIENT)
Dept: MEDICAL GROUP | Facility: PHYSICIAN GROUP | Age: 69
End: 2019-11-26
Payer: MEDICARE

## 2019-11-26 VITALS
BODY MASS INDEX: 28.17 KG/M2 | SYSTOLIC BLOOD PRESSURE: 116 MMHG | HEIGHT: 64 IN | TEMPERATURE: 98.4 F | DIASTOLIC BLOOD PRESSURE: 70 MMHG | RESPIRATION RATE: 14 BRPM | OXYGEN SATURATION: 98 % | WEIGHT: 165 LBS | HEART RATE: 67 BPM

## 2019-11-26 DIAGNOSIS — M13.0 POLYARTHRITIS: ICD-10-CM

## 2019-11-26 DIAGNOSIS — D64.9 NORMOCYTIC ANEMIA: ICD-10-CM

## 2019-11-26 DIAGNOSIS — R09.89 LEFT CAROTID BRUIT: ICD-10-CM

## 2019-11-26 DIAGNOSIS — R79.89 ELEVATED PLATELET COUNT: ICD-10-CM

## 2019-11-26 DIAGNOSIS — D72.829 LEUKOCYTOSIS, UNSPECIFIED TYPE: ICD-10-CM

## 2019-11-26 DIAGNOSIS — E66.3 OVERWEIGHT WITH BODY MASS INDEX (BMI) 25.0-29.9: ICD-10-CM

## 2019-11-26 DIAGNOSIS — M60.9 MYOSITIS OF MULTIPLE SITES, UNSPECIFIED MYOSITIS TYPE: ICD-10-CM

## 2019-11-26 DIAGNOSIS — R53.1 GENERALIZED WEAKNESS: ICD-10-CM

## 2019-11-26 DIAGNOSIS — Z00.00 MEDICARE ANNUAL WELLNESS VISIT, INITIAL: Primary | ICD-10-CM

## 2019-11-26 DIAGNOSIS — L82.1 SEBORRHEIC KERATOSES: ICD-10-CM

## 2019-11-26 DIAGNOSIS — E78.2 MIXED HYPERLIPIDEMIA: ICD-10-CM

## 2019-11-26 DIAGNOSIS — F41.9 ANXIETY: ICD-10-CM

## 2019-11-26 DIAGNOSIS — F17.200 TOBACCO DEPENDENCE: ICD-10-CM

## 2019-11-26 DIAGNOSIS — I10 ESSENTIAL HYPERTENSION: ICD-10-CM

## 2019-11-26 DIAGNOSIS — M67.431 GANGLION CYST OF WRIST, RIGHT: ICD-10-CM

## 2019-11-26 DIAGNOSIS — G47.00 FREQUENT NOCTURNAL AWAKENING: ICD-10-CM

## 2019-11-26 DIAGNOSIS — I25.10 CORONARY ARTERY DISEASE INVOLVING NATIVE CORONARY ARTERY OF NATIVE HEART WITHOUT ANGINA PECTORIS: ICD-10-CM

## 2019-11-26 DIAGNOSIS — G47.33 OSA (OBSTRUCTIVE SLEEP APNEA): ICD-10-CM

## 2019-11-26 DIAGNOSIS — M62.82 NON-TRAUMATIC RHABDOMYOLYSIS: ICD-10-CM

## 2019-11-26 DIAGNOSIS — R21 RASH: ICD-10-CM

## 2019-11-26 DIAGNOSIS — J45.20 MILD INTERMITTENT ASTHMA WITHOUT COMPLICATION: ICD-10-CM

## 2019-11-26 DIAGNOSIS — R74.01 TRANSAMINITIS: ICD-10-CM

## 2019-11-26 PROBLEM — R60.0 PERIPHERAL EDEMA: Status: RESOLVED | Noted: 2019-01-19 | Resolved: 2019-11-26

## 2019-11-26 PROBLEM — R60.9 PERIPHERAL EDEMA: Status: RESOLVED | Noted: 2019-01-19 | Resolved: 2019-11-26

## 2019-11-26 PROBLEM — M25.561 CHRONIC PAIN OF RIGHT KNEE: Status: RESOLVED | Noted: 2018-06-14 | Resolved: 2019-11-26

## 2019-11-26 PROBLEM — G89.29 CHRONIC PAIN OF RIGHT KNEE: Status: RESOLVED | Noted: 2018-06-14 | Resolved: 2019-11-26

## 2019-11-26 PROBLEM — R00.2 PALPITATIONS: Status: RESOLVED | Noted: 2019-02-28 | Resolved: 2019-11-26

## 2019-11-26 PROBLEM — H93.8X1 SENSATION OF FULLNESS IN RIGHT EAR: Status: RESOLVED | Noted: 2019-02-22 | Resolved: 2019-11-26

## 2019-11-26 PROCEDURE — G0438 PPPS, INITIAL VISIT: HCPCS | Performed by: NURSE PRACTITIONER

## 2019-11-26 RX ORDER — EPINEPHRINE 0.3 MG/.3ML
INJECTION SUBCUTANEOUS
COMMUNITY
Start: 2019-10-09 | End: 2020-05-19

## 2019-11-26 RX ORDER — AZATHIOPRINE 50 MG/1
100 TABLET ORAL
Refills: 3 | COMMUNITY
Start: 2019-11-08 | End: 2019-12-23

## 2019-11-26 RX ORDER — IMMUNE GLOBULIN INFUSION (HUMAN) 100 MG/ML
INJECTION, SOLUTION INTRAVENOUS; SUBCUTANEOUS
COMMUNITY
Start: 2019-10-08 | End: 2019-11-26

## 2019-11-26 ASSESSMENT — PATIENT HEALTH QUESTIONNAIRE - PHQ9: CLINICAL INTERPRETATION OF PHQ2 SCORE: 0

## 2019-11-26 ASSESSMENT — ACTIVITIES OF DAILY LIVING (ADL): BATHING_REQUIRES_ASSISTANCE: 0

## 2019-11-26 ASSESSMENT — ENCOUNTER SYMPTOMS: GENERAL WELL-BEING: FAIR

## 2019-11-26 NOTE — ASSESSMENT & PLAN NOTE
Patient continues to smoke, albeit not every day and smokes very little cigarettes.  She does understand the increased risks associated with this.

## 2019-11-26 NOTE — ASSESSMENT & PLAN NOTE
This is a chronic condition, associated with myositis of multiple sites, closely followed by rheumatology.

## 2019-11-26 NOTE — ASSESSMENT & PLAN NOTE
Chronic, stable, well controlled with as needed use of albuterol.  Recent exacerbation of her asthma.

## 2019-11-26 NOTE — ASSESSMENT & PLAN NOTE
See additional notes, closely followed by rheumatology at this time.  CPK levels as well as liver enzymes are closely monitored.

## 2019-11-26 NOTE — ASSESSMENT & PLAN NOTE
Patient has left carotid bruit and is now followed by vascular medicine for carotid artery disease.

## 2019-11-26 NOTE — ASSESSMENT & PLAN NOTE
Chronic and stable, well controlled on medication, tolerates medications well with no significant or bothersome side effects.

## 2019-11-26 NOTE — PROGRESS NOTES
Subjective:      Vane Ritter is a 69 y.o. female who presents with Annual Exam        Anxiety  This is a chronic and stable condition, not currently taking medication for this and does not feel she needs to start anything.  She has been tried on escitalopram in the past but this made her too tired and she is not interested in starting any new medications.    Coronary artery disease involving native coronary artery of native heart without angina pectoris  This is a chronic and stable condition, closely followed by cardiology.    Elevated platelet count  Patient continues to have sporadic elevation in her platelet count, most recent CBC showed a very mild increase we will continue to monitor this.    Essential hypertension  Chronic and stable, well controlled on medication, tolerates medications well with no significant or bothersome side effects.    Frequent nocturnal awakening  Patient does continue to wake several times during the night mostly related to having to urinate but she also has been diagnosed with CPAP but adamantly refuses to use.    Ganglion cyst of wrist, right  This is chronic, stable and unchanged, declines referral to hand specialist.    Generalized weakness  This is a chronic condition, associated with myositis of multiple sites, closely followed by rheumatology.    Left carotid bruit  Patient has left carotid bruit and is now followed by vascular medicine for carotid artery disease.    Leukocytosis  Patient does have chronic leukocytosis likely related to chronic steroid use.    Mild intermittent asthma without complication  Chronic, stable, well controlled with as needed use of albuterol.  Recent exacerbation of her asthma.    Mixed hyperlipidemia  This is chronic and uncontrolled, unable to take statin secondary to severe myositis of multiple sites likely from a rhabdomyolysis associated with statins.  She is followed by vascular medicine as well as cardiology, does decline a  "referral to the lipid clinic and is adamant about not starting any new medications for her cholesterol.  She will continue to work on lifestyle modifications.    Myositis of multiple sites  See additional notes, closely followed by rheumatology at this time.  CPK levels as well as liver enzymes are closely monitored.    Non-traumatic rhabdomyolysis  See additional notes, closely followed by rheumatology.    Normocytic anemia  This is a chronic condition likely associated with her multiple chronic comorbidities.  This is closely monitored by her rheumatologist.    AZAM (obstructive sleep apnea)  Chronic and uncontrolled, adamant about not wearing CPAP.  She understands the risks of not treating AZAM.    Overweight with body mass index (BMI) 25.0-29.9  Chronic and stable, she continues to work on this.    Polyarthritis  Chronic, stable, closely followed by rheumatology, she continues on Plaquenil.    Rash  This is a chronic condition, has been told likely related to 1 of her new medications prescribed by rheumatology.    Seborrheic keratoses  This is chronic, stable and essentially unchanged.    Tobacco dependence  Patient continues to smoke, albeit not every day and smokes very little cigarettes.  She does understand the increased risks associated with this.    Transaminitis  This is a chronic condition, labs are closely monitored.              Objective:     /70   Pulse 67   Temp 36.9 °C (98.4 °F) (Temporal)   Resp 14   Ht 1.613 m (5' 3.5\")   Wt 74.8 kg (165 lb)   SpO2 98%   BMI 28.77 kg/m²              Assessment/Plan:     Vane was seen today for annual exam.    Diagnoses and all orders for this visit:    Medicare annual wellness visit, initial  -     Initial Annual Wellness Visit - Includes PPPS ()    Essential hypertension  -     Initial Annual Wellness Visit - Includes PPPS ()    Mixed hyperlipidemia  -     Initial Annual Wellness Visit - Includes PPPS ()  -     Lipid Profile; " Future    Overweight with body mass index (BMI) 25.0-29.9  -     Initial Annual Wellness Visit - Includes PPPS ()    Mild intermittent asthma without complication  -     Initial Annual Wellness Visit - Includes PPPS ()    Anxiety  -     Initial Annual Wellness Visit - Includes PPPS ()    Seborrheic keratoses  -     Initial Annual Wellness Visit - Includes PPPS ()    Polyarthritis  -     Initial Annual Wellness Visit - Includes PPPS ()    Ganglion cyst of wrist, right  -     Initial Annual Wellness Visit - Includes PPPS ()    Transaminitis  -     Initial Annual Wellness Visit - Includes PPPS ()    Coronary artery disease involving native coronary artery of native heart without angina pectoris  -     Initial Annual Wellness Visit - Includes PPPS ()    Elevated platelet count  -     Initial Annual Wellness Visit - Includes PPPS ()    Myositis of multiple sites, unspecified myositis type  -     Initial Annual Wellness Visit - Includes PPPS ()    Leukocytosis, unspecified type  -     Initial Annual Wellness Visit - Includes PPPS ()    Tobacco dependence  -     Initial Annual Wellness Visit - Includes PPPS ()    Non-traumatic rhabdomyolysis  -     Initial Annual Wellness Visit - Includes PPPS ()    Normocytic anemia  -     Initial Annual Wellness Visit - Includes PPPS ()    Frequent nocturnal awakening  -     Initial Annual Wellness Visit - Includes PPPS ()    Generalized weakness  -     Initial Annual Wellness Visit - Includes PPPS ()    Left carotid bruit  -     Initial Annual Wellness Visit - Includes PPPS ()    Rash  -     Initial Annual Wellness Visit - Includes PPPS ()    AZAM (obstructive sleep apnea)  -     Initial Annual Wellness Visit - Includes PPPS ()      There are no diagnoses linked to this encounter.    Chief Complaint   Patient presents with   • Annual Exam         HPI:  Vane Ritter is a 69 y.o.  here for Medicare Annual Wellness Visit     Patient Active Problem List    Diagnosis Date Noted   • Myositis of multiple sites 02/06/2019     Priority: High   • Polyarthritis 12/13/2018     Priority: High   • Medicare annual wellness visit, initial 11/26/2019   • AZAM (obstructive sleep apnea) 03/28/2019   • Rash 03/20/2019   • Generalized weakness 02/28/2019   • Left carotid bruit 02/28/2019   • Frequent nocturnal awakening 02/22/2019   • Non-traumatic rhabdomyolysis 02/07/2019   • Normocytic anemia 02/07/2019   • Leukocytosis 02/06/2019   • Tobacco dependence 02/06/2019   • Elevated platelet count 02/05/2019   • Hospital discharge follow-up 02/05/2019   • Coronary artery disease involving native coronary artery of native heart without angina pectoris 01/29/2019   • Transaminitis 01/16/2019   • Ganglion cyst of wrist, right 12/13/2018   • Seborrheic keratoses 06/14/2018   • Mild intermittent asthma without complication 04/10/2018   • Anxiety 04/10/2018   • Essential hypertension 03/24/2017   • Mixed hyperlipidemia 03/24/2017   • Overweight with body mass index (BMI) 25.0-29.9 03/24/2017       Current Outpatient Medications   Medication Sig Dispense Refill   • azaTHIOprine (IMURAN) 50 MG Tab Take 100 mg by mouth.  3   • EPINEPHrine (EPIPEN) 0.3 MG/0.3ML Solution Auto-injector solution for injection      • bisoprolol (ZEBETA) 10 MG tablet TAKE 1 & 1/2 (ONE & ONE-HALF) TABLETS BY MOUTH ONCE DAILY 135 Tab 1   • albuterol 108 (90 Base) MCG/ACT Aero Soln inhalation aerosol INHALE 2 PUFFS BY MOUTH EVERY 4 HOURS AS NEEDED FOR SHORTNESS OF BREATH 3 Inhaler 1   • hydroxychloroquine (PLAQUENIL) 200 MG Tab Take 1 Tab by mouth every day. 30 Tab 1   • Omega-3 1000 MG Cap Take  by mouth.     • Multiple Vitamins-Minerals (MULTIVITAMIN ADULT PO) Take  by mouth.     • predniSONE (DELTASONE) 10 MG Tab 50 mg x 1 day; 40 mg x 1 day; 30 mg x 1 day; 20 mg x 1 day; 10 mg x 1 day 1 Quantity Sufficient 0   • aspirin 81 MG tablet Take 1 Tab  by mouth every evening. 30 Tab 11   • fluticasone (FLONASE) 50 MCG/ACT nasal spray Spray 1 Spray in nose every day. 16 g 0     No current facility-administered medications for this visit.             Current supplements as per medication list.       Allergies: Lisinopril and Statins [hmg-coa-r inhibitors]    Current social contact/activities: works 3 days a week, takes care of spouse, otherwise not too much social interactions. Used to do stained glass artwork     She  reports that she has been smoking cigarettes. She has a 17.50 pack-year smoking history. She has never used smokeless tobacco. She reports current alcohol use of about 2.4 oz of alcohol per week. She reports that she does not use drugs.  Ready to quit: Yes  Counseling given: Yes      DPA/Advanced Directive:  Patient has Durable Power of , but it is not on file. Instructed to bring in a copy to scan into their chart.    ROS:    Gait: Uses no assistive device  Ostomy: No  Other tubes: No  Amputations: No  Chronic oxygen use: No  Last eye exam: Unknown  Wears hearing aids: No   : Reports urinary leakage during the last 6 months that has not interfered at all with their daily activities or sleep.    Screening:    Depression Screening    Little interest or pleasure in doing things?  0 - not at all  Feeling down, depressed , or hopeless? 0 - not at all  Patient Health Questionnaire Score: 0     If depressive symptoms identified deferred to follow up visit unless specifically addressed in assessment and plan.    Interpretation of PHQ-9 Total Score   Score Severity   1-4 No Depression   5-9 Mild Depression   10-14 Moderate Depression   15-19 Moderately Severe Depression   20-27 Severe Depression    Screening for Cognitive Impairment    Three Minute Recall (village, kitchen, baby) 3/3    Leo clock face with all 12 numbers and set the hands to show 10 past 10.  Yes    Cognitive concerns identified deferred for follow up unless specifically addressed  in assessment and plan.    Fall Risk Assessment    Has the patient had two or more falls in the last year or any fall with injury in the last year?  No    Safety Assessment    Throw rugs on floor.  No  Handrails on all stairs.  Yes  Good lighting in all hallways.  Yes  Difficulty hearing.  No  Patient counseled about all safety risks that were identified.    Functional Assessment ADLs    Are there any barriers preventing you from cooking for yourself or meeting nutritional needs?  No.    Are there any barriers preventing you from driving safely or obtaining transportation?  No.    Are there any barriers preventing you from using a telephone or calling for help?  No.    Are there any barriers preventing you from shopping?  No.    Are there any barriers preventing you from taking care of your own finances?  No.    Are there any barriers preventing you from managing your medications?  No.    Are there any barriers preventing you from showering, bathing or dressing yourself?  No.    Are you currently engaging in any exercise or physical activity?  Yes.     What is your perception of your health?  Fair.      Health Maintenance Summary                IMM ZOSTER VACCINES Postponed 12/2/2019 Originally 9/20/2002. Patient Refused    COLONOSCOPY Postponed 12/11/2019 Originally 9/20/2002. Patient Refused    IMM INFLUENZA Postponed 11/26/2020 Originally 9/1/2019. Patient Refused    IMM PNEUMOCOCCAL VACCINE: 65+ Years Postponed 11/26/2020 Originally 9/20/2017. Patient Refused    MAMMOGRAM Next Due 9/5/2020      Done 9/5/2019 MA-SCREENING MAMMO BILAT W/TOMOSYNTHESIS W/CAD     Patient has more history with this topic...    BONE DENSITY Next Due 9/5/2024      Done 9/5/2019 DS-BONE DENSITY STUDY (DEXA)    IMM DTaP/Tdap/Td Vaccine Next Due 6/14/2028      Done 6/14/2018 Imm Admin: Tdap Vaccine          Patient Care Team:  FUAD Garnica as PCP - General (Family Medicine)        Social History     Tobacco Use   • Smoking  "status: Current Some Day Smoker     Packs/day: 0.50     Years: 35.00     Pack years: 17.50     Types: Cigarettes   • Smokeless tobacco: Never Used   Substance Use Topics   • Alcohol use: Yes     Alcohol/week: 2.4 oz     Types: 4 Cans of beer per week     Comment: occ   • Drug use: No     Family History   Problem Relation Age of Onset   • Stroke Mother    • Stroke Father      She  has a past medical history of Bronchitis, Heart attack (HCC), and Hypertension.   Past Surgical History:   Procedure Laterality Date   • CYST EXCISION      right ankle, RA nodule    • OPEN REDUCTION     • STENT PLACEMENT      2009   • TUBAL COAGULATION LAPAROSCOPIC BILATERAL         Exam:   /70   Pulse 67   Temp 36.9 °C (98.4 °F) (Temporal)   Resp 14   Ht 1.613 m (5' 3.5\")   Wt 74.8 kg (165 lb)   SpO2 98%  Body mass index is 28.77 kg/m².    Hearing good.    Dentition good  Alert, oriented in no acute distress.  Eye contact is good, speech goal directed, affect calm    Assessment and Plan. The following treatment and monitoring plan is recommended:    1. Medicare annual wellness visit, initial  Initial Annual Wellness Visit - Includes PPPS ()   2. Essential hypertension  Initial Annual Wellness Visit - Includes PPPS ()   3. Mixed hyperlipidemia  Initial Annual Wellness Visit - Includes PPPS ()    Lipid Profile   4. Overweight with body mass index (BMI) 25.0-29.9  Initial Annual Wellness Visit - Includes PPPS ()   5. Mild intermittent asthma without complication  Initial Annual Wellness Visit - Includes PPPS ()   6. Anxiety  Initial Annual Wellness Visit - Includes PPPS ()   7. Seborrheic keratoses  Initial Annual Wellness Visit - Includes PPPS ()   8. Polyarthritis  Initial Annual Wellness Visit - Includes PPPS ()   9. Ganglion cyst of wrist, right  Initial Annual Wellness Visit - Includes PPPS ()   10. Transaminitis  Initial Annual Wellness Visit - Includes PPPS ()   11. Coronary " artery disease involving native coronary artery of native heart without angina pectoris  Initial Annual Wellness Visit - Includes PPPS ()   12. Elevated platelet count  Initial Annual Wellness Visit - Includes PPPS ()   13. Myositis of multiple sites, unspecified myositis type  Initial Annual Wellness Visit - Includes PPPS ()   14. Leukocytosis, unspecified type  Initial Annual Wellness Visit - Includes PPPS ()   15. Tobacco dependence  Initial Annual Wellness Visit - Includes PPPS ()   16. Non-traumatic rhabdomyolysis  Initial Annual Wellness Visit - Includes PPPS ()   17. Normocytic anemia  Initial Annual Wellness Visit - Includes PPPS ()   18. Frequent nocturnal awakening  Initial Annual Wellness Visit - Includes PPPS ()   19. Generalized weakness  Initial Annual Wellness Visit - Includes PPPS ()   20. Left carotid bruit  Initial Annual Wellness Visit - Includes PPPS ()   21. Rash  Initial Annual Wellness Visit - Includes PPPS ()   22. AZAM (obstructive sleep apnea)  Initial Annual Wellness Visit - Includes PPPS ()     Vane was seen today for annual exam.    Diagnoses and all orders for this visit:    Medicare annual wellness visit, initial  -     Initial Annual Wellness Visit - Includes PPPS ()    Essential hypertension  -     Initial Annual Wellness Visit - Includes PPPS ()    Mixed hyperlipidemia  -     Initial Annual Wellness Visit - Includes PPPS ()  -     Lipid Profile; Future    Overweight with body mass index (BMI) 25.0-29.9  -     Initial Annual Wellness Visit - Includes PPPS ()    Mild intermittent asthma without complication  -     Initial Annual Wellness Visit - Includes PPPS ()    Anxiety  -     Initial Annual Wellness Visit - Includes PPPS ()    Seborrheic keratoses  -     Initial Annual Wellness Visit - Includes PPPS ()    Polyarthritis  -     Initial Annual Wellness Visit - Includes PPPS  ()    Ganglion cyst of wrist, right  -     Initial Annual Wellness Visit - Includes PPPS ()    Transaminitis  -     Initial Annual Wellness Visit - Includes PPPS ()    Coronary artery disease involving native coronary artery of native heart without angina pectoris  -     Initial Annual Wellness Visit - Includes PPPS ()    Elevated platelet count  -     Initial Annual Wellness Visit - Includes PPPS ()    Myositis of multiple sites, unspecified myositis type  -     Initial Annual Wellness Visit - Includes PPPS ()    Leukocytosis, unspecified type  -     Initial Annual Wellness Visit - Includes PPPS ()    Tobacco dependence  -     Initial Annual Wellness Visit - Includes PPPS ()    Non-traumatic rhabdomyolysis  -     Initial Annual Wellness Visit - Includes PPPS ()    Normocytic anemia  -     Initial Annual Wellness Visit - Includes PPPS ()    Frequent nocturnal awakening  -     Initial Annual Wellness Visit - Includes PPPS ()    Generalized weakness  -     Initial Annual Wellness Visit - Includes PPPS ()    Left carotid bruit  -     Initial Annual Wellness Visit - Includes PPPS ()    Rash  -     Initial Annual Wellness Visit - Includes PPPS ()    AZAM (obstructive sleep apnea)  -     Initial Annual Wellness Visit - Includes PPPS ()          Services suggested: No services needed at this time  Health Care Screening: Age-appropriate preventive services recommended by USPTF and ACIP covered by Medicare were discussed today. Services ordered if indicated and agreed upon by the patient.  Referrals offered: Community-based lifestyle interventions to reduce health risks and promote self-management and wellness, fall prevention, nutrition, physical activity, tobacco-use cessation, weight loss, and mental health services as per orders if indicated.    Discussion today about general wellness and lifestyle habits:    · Prevent falls and reduce trip  hazards; Cautioned about securing or removing rugs.  · Have a working fire alarm and carbon monoxide detector;   · Engage in regular physical activity and social activities     Follow-up: Return in about 6 months (around 5/26/2020) for Follow-up.

## 2019-11-26 NOTE — ASSESSMENT & PLAN NOTE
This is chronic and uncontrolled, unable to take statin secondary to severe myositis of multiple sites likely from a rhabdomyolysis associated with statins.  She is followed by vascular medicine as well as cardiology, does decline a referral to the lipid clinic and is adamant about not starting any new medications for her cholesterol.  She will continue to work on lifestyle modifications.

## 2019-11-26 NOTE — PROGRESS NOTES
Chief Complaint   Patient presents with   • Annual Exam         This is a 69 y.o.female patient that presents today with the following:***    No problem-specific Assessment & Plan notes found for this encounter.      Hospital Outpatient Visit on 11/21/2019   Component Date Value   • WBC 11/21/2019 15.8*   • RBC 11/21/2019 4.38    • Hemoglobin 11/21/2019 12.8    • Hematocrit 11/21/2019 40.3    • MCV 11/21/2019 92.0    • MCH 11/21/2019 29.2    • MCHC 11/21/2019 31.8*   • RDW 11/21/2019 52.2*   • Platelet Count 11/21/2019 469*   • MPV 11/21/2019 9.0    • Neutrophils-Polys 11/21/2019 65.00    • Lymphocytes 11/21/2019 19.30*   • Monocytes 11/21/2019 8.40    • Eosinophils 11/21/2019 4.10    • Basophils 11/21/2019 1.50    • Immature Granulocytes 11/21/2019 1.70*   • Nucleated RBC 11/21/2019 0.00    • Neutrophils (Absolute) 11/21/2019 10.30*   • Lymphs (Absolute) 11/21/2019 3.05    • Monos (Absolute) 11/21/2019 1.33*   • Eos (Absolute) 11/21/2019 0.65*   • Baso (Absolute) 11/21/2019 0.24*   • Immature Granulocytes (a* 11/21/2019 0.27*   • NRBC (Absolute) 11/21/2019 0.00    • GFR If  11/21/2019 >60    • GFR If Non  [facility]meri* 11/21/2019 >60    • Sodium 11/21/2019 136    • Potassium 11/21/2019 4.2    • Chloride 11/21/2019 100    • Co2 11/21/2019 29    • Anion Gap 11/21/2019 7.0    • Glucose 11/21/2019 78    • Bun 11/21/2019 15    • Creatinine 11/21/2019 0.72    • Calcium 11/21/2019 8.1*   • AST(SGOT) 11/21/2019 88*   • ALT(SGPT) 11/21/2019 157*   • Alkaline Phosphatase 11/21/2019 62    • Total Bilirubin 11/21/2019 0.3    • Albumin 11/21/2019 3.2    • Total Protein 11/21/2019 6.3    • Globulin 11/21/2019 3.1    • A-G Ratio 11/21/2019 1.0    • CPK Total 11/21/2019 2864*   Hospital Outpatient Visit on 10/29/2019   Component Date Value   • GFR If  10/29/2019 >60    • GFR If Non  [facility]i* 10/29/2019 >60    • WBC 10/29/2019 14.1*   • RBC 10/29/2019 4.77    • Hemoglobin 10/29/2019 14.0    •  Hematocrit 10/29/2019 42.7    • MCV 10/29/2019 89.5    • MCH 10/29/2019 29.4    • MCHC 10/29/2019 32.8*   • RDW 10/29/2019 48.0    • Platelet Count 10/29/2019 333    • MPV 10/29/2019 9.2    • Neutrophils-Polys 10/29/2019 71.90    • Lymphocytes 10/29/2019 11.20*   • Monocytes 10/29/2019 7.10    • Eosinophils 10/29/2019 7.90*   • Basophils 10/29/2019 1.10    • Immature Granulocytes 10/29/2019 0.80    • Nucleated RBC 10/29/2019 0.00    • Neutrophils (Absolute) 10/29/2019 10.11*   • Lymphs (Absolute) 10/29/2019 1.57    • Monos (Absolute) 10/29/2019 1.00*   • Eos (Absolute) 10/29/2019 1.11*   • Baso (Absolute) 10/29/2019 0.16*   • Immature Granulocytes (a* 10/29/2019 0.11    • NRBC (Absolute) 10/29/2019 0.00    • Sed Rate Westergren 10/29/2019 37*   • Stat C-Reactive Protein 10/29/2019 3.12*   • Sodium 10/29/2019 135    • Potassium 10/29/2019 4.1    • Chloride 10/29/2019 101    • Co2 10/29/2019 24    • Anion Gap 10/29/2019 10.0    • Glucose 10/29/2019 87    • Bun 10/29/2019 18    • Creatinine 10/29/2019 0.53    • Calcium 10/29/2019 8.4*   • AST(SGOT) 10/29/2019 103*   • ALT(SGPT) 10/29/2019 128*   • Alkaline Phosphatase 10/29/2019 62    • Total Bilirubin 10/29/2019 0.3    • Albumin 10/29/2019 3.3    • Total Protein 10/29/2019 6.5    • Globulin 10/29/2019 3.2    • A-G Ratio 10/29/2019 1.0    • CPK Total 10/29/2019 4180*         {HPI COURSE:11962}    Past Medical History:   Diagnosis Date   • Bronchitis    • Heart attack (HCC)     2009    • Hypertension        Past Surgical History:   Procedure Laterality Date   • CYST EXCISION      right ankle, RA nodule    • OPEN REDUCTION     • STENT PLACEMENT      2009   • TUBAL COAGULATION LAPAROSCOPIC BILATERAL         Family History   Problem Relation Age of Onset   • Stroke Mother    • Stroke Father        Lisinopril and Statins [hmg-coa-r inhibitors]    Current Outpatient Medications Ordered in Epic   Medication Sig Dispense Refill   • azaTHIOprine (IMURAN) 50 MG Tab Take 100 mg  "by mouth.  3   • EPINEPHrine (EPIPEN) 0.3 MG/0.3ML Solution Auto-injector solution for injection      • bisoprolol (ZEBETA) 10 MG tablet TAKE 1 & 1/2 (ONE & ONE-HALF) TABLETS BY MOUTH ONCE DAILY 135 Tab 1   • albuterol 108 (90 Base) MCG/ACT Aero Soln inhalation aerosol INHALE 2 PUFFS BY MOUTH EVERY 4 HOURS AS NEEDED FOR SHORTNESS OF BREATH 3 Inhaler 1   • folic acid (FOLVITE) 1 MG Tab   6   • hydroxychloroquine (PLAQUENIL) 200 MG Tab Take 1 Tab by mouth every day. 30 Tab 1   • Omega-3 1000 MG Cap Take  by mouth.     • Multiple Vitamins-Minerals (MULTIVITAMIN ADULT PO) Take  by mouth.     • predniSONE (DELTASONE) 10 MG Tab 50 mg x 1 day; 40 mg x 1 day; 30 mg x 1 day; 20 mg x 1 day; 10 mg x 1 day 1 Quantity Sufficient 0   • aspirin 81 MG tablet Take 1 Tab by mouth every evening. 30 Tab 11   • fluticasone (FLONASE) 50 MCG/ACT nasal spray Spray 1 Spray in nose every day. 16 g 0   • amLODIPine (NORVASC) 5 MG Tab Take 2 Tabs by mouth every day. 60 Tab 1   • immune globulin (GAMMAGARD) 10 GM/100ML Solution infusion      • hydroCHLOROthiazide (HYDRODIURIL) 12.5 MG tablet TAKE 1 TABLET BY MOUTH ONCE DAILY. 90 Tab 1   • methotrexate 2.5 MG tablet   3   • hydrOXYzine HCl (ATARAX) 25 MG Tab Take 1 Tab by mouth 3 times a day as needed for Itching. 60 Tab 1     No current Epic-ordered facility-administered medications on file.        {ROS:11159}    Physical exam:  Ht 1.613 m (5' 3.5\")   Wt 74.8 kg (165 lb)   BMI 28.77 kg/m²   {PHYSICAL EXAM CHOICES:82804}    Medical decision making/discussion: ***    There are no diagnoses linked to this encounter.    No follow-ups on file.        Please note that this dictation was created using voice recognition software. I have made every reasonable attempt to correct obvious errors, but I expect that there are errors of grammar and possibly content that I did not discover before finalizing the note.        "

## 2019-11-26 NOTE — ASSESSMENT & PLAN NOTE
This is a chronic condition likely associated with her multiple chronic comorbidities.  This is closely monitored by her rheumatologist.

## 2019-11-26 NOTE — ASSESSMENT & PLAN NOTE
Patient does continue to wake several times during the night mostly related to having to urinate but she also has been diagnosed with CPAP but adamantly refuses to use.

## 2019-11-26 NOTE — ASSESSMENT & PLAN NOTE
Patient continues to have sporadic elevation in her platelet count, most recent CBC showed a very mild increase we will continue to monitor this.

## 2019-11-26 NOTE — ASSESSMENT & PLAN NOTE
This is a chronic condition, has been told likely related to 1 of her new medications prescribed by rheumatology.

## 2019-11-26 NOTE — ASSESSMENT & PLAN NOTE
This is a chronic and stable condition, not currently taking medication for this and does not feel she needs to start anything.  She has been tried on escitalopram in the past but this made her too tired and she is not interested in starting any new medications.

## 2019-12-03 ENCOUNTER — APPOINTMENT (OUTPATIENT)
Dept: ONCOLOGY | Facility: MEDICAL CENTER | Age: 69
End: 2019-12-03
Attending: INTERNAL MEDICINE
Payer: MEDICARE

## 2019-12-03 ENCOUNTER — HOSPITAL ENCOUNTER (OUTPATIENT)
Dept: LAB | Facility: MEDICAL CENTER | Age: 69
End: 2019-12-03
Attending: NURSE PRACTITIONER
Payer: MEDICARE

## 2019-12-03 ENCOUNTER — HOSPITAL ENCOUNTER (OUTPATIENT)
Dept: LAB | Facility: MEDICAL CENTER | Age: 69
End: 2019-12-03
Attending: INTERNAL MEDICINE
Payer: MEDICARE

## 2019-12-03 DIAGNOSIS — M06.09 RHEUMATOID ARTHRITIS OF MULTIPLE SITES WITHOUT RHEUMATOID FACTOR (HCC): ICD-10-CM

## 2019-12-03 DIAGNOSIS — Z11.59 NEED FOR HEPATITIS B SCREENING TEST: ICD-10-CM

## 2019-12-03 DIAGNOSIS — E78.2 MIXED HYPERLIPIDEMIA: ICD-10-CM

## 2019-12-03 LAB
CHOLEST SERPL-MCNC: 277 MG/DL (ref 100–199)
FASTING STATUS PATIENT QL REPORTED: NORMAL
HBV CORE AB SERPL QL IA: NEGATIVE
HBV SURFACE AB SERPL IA-ACNC: 92.52 MIU/ML (ref 0–10)
HDLC SERPL-MCNC: 44 MG/DL
LDLC SERPL CALC-MCNC: 185 MG/DL
TRIGL SERPL-MCNC: 242 MG/DL (ref 0–149)

## 2019-12-03 PROCEDURE — 87340 HEPATITIS B SURFACE AG IA: CPT

## 2019-12-03 PROCEDURE — 86704 HEP B CORE ANTIBODY TOTAL: CPT | Mod: GA

## 2019-12-03 PROCEDURE — 80061 LIPID PANEL: CPT

## 2019-12-03 PROCEDURE — 86706 HEP B SURFACE ANTIBODY: CPT | Mod: GA

## 2019-12-03 NOTE — PROGRESS NOTES
"Pt will need updated Hepatitis B screening prior to starting Rituxan infusions. RN contacted pt and she verbalized understanding. Orders placed for pt to have labs drawn per pharmacy protocol in [facility] lab. Pt reported that she has a \"full body rash that burns\" and has been seen by PCP, rash has not improved since before Thanksgiving. This RN updated Dr. Bond office and they will be contacting the pt and will contact Infusion center with the date the pt should be rescheduled for treatment.   "

## 2019-12-04 DIAGNOSIS — M06.09 RHEUMATOID ARTHRITIS OF MULTIPLE SITES WITHOUT RHEUMATOID FACTOR (HCC): ICD-10-CM

## 2019-12-04 DIAGNOSIS — Z11.59 NEED FOR HEPATITIS B SCREENING TEST: ICD-10-CM

## 2019-12-04 LAB — HBV SURFACE AG SER QL: NEGATIVE

## 2019-12-09 ENCOUNTER — OUTPATIENT INFUSION SERVICES (OUTPATIENT)
Dept: ONCOLOGY | Facility: MEDICAL CENTER | Age: 69
End: 2019-12-09
Attending: INTERNAL MEDICINE
Payer: MEDICARE

## 2019-12-09 VITALS
WEIGHT: 169.09 LBS | BODY MASS INDEX: 28.87 KG/M2 | RESPIRATION RATE: 18 BRPM | HEART RATE: 68 BPM | SYSTOLIC BLOOD PRESSURE: 125 MMHG | OXYGEN SATURATION: 96 % | TEMPERATURE: 97.5 F | HEIGHT: 64 IN | DIASTOLIC BLOOD PRESSURE: 52 MMHG

## 2019-12-09 PROCEDURE — 96413 CHEMO IV INFUSION 1 HR: CPT

## 2019-12-09 PROCEDURE — 700111 HCHG RX REV CODE 636 W/ 250 OVERRIDE (IP): Performed by: INTERNAL MEDICINE

## 2019-12-09 PROCEDURE — 96375 TX/PRO/DX INJ NEW DRUG ADDON: CPT

## 2019-12-09 PROCEDURE — 700102 HCHG RX REV CODE 250 W/ 637 OVERRIDE(OP): Performed by: INTERNAL MEDICINE

## 2019-12-09 PROCEDURE — 96415 CHEMO IV INFUSION ADDL HR: CPT

## 2019-12-09 PROCEDURE — A9270 NON-COVERED ITEM OR SERVICE: HCPCS | Performed by: INTERNAL MEDICINE

## 2019-12-09 PROCEDURE — 700105 HCHG RX REV CODE 258: Performed by: INTERNAL MEDICINE

## 2019-12-09 RX ORDER — METHYLPREDNISOLONE SODIUM SUCCINATE 125 MG/2ML
100 INJECTION, POWDER, LYOPHILIZED, FOR SOLUTION INTRAMUSCULAR; INTRAVENOUS ONCE
Status: COMPLETED | OUTPATIENT
Start: 2019-12-09 | End: 2019-12-09

## 2019-12-09 RX ORDER — ACETAMINOPHEN 325 MG/1
650 TABLET ORAL ONCE
Status: COMPLETED | OUTPATIENT
Start: 2019-12-09 | End: 2019-12-09

## 2019-12-09 RX ORDER — DIPHENHYDRAMINE HCL 25 MG
25 TABLET ORAL ONCE
Status: COMPLETED | OUTPATIENT
Start: 2019-12-09 | End: 2019-12-09

## 2019-12-09 RX ADMIN — DIPHENHYDRAMINE HCL 25 MG: 25 TABLET ORAL at 10:04

## 2019-12-09 RX ADMIN — RITUXIMAB 1000 MG: 10 INJECTION, SOLUTION INTRAVENOUS at 10:31

## 2019-12-09 RX ADMIN — METHYLPREDNISOLONE SODIUM SUCCINATE 100 MG: 125 INJECTION, POWDER, FOR SOLUTION INTRAMUSCULAR; INTRAVENOUS at 10:05

## 2019-12-09 RX ADMIN — ACETAMINOPHEN 650 MG: 325 TABLET ORAL at 10:04

## 2019-12-09 NOTE — PROGRESS NOTES
Chemotherapy Verification - PRIMARY RN      Height = 1.626 m  Weight = 76.7 kg  BSA = 1.86 m2      Medication: Rituximab  Dose: 1000 mg  Calculated Dose: 1000 mg (set dose)                            (In mg/m2, AUC, mg/kg)     I confirm this process was performed independently with the BSA and all final chemotherapy dosing calculations congruent.  Any discrepancies of 10% or greater have been addressed with the chemotherapy pharmacist. The resolution of the discrepancy has been documented in the EPIC progress notes.

## 2019-12-09 NOTE — PROGRESS NOTES
Chemotherapy Verification - SECONDARY RN       Height = 162.6 cm  Weight = 76.7 kg  BSA = 1.86 m^2       Medication: Rituxan  Dose: 1000 mg (set dose)  Calculated Dose: 1000 mg (set dose)                             (In mg/m2, AUC, mg/kg)     I confirm that this process was performed independently.

## 2019-12-09 NOTE — PROGRESS NOTES
Patient presents to clinic for first dose of Rituxan. Pt denies any recent infections, fevers, or bleeding. Plan of care reviewed with pt. Hepatitis B panel was negative on 12/3/19. PIV established, brisk blood return noted. Premeds administered as ordered. IVIG initiated at initial rate of 50 mg/hr and titrated every 30 minutes to a max rate of 400 mg/hr. Pt tolerated infusion well with no s/s of adverse reaction. PIV flushed, catheter removed intact. Pt discharged in stable, ambulatory condition.

## 2019-12-23 ENCOUNTER — OUTPATIENT INFUSION SERVICES (OUTPATIENT)
Dept: ONCOLOGY | Facility: MEDICAL CENTER | Age: 69
End: 2019-12-23
Attending: INTERNAL MEDICINE
Payer: MEDICARE

## 2019-12-23 VITALS
DIASTOLIC BLOOD PRESSURE: 51 MMHG | RESPIRATION RATE: 18 BRPM | WEIGHT: 163.36 LBS | OXYGEN SATURATION: 97 % | BODY MASS INDEX: 27.89 KG/M2 | TEMPERATURE: 98 F | HEART RATE: 72 BPM | SYSTOLIC BLOOD PRESSURE: 145 MMHG | HEIGHT: 64 IN

## 2019-12-23 DIAGNOSIS — M13.0 POLYARTHRITIS: ICD-10-CM

## 2019-12-23 PROCEDURE — 96415 CHEMO IV INFUSION ADDL HR: CPT

## 2019-12-23 PROCEDURE — 96375 TX/PRO/DX INJ NEW DRUG ADDON: CPT

## 2019-12-23 PROCEDURE — 700105 HCHG RX REV CODE 258: Performed by: INTERNAL MEDICINE

## 2019-12-23 PROCEDURE — 700102 HCHG RX REV CODE 250 W/ 637 OVERRIDE(OP): Performed by: INTERNAL MEDICINE

## 2019-12-23 PROCEDURE — A9270 NON-COVERED ITEM OR SERVICE: HCPCS | Performed by: INTERNAL MEDICINE

## 2019-12-23 PROCEDURE — 96413 CHEMO IV INFUSION 1 HR: CPT

## 2019-12-23 PROCEDURE — 700111 HCHG RX REV CODE 636 W/ 250 OVERRIDE (IP): Performed by: INTERNAL MEDICINE

## 2019-12-23 RX ORDER — ACETAMINOPHEN 325 MG/1
650 TABLET ORAL ONCE
Status: COMPLETED | OUTPATIENT
Start: 2019-12-23 | End: 2019-12-23

## 2019-12-23 RX ORDER — HYDROCHLOROTHIAZIDE 12.5 MG/1
12.5 TABLET ORAL
COMMUNITY
Start: 2019-12-07 | End: 2020-05-05

## 2019-12-23 RX ORDER — METHOTREXATE 2.5 MG/1
17.5 TABLET ORAL
COMMUNITY
Start: 2019-12-10

## 2019-12-23 RX ORDER — TRIAMCINOLONE ACETONIDE 1 MG/G
CREAM TOPICAL
COMMUNITY
Start: 2019-12-10 | End: 2020-05-19

## 2019-12-23 RX ORDER — HYDROXYZINE HYDROCHLORIDE 25 MG/1
TABLET, FILM COATED ORAL
COMMUNITY
Start: 2019-12-10 | End: 2019-12-23

## 2019-12-23 RX ORDER — FOLIC ACID 1 MG/1
TABLET ORAL
COMMUNITY
Start: 2019-12-10 | End: 2020-05-19

## 2019-12-23 RX ORDER — METHYLPREDNISOLONE SODIUM SUCCINATE 125 MG/2ML
100 INJECTION, POWDER, LYOPHILIZED, FOR SOLUTION INTRAMUSCULAR; INTRAVENOUS ONCE
Status: COMPLETED | OUTPATIENT
Start: 2019-12-23 | End: 2019-12-23

## 2019-12-23 RX ORDER — DIPHENHYDRAMINE HCL 25 MG
25 TABLET ORAL ONCE
Status: COMPLETED | OUTPATIENT
Start: 2019-12-23 | End: 2019-12-23

## 2019-12-23 RX ADMIN — RITUXIMAB 1000 MG: 10 INJECTION, SOLUTION INTRAVENOUS at 09:10

## 2019-12-23 RX ADMIN — ACETAMINOPHEN 650 MG: 325 TABLET ORAL at 08:39

## 2019-12-23 RX ADMIN — DIPHENHYDRAMINE HCL 25 MG: 25 TABLET ORAL at 08:39

## 2019-12-23 RX ADMIN — METHYLPREDNISOLONE SODIUM SUCCINATE 100 MG: 125 INJECTION, POWDER, FOR SOLUTION INTRAMUSCULAR; INTRAVENOUS at 08:39

## 2019-12-23 NOTE — PROGRESS NOTES
Chemotherapy Verification - PRIMARY RN    D15    Height = 161.5 cm  Weight = 74.1 kg  BSA = 1.82 m2       Medication: Rituximab (Rituxan)  Dose: fixed dose  Calculated Dose: 1000 mg fixed dose for RA                              I confirm this process was performed independently with the BSA and all final chemotherapy dosing calculations congruent.  Any discrepancies of 10% or greater have been addressed with the chemotherapy pharmacist. The resolution of the discrepancy has been documented in the EPIC progress notes.

## 2019-12-23 NOTE — PROGRESS NOTES
Chemotherapy Verification - SECONDARY RN       Height = 161.5 cm  Weight = 74.1 kg  BSA = 1.82 m2       Medication: Rituxan  Dose: set dose  Calculated Dose: 1000 mg                              (In mg/m2, AUC, mg/kg)     I confirm that this process was performed independently.

## 2019-12-23 NOTE — PROGRESS NOTES
Pt arrived to [facility] ambulatory, here for D15 Rituxan for RA. Pt denies any current infections at this time. No labs required. IV access established. Premeds given. Rituxan infused utilizing subsequent infusion ramping rate. Pt sushant well with no apparent issues. Line flushed clear. IV flushed and removed. Pressure dressing applied. Pt knows she will need to return in 6 months, deciding to not make next appt as she wants to discuss plan of care with MD. Pt discharged home under self care in no apparent distress.

## 2020-01-08 ENCOUNTER — HOSPITAL ENCOUNTER (OUTPATIENT)
Dept: LAB | Facility: MEDICAL CENTER | Age: 70
End: 2020-01-08
Attending: INTERNAL MEDICINE
Payer: MEDICARE

## 2020-01-08 LAB
ALBUMIN SERPL BCP-MCNC: 3.8 G/DL (ref 3.2–4.9)
ALBUMIN/GLOB SERPL: 1.3 G/DL
ALP SERPL-CCNC: 51 U/L (ref 30–99)
ALT SERPL-CCNC: 59 U/L (ref 2–50)
ANION GAP SERPL CALC-SCNC: 7 MMOL/L (ref 0–11.9)
AST SERPL-CCNC: 44 U/L (ref 12–45)
BASOPHILS # BLD AUTO: 0.9 % (ref 0–1.8)
BASOPHILS # BLD: 0.08 K/UL (ref 0–0.12)
BILIRUB SERPL-MCNC: 0.3 MG/DL (ref 0.1–1.5)
BUN SERPL-MCNC: 17 MG/DL (ref 8–22)
CALCIUM SERPL-MCNC: 9.1 MG/DL (ref 8.5–10.5)
CHLORIDE SERPL-SCNC: 100 MMOL/L (ref 96–112)
CK SERPL-CCNC: 1116 U/L (ref 0–154)
CO2 SERPL-SCNC: 29 MMOL/L (ref 20–33)
CREAT SERPL-MCNC: 0.83 MG/DL (ref 0.5–1.4)
CRP SERPL HS-MCNC: 0.38 MG/DL (ref 0–0.75)
EOSINOPHIL # BLD AUTO: 0.17 K/UL (ref 0–0.51)
EOSINOPHIL NFR BLD: 2 % (ref 0–6.9)
ERYTHROCYTE [DISTWIDTH] IN BLOOD BY AUTOMATED COUNT: 53.5 FL (ref 35.9–50)
ERYTHROCYTE [SEDIMENTATION RATE] IN BLOOD BY WESTERGREN METHOD: 6 MM/HOUR (ref 0–30)
GLOBULIN SER CALC-MCNC: 2.9 G/DL (ref 1.9–3.5)
GLUCOSE SERPL-MCNC: 78 MG/DL (ref 65–99)
HCT VFR BLD AUTO: 44.8 % (ref 37–47)
HGB BLD-MCNC: 14.3 G/DL (ref 12–16)
IMM GRANULOCYTES # BLD AUTO: 0.05 K/UL (ref 0–0.11)
IMM GRANULOCYTES NFR BLD AUTO: 0.6 % (ref 0–0.9)
LDH SERPL L TO P-CCNC: 298 U/L (ref 107–266)
LYMPHOCYTES # BLD AUTO: 2.29 K/UL (ref 1–4.8)
LYMPHOCYTES NFR BLD: 26.6 % (ref 22–41)
MCH RBC QN AUTO: 29.2 PG (ref 27–33)
MCHC RBC AUTO-ENTMCNC: 31.9 G/DL (ref 33.6–35)
MCV RBC AUTO: 91.6 FL (ref 81.4–97.8)
MONOCYTES # BLD AUTO: 0.95 K/UL (ref 0–0.85)
MONOCYTES NFR BLD AUTO: 11 % (ref 0–13.4)
NEUTROPHILS # BLD AUTO: 5.07 K/UL (ref 2–7.15)
NEUTROPHILS NFR BLD: 58.9 % (ref 44–72)
NRBC # BLD AUTO: 0 K/UL
NRBC BLD-RTO: 0 /100 WBC
PLATELET # BLD AUTO: 314 K/UL (ref 164–446)
PMV BLD AUTO: 9.5 FL (ref 9–12.9)
POTASSIUM SERPL-SCNC: 4 MMOL/L (ref 3.6–5.5)
PROT SERPL-MCNC: 6.7 G/DL (ref 6–8.2)
RBC # BLD AUTO: 4.89 M/UL (ref 4.2–5.4)
SODIUM SERPL-SCNC: 136 MMOL/L (ref 135–145)
WBC # BLD AUTO: 8.6 K/UL (ref 4.8–10.8)

## 2020-01-08 PROCEDURE — 85025 COMPLETE CBC W/AUTO DIFF WBC: CPT

## 2020-01-08 PROCEDURE — 85652 RBC SED RATE AUTOMATED: CPT

## 2020-01-08 PROCEDURE — 86140 C-REACTIVE PROTEIN: CPT

## 2020-01-08 PROCEDURE — 83615 LACTATE (LD) (LDH) ENZYME: CPT

## 2020-01-08 PROCEDURE — 80053 COMPREHEN METABOLIC PANEL: CPT

## 2020-01-08 PROCEDURE — 36415 COLL VENOUS BLD VENIPUNCTURE: CPT

## 2020-01-08 PROCEDURE — 82550 ASSAY OF CK (CPK): CPT

## 2020-01-26 DIAGNOSIS — J45.20 MILD INTERMITTENT ASTHMA WITHOUT COMPLICATION: ICD-10-CM

## 2020-01-27 RX ORDER — ALBUTEROL SULFATE 90 UG/1
AEROSOL, METERED RESPIRATORY (INHALATION)
Qty: 3 INHALER | Refills: 1 | Status: SHIPPED | OUTPATIENT
Start: 2020-01-27 | End: 2021-03-11 | Stop reason: SDUPTHER

## 2020-01-27 NOTE — TELEPHONE ENCOUNTER
Was the patient seen in the last year in this department? Yes    Does patient have an active prescription for medications requested? No     Received Request Via: Pharmacy      Pt met protocol?: Yes  LAST OV 11/26/2019

## 2020-02-12 ENCOUNTER — HOSPITAL ENCOUNTER (OUTPATIENT)
Dept: LAB | Facility: MEDICAL CENTER | Age: 70
End: 2020-02-12
Attending: INTERNAL MEDICINE
Payer: MEDICARE

## 2020-02-12 LAB
ALBUMIN SERPL BCP-MCNC: 3.9 G/DL (ref 3.2–4.9)
ALBUMIN/GLOB SERPL: 1.1 G/DL
ALP SERPL-CCNC: 71 U/L (ref 30–99)
ALT SERPL-CCNC: 41 U/L (ref 2–50)
ANION GAP SERPL CALC-SCNC: 8 MMOL/L (ref 0–11.9)
AST SERPL-CCNC: 37 U/L (ref 12–45)
BASOPHILS # BLD AUTO: 0.5 % (ref 0–1.8)
BASOPHILS # BLD: 0.04 K/UL (ref 0–0.12)
BILIRUB SERPL-MCNC: 0.4 MG/DL (ref 0.1–1.5)
BUN SERPL-MCNC: 12 MG/DL (ref 8–22)
CALCIUM SERPL-MCNC: 9 MG/DL (ref 8.5–10.5)
CHLORIDE SERPL-SCNC: 104 MMOL/L (ref 96–112)
CK SERPL-CCNC: 1212 U/L (ref 0–154)
CO2 SERPL-SCNC: 27 MMOL/L (ref 20–33)
CREAT SERPL-MCNC: 0.76 MG/DL (ref 0.5–1.4)
CRP SERPL HS-MCNC: 0.92 MG/DL (ref 0–0.75)
EOSINOPHIL # BLD AUTO: 0.21 K/UL (ref 0–0.51)
EOSINOPHIL NFR BLD: 2.9 % (ref 0–6.9)
ERYTHROCYTE [DISTWIDTH] IN BLOOD BY AUTOMATED COUNT: 48.4 FL (ref 35.9–50)
ERYTHROCYTE [SEDIMENTATION RATE] IN BLOOD BY WESTERGREN METHOD: 8 MM/HOUR (ref 0–30)
GLOBULIN SER CALC-MCNC: 3.5 G/DL (ref 1.9–3.5)
GLUCOSE SERPL-MCNC: 96 MG/DL (ref 65–99)
HCT VFR BLD AUTO: 41.8 % (ref 37–47)
HGB BLD-MCNC: 14 G/DL (ref 12–16)
IMM GRANULOCYTES # BLD AUTO: 0.04 K/UL (ref 0–0.11)
IMM GRANULOCYTES NFR BLD AUTO: 0.5 % (ref 0–0.9)
LDH SERPL L TO P-CCNC: 278 U/L (ref 107–266)
LYMPHOCYTES # BLD AUTO: 1.42 K/UL (ref 1–4.8)
LYMPHOCYTES NFR BLD: 19.5 % (ref 22–41)
MCH RBC QN AUTO: 30 PG (ref 27–33)
MCHC RBC AUTO-ENTMCNC: 33.5 G/DL (ref 33.6–35)
MCV RBC AUTO: 89.5 FL (ref 81.4–97.8)
MONOCYTES # BLD AUTO: 0.91 K/UL (ref 0–0.85)
MONOCYTES NFR BLD AUTO: 12.5 % (ref 0–13.4)
NEUTROPHILS # BLD AUTO: 4.68 K/UL (ref 2–7.15)
NEUTROPHILS NFR BLD: 64.1 % (ref 44–72)
NRBC # BLD AUTO: 0 K/UL
NRBC BLD-RTO: 0 /100 WBC
PLATELET # BLD AUTO: 290 K/UL (ref 164–446)
PMV BLD AUTO: 9.4 FL (ref 9–12.9)
POTASSIUM SERPL-SCNC: 4.3 MMOL/L (ref 3.6–5.5)
PROT SERPL-MCNC: 7.4 G/DL (ref 6–8.2)
RBC # BLD AUTO: 4.67 M/UL (ref 4.2–5.4)
SODIUM SERPL-SCNC: 139 MMOL/L (ref 135–145)
WBC # BLD AUTO: 7.3 K/UL (ref 4.8–10.8)

## 2020-02-12 PROCEDURE — 86140 C-REACTIVE PROTEIN: CPT

## 2020-02-12 PROCEDURE — 80053 COMPREHEN METABOLIC PANEL: CPT

## 2020-02-12 PROCEDURE — 85025 COMPLETE CBC W/AUTO DIFF WBC: CPT

## 2020-02-12 PROCEDURE — 82550 ASSAY OF CK (CPK): CPT

## 2020-02-12 PROCEDURE — 36415 COLL VENOUS BLD VENIPUNCTURE: CPT

## 2020-02-12 PROCEDURE — 85652 RBC SED RATE AUTOMATED: CPT

## 2020-02-12 PROCEDURE — 83615 LACTATE (LD) (LDH) ENZYME: CPT

## 2020-04-21 ENCOUNTER — OUTPATIENT INFUSION SERVICES (OUTPATIENT)
Dept: ONCOLOGY | Facility: MEDICAL CENTER | Age: 70
End: 2020-04-21
Attending: INTERNAL MEDICINE
Payer: MEDICARE

## 2020-04-21 VITALS
TEMPERATURE: 97.4 F | DIASTOLIC BLOOD PRESSURE: 68 MMHG | BODY MASS INDEX: 28.42 KG/M2 | WEIGHT: 166.45 LBS | RESPIRATION RATE: 18 BRPM | HEART RATE: 69 BPM | OXYGEN SATURATION: 100 % | SYSTOLIC BLOOD PRESSURE: 170 MMHG | HEIGHT: 64 IN

## 2020-04-21 PROCEDURE — 700111 HCHG RX REV CODE 636 W/ 250 OVERRIDE (IP): Mod: JG | Performed by: INTERNAL MEDICINE

## 2020-04-21 PROCEDURE — 96375 TX/PRO/DX INJ NEW DRUG ADDON: CPT

## 2020-04-21 PROCEDURE — 700105 HCHG RX REV CODE 258: Performed by: INTERNAL MEDICINE

## 2020-04-21 PROCEDURE — 96415 CHEMO IV INFUSION ADDL HR: CPT

## 2020-04-21 PROCEDURE — 700102 HCHG RX REV CODE 250 W/ 637 OVERRIDE(OP): Performed by: INTERNAL MEDICINE

## 2020-04-21 PROCEDURE — A9270 NON-COVERED ITEM OR SERVICE: HCPCS | Performed by: INTERNAL MEDICINE

## 2020-04-21 PROCEDURE — 96413 CHEMO IV INFUSION 1 HR: CPT

## 2020-04-21 RX ORDER — ACETAMINOPHEN 325 MG/1
650 TABLET ORAL ONCE
Status: COMPLETED | OUTPATIENT
Start: 2020-04-21 | End: 2020-04-21

## 2020-04-21 RX ORDER — DIPHENHYDRAMINE HCL 25 MG
25 TABLET ORAL ONCE
Status: COMPLETED | OUTPATIENT
Start: 2020-04-21 | End: 2020-04-21

## 2020-04-21 RX ORDER — METHYLPREDNISOLONE SODIUM SUCCINATE 125 MG/2ML
100 INJECTION, POWDER, LYOPHILIZED, FOR SOLUTION INTRAMUSCULAR; INTRAVENOUS ONCE
Status: COMPLETED | OUTPATIENT
Start: 2020-04-21 | End: 2020-04-21

## 2020-04-21 RX ADMIN — ACETAMINOPHEN 650 MG: 325 TABLET ORAL at 11:33

## 2020-04-21 RX ADMIN — RITUXIMAB 1000 MG: 10 INJECTION, SOLUTION INTRAVENOUS at 12:06

## 2020-04-21 RX ADMIN — METHYLPREDNISOLONE SODIUM SUCCINATE 100 MG: 125 INJECTION, POWDER, FOR SOLUTION INTRAMUSCULAR; INTRAVENOUS at 11:34

## 2020-04-21 RX ADMIN — DIPHENHYDRAMINE HCL 25 MG: 25 TABLET ORAL at 11:33

## 2020-04-21 ASSESSMENT — FIBROSIS 4 INDEX: FIB4 SCORE: 1.34

## 2020-04-21 NOTE — PROGRESS NOTES
Patient arrived to [facility] for Day Rituxan for RA.  Pt denies any s/s of infection or fevers.  PIV established with good blood return noted.  Pre medications given and Rituxan infused at subsequent rate (last dose Dec 2019).  Pt tolerated well.  PIv flushed, removed, and gauze/coban placed.  Confirmed next appointment and pt ambulated out of clinic in no apparent distress.

## 2020-04-21 NOTE — PROGRESS NOTES
Chemotherapy Verification - PRIMARY RN      Height = 162.6 cm  Weight = 75.5 kg  BSA = 1.85 m2       Medication: Rituxan  Dose: 1000 mg set dose  Calculated Dose: 1000 mg set dose                             (In mg/m2, AUC, mg/kg)           I confirm this process was performed independently with the BSA and all final chemotherapy dosing calculations congruent.  Any discrepancies of 10% or greater have been addressed with the chemotherapy pharmacist. The resolution of the discrepancy has been documented in the EPIC progress notes.

## 2020-05-05 ENCOUNTER — OUTPATIENT INFUSION SERVICES (OUTPATIENT)
Dept: ONCOLOGY | Facility: MEDICAL CENTER | Age: 70
End: 2020-05-05
Attending: INTERNAL MEDICINE
Payer: MEDICARE

## 2020-05-05 VITALS
TEMPERATURE: 97.6 F | BODY MASS INDEX: 28.04 KG/M2 | HEART RATE: 78 BPM | RESPIRATION RATE: 18 BRPM | DIASTOLIC BLOOD PRESSURE: 57 MMHG | WEIGHT: 164.24 LBS | HEIGHT: 64 IN | OXYGEN SATURATION: 98 % | SYSTOLIC BLOOD PRESSURE: 118 MMHG

## 2020-05-05 DIAGNOSIS — M06.09 RHEUMATOID ARTHRITIS OF MULTIPLE SITES WITHOUT RHEUMATOID FACTOR (HCC): ICD-10-CM

## 2020-05-05 LAB
ALBUMIN SERPL BCP-MCNC: 3.7 G/DL (ref 3.2–4.9)
ALBUMIN/GLOB SERPL: 1.2 G/DL
ALP SERPL-CCNC: 65 U/L (ref 30–99)
ALT SERPL-CCNC: 15 U/L (ref 2–50)
ANION GAP SERPL CALC-SCNC: 13 MMOL/L (ref 7–16)
AST SERPL-CCNC: 15 U/L (ref 12–45)
BASOPHILS # BLD AUTO: 0.4 % (ref 0–1.8)
BASOPHILS # BLD: 0.03 K/UL (ref 0–0.12)
BILIRUB SERPL-MCNC: 0.2 MG/DL (ref 0.1–1.5)
BUN SERPL-MCNC: 21 MG/DL (ref 8–22)
CALCIUM SERPL-MCNC: 8.6 MG/DL (ref 8.5–10.5)
CHLORIDE SERPL-SCNC: 99 MMOL/L (ref 96–112)
CK SERPL-CCNC: 169 U/L (ref 0–154)
CO2 SERPL-SCNC: 22 MMOL/L (ref 20–33)
CREAT SERPL-MCNC: 0.91 MG/DL (ref 0.5–1.4)
CRP SERPL HS-MCNC: 0.62 MG/DL (ref 0–0.75)
EOSINOPHIL # BLD AUTO: 0.29 K/UL (ref 0–0.51)
EOSINOPHIL NFR BLD: 3.9 % (ref 0–6.9)
ERYTHROCYTE [DISTWIDTH] IN BLOOD BY AUTOMATED COUNT: 45.4 FL (ref 35.9–50)
ERYTHROCYTE [SEDIMENTATION RATE] IN BLOOD BY WESTERGREN METHOD: 15 MM/HOUR (ref 0–30)
GLOBULIN SER CALC-MCNC: 3 G/DL (ref 1.9–3.5)
GLUCOSE SERPL-MCNC: 135 MG/DL (ref 65–99)
HCT VFR BLD AUTO: 40.5 % (ref 37–47)
HGB BLD-MCNC: 13.8 G/DL (ref 12–16)
IMM GRANULOCYTES # BLD AUTO: 0.04 K/UL (ref 0–0.11)
IMM GRANULOCYTES NFR BLD AUTO: 0.5 % (ref 0–0.9)
LDH SERPL L TO P-CCNC: 182 U/L (ref 107–266)
LYMPHOCYTES # BLD AUTO: 1.31 K/UL (ref 1–4.8)
LYMPHOCYTES NFR BLD: 17.7 % (ref 22–41)
MCH RBC QN AUTO: 30.5 PG (ref 27–33)
MCHC RBC AUTO-ENTMCNC: 34.1 G/DL (ref 33.6–35)
MCV RBC AUTO: 89.4 FL (ref 81.4–97.8)
MONOCYTES # BLD AUTO: 0.81 K/UL (ref 0–0.85)
MONOCYTES NFR BLD AUTO: 11 % (ref 0–13.4)
NEUTROPHILS # BLD AUTO: 4.91 K/UL (ref 2–7.15)
NEUTROPHILS NFR BLD: 66.5 % (ref 44–72)
NRBC # BLD AUTO: 0 K/UL
NRBC BLD-RTO: 0 /100 WBC
PLATELET # BLD AUTO: 240 K/UL (ref 164–446)
PMV BLD AUTO: 9.5 FL (ref 9–12.9)
POTASSIUM SERPL-SCNC: 3.7 MMOL/L (ref 3.6–5.5)
PROT SERPL-MCNC: 6.7 G/DL (ref 6–8.2)
RBC # BLD AUTO: 4.53 M/UL (ref 4.2–5.4)
SODIUM SERPL-SCNC: 134 MMOL/L (ref 135–145)
WBC # BLD AUTO: 7.4 K/UL (ref 4.8–10.8)

## 2020-05-05 PROCEDURE — 83615 LACTATE (LD) (LDH) ENZYME: CPT

## 2020-05-05 PROCEDURE — 700105 HCHG RX REV CODE 258: Performed by: INTERNAL MEDICINE

## 2020-05-05 PROCEDURE — 85652 RBC SED RATE AUTOMATED: CPT

## 2020-05-05 PROCEDURE — 96415 CHEMO IV INFUSION ADDL HR: CPT

## 2020-05-05 PROCEDURE — 700102 HCHG RX REV CODE 250 W/ 637 OVERRIDE(OP): Performed by: INTERNAL MEDICINE

## 2020-05-05 PROCEDURE — A9270 NON-COVERED ITEM OR SERVICE: HCPCS | Performed by: INTERNAL MEDICINE

## 2020-05-05 PROCEDURE — 96375 TX/PRO/DX INJ NEW DRUG ADDON: CPT

## 2020-05-05 PROCEDURE — 96413 CHEMO IV INFUSION 1 HR: CPT

## 2020-05-05 PROCEDURE — 700111 HCHG RX REV CODE 636 W/ 250 OVERRIDE (IP): Mod: JG | Performed by: INTERNAL MEDICINE

## 2020-05-05 PROCEDURE — 86140 C-REACTIVE PROTEIN: CPT

## 2020-05-05 PROCEDURE — 82550 ASSAY OF CK (CPK): CPT

## 2020-05-05 PROCEDURE — 85025 COMPLETE CBC W/AUTO DIFF WBC: CPT

## 2020-05-05 PROCEDURE — 80053 COMPREHEN METABOLIC PANEL: CPT

## 2020-05-05 RX ORDER — DIPHENHYDRAMINE HCL 25 MG
25 TABLET ORAL ONCE
Status: COMPLETED | OUTPATIENT
Start: 2020-05-05 | End: 2020-05-05

## 2020-05-05 RX ORDER — ACETAMINOPHEN 325 MG/1
650 TABLET ORAL ONCE
Status: COMPLETED | OUTPATIENT
Start: 2020-05-05 | End: 2020-05-05

## 2020-05-05 RX ORDER — METHYLPREDNISOLONE SODIUM SUCCINATE 125 MG/2ML
100 INJECTION, POWDER, LYOPHILIZED, FOR SOLUTION INTRAMUSCULAR; INTRAVENOUS ONCE
Status: COMPLETED | OUTPATIENT
Start: 2020-05-05 | End: 2020-05-05

## 2020-05-05 RX ADMIN — METHYLPREDNISOLONE SODIUM SUCCINATE 100 MG: 125 INJECTION, POWDER, FOR SOLUTION INTRAMUSCULAR; INTRAVENOUS at 11:36

## 2020-05-05 RX ADMIN — RITUXIMAB 1000 MG: 10 INJECTION, SOLUTION INTRAVENOUS at 12:05

## 2020-05-05 RX ADMIN — ACETAMINOPHEN 650 MG: 325 TABLET ORAL at 11:35

## 2020-05-05 RX ADMIN — DIPHENHYDRAMINE HCL 25 MG: 25 TABLET ORAL at 11:35

## 2020-05-05 ASSESSMENT — FIBROSIS 4 INDEX: FIB4 SCORE: 1.34

## 2020-05-05 NOTE — PROGRESS NOTES
Chemotherapy Verification - PRIMARY RN      Height = 162.5 CM  Weight = 74.5 KG  BSA = 1.83 M2       Medication: Rituxan  Dose: set dose 1,000 mg  Calculated Dose: (set dose, no calculation needed) 1,000 mg                             (In mg/m2, AUC, mg/kg)     I confirm this process was performed independently with the BSA and all final chemotherapy dosing calculations congruent.  Any discrepancies of 10% or greater have been addressed with the chemotherapy pharmacist. The resolution of the discrepancy has been documented in the EPIC progress notes.

## 2020-05-05 NOTE — PROGRESS NOTES
Pt arrives to [facility] for Rituxan for RA.  Pt denies s/sx of infection or other complaints today.  Pt has written lab orders from Dr. Mar Bond office.  22g PIV established by ALMA Caruso and labs drawn via PIV.  Pre-medications given.  Rituxan infused at subsequent rates.  Pt tolerated infusion without adverse reaction.  PIV flushed and site removed.  Pt reports she will follow up with her doctor for future plan of care.  Pt has scheduling dept number to contact for future appts if needed.

## 2020-05-05 NOTE — PROGRESS NOTES
Chemotherapy Verification - SECONDARY RN       Height = 162.5 cm  Weight = 74.5 kg  BSA = 1.83 m2       Medication: Rituxan  Dose: 1,000 mg set dose  Calculated Dose: 1,000 mg set dose                             (In mg/m2, AUC, mg/kg)         I confirm that this process was performed independently.

## 2020-05-19 RX ORDER — ASPIRIN 325 MG
325 TABLET ORAL EVERY EVENING
COMMUNITY

## 2020-05-26 ENCOUNTER — APPOINTMENT (OUTPATIENT)
Dept: ADMISSIONS | Facility: MEDICAL CENTER | Age: 70
DRG: 039 | End: 2020-05-26
Attending: SURGERY
Payer: MEDICARE

## 2020-05-26 ENCOUNTER — HOSPITAL ENCOUNTER (OUTPATIENT)
Dept: RADIOLOGY | Facility: MEDICAL CENTER | Age: 70
DRG: 039 | End: 2020-05-26
Attending: SURGERY | Admitting: SURGERY
Payer: MEDICARE

## 2020-05-26 DIAGNOSIS — Z01.811 PRE-OPERATIVE RESPIRATORY EXAMINATION: ICD-10-CM

## 2020-05-26 DIAGNOSIS — Z01.812 PRE-OPERATIVE LABORATORY EXAMINATION: ICD-10-CM

## 2020-05-26 DIAGNOSIS — Z01.810 PRE-OPERATIVE CARDIOVASCULAR EXAMINATION: ICD-10-CM

## 2020-05-26 LAB
ABO GROUP BLD: NORMAL
ANION GAP SERPL CALC-SCNC: 11 MMOL/L (ref 7–16)
BASOPHILS # BLD AUTO: 0.4 % (ref 0–1.8)
BASOPHILS # BLD: 0.05 K/UL (ref 0–0.12)
BLD GP AB SCN SERPL QL: NORMAL
BUN SERPL-MCNC: 19 MG/DL (ref 8–22)
CALCIUM SERPL-MCNC: 9.1 MG/DL (ref 8.5–10.5)
CHLORIDE SERPL-SCNC: 101 MMOL/L (ref 96–112)
CO2 SERPL-SCNC: 26 MMOL/L (ref 20–33)
COVID ORDER STATUS COVID19: NORMAL
CREAT SERPL-MCNC: 0.92 MG/DL (ref 0.5–1.4)
EKG IMPRESSION: NORMAL
EOSINOPHIL # BLD AUTO: 0.17 K/UL (ref 0–0.51)
EOSINOPHIL NFR BLD: 1.4 % (ref 0–6.9)
ERYTHROCYTE [DISTWIDTH] IN BLOOD BY AUTOMATED COUNT: 47.8 FL (ref 35.9–50)
GLUCOSE SERPL-MCNC: 88 MG/DL (ref 65–99)
HCT VFR BLD AUTO: 44.5 % (ref 37–47)
HGB BLD-MCNC: 14.5 G/DL (ref 12–16)
IMM GRANULOCYTES # BLD AUTO: 0.09 K/UL (ref 0–0.11)
IMM GRANULOCYTES NFR BLD AUTO: 0.7 % (ref 0–0.9)
LYMPHOCYTES # BLD AUTO: 0.98 K/UL (ref 1–4.8)
LYMPHOCYTES NFR BLD: 8.2 % (ref 22–41)
MCH RBC QN AUTO: 30.2 PG (ref 27–33)
MCHC RBC AUTO-ENTMCNC: 32.6 G/DL (ref 33.6–35)
MCV RBC AUTO: 92.7 FL (ref 81.4–97.8)
MONOCYTES # BLD AUTO: 1.07 K/UL (ref 0–0.85)
MONOCYTES NFR BLD AUTO: 8.9 % (ref 0–13.4)
NEUTROPHILS # BLD AUTO: 9.65 K/UL (ref 2–7.15)
NEUTROPHILS NFR BLD: 80.4 % (ref 44–72)
NRBC # BLD AUTO: 0 K/UL
NRBC BLD-RTO: 0 /100 WBC
PLATELET # BLD AUTO: 267 K/UL (ref 164–446)
PMV BLD AUTO: 9.5 FL (ref 9–12.9)
POTASSIUM SERPL-SCNC: 5.4 MMOL/L (ref 3.6–5.5)
RBC # BLD AUTO: 4.8 M/UL (ref 4.2–5.4)
RH BLD: NORMAL
SODIUM SERPL-SCNC: 138 MMOL/L (ref 135–145)
WBC # BLD AUTO: 12 K/UL (ref 4.8–10.8)

## 2020-05-26 PROCEDURE — 72040 X-RAY EXAM NECK SPINE 2-3 VW: CPT

## 2020-05-26 PROCEDURE — 71045 X-RAY EXAM CHEST 1 VIEW: CPT

## 2020-05-26 PROCEDURE — 86901 BLOOD TYPING SEROLOGIC RH(D): CPT

## 2020-05-26 PROCEDURE — 36415 COLL VENOUS BLD VENIPUNCTURE: CPT

## 2020-05-26 PROCEDURE — 86850 RBC ANTIBODY SCREEN: CPT

## 2020-05-26 PROCEDURE — 80048 BASIC METABOLIC PNL TOTAL CA: CPT

## 2020-05-26 PROCEDURE — 93010 ELECTROCARDIOGRAM REPORT: CPT | Performed by: INTERNAL MEDICINE

## 2020-05-26 PROCEDURE — 93005 ELECTROCARDIOGRAM TRACING: CPT

## 2020-05-26 PROCEDURE — C9803 HOPD COVID-19 SPEC COLLECT: HCPCS

## 2020-05-26 PROCEDURE — 86900 BLOOD TYPING SEROLOGIC ABO: CPT

## 2020-05-26 PROCEDURE — 85025 COMPLETE CBC W/AUTO DIFF WBC: CPT

## 2020-05-27 LAB
SARS-COV-2 RNA RESP QL NAA+PROBE: NOT DETECTED
SPECIMEN SOURCE: NORMAL

## 2020-05-29 ENCOUNTER — ANESTHESIA (OUTPATIENT)
Dept: SURGERY | Facility: MEDICAL CENTER | Age: 70
DRG: 039 | End: 2020-05-29
Payer: MEDICARE

## 2020-05-29 ENCOUNTER — ANESTHESIA EVENT (OUTPATIENT)
Dept: SURGERY | Facility: MEDICAL CENTER | Age: 70
DRG: 039 | End: 2020-05-29
Payer: MEDICARE

## 2020-05-29 ENCOUNTER — HOSPITAL ENCOUNTER (INPATIENT)
Facility: MEDICAL CENTER | Age: 70
LOS: 1 days | DRG: 039 | End: 2020-05-30
Attending: SURGERY | Admitting: SURGERY
Payer: MEDICARE

## 2020-05-29 LAB
ABO + RH BLD: NORMAL
ACT BLD: 313 SEC (ref 74–137)
PATHOLOGY CONSULT NOTE: NORMAL

## 2020-05-29 PROCEDURE — 160002 HCHG RECOVERY MINUTES (STAT): Performed by: SURGERY

## 2020-05-29 PROCEDURE — A6402 STERILE GAUZE <= 16 SQ IN: HCPCS | Performed by: SURGERY

## 2020-05-29 PROCEDURE — 03CL0ZZ EXTIRPATION OF MATTER FROM LEFT INTERNAL CAROTID ARTERY, OPEN APPROACH: ICD-10-PCS | Performed by: SURGERY

## 2020-05-29 PROCEDURE — 700101 HCHG RX REV CODE 250: Performed by: ANESTHESIOLOGY

## 2020-05-29 PROCEDURE — 500002 HCHG ADHESIVE, DERMABOND: Performed by: SURGERY

## 2020-05-29 PROCEDURE — 85347 COAGULATION TIME ACTIVATED: CPT

## 2020-05-29 PROCEDURE — 160009 HCHG ANES TIME/MIN: Performed by: SURGERY

## 2020-05-29 PROCEDURE — 501837 HCHG SUTURE CV: Performed by: SURGERY

## 2020-05-29 PROCEDURE — 88300 SURGICAL PATH GROSS: CPT

## 2020-05-29 PROCEDURE — 700111 HCHG RX REV CODE 636 W/ 250 OVERRIDE (IP): Performed by: SURGERY

## 2020-05-29 PROCEDURE — 700105 HCHG RX REV CODE 258: Performed by: ANESTHESIOLOGY

## 2020-05-29 PROCEDURE — 160035 HCHG PACU - 1ST 60 MINS PHASE I: Performed by: SURGERY

## 2020-05-29 PROCEDURE — 160041 HCHG SURGERY MINUTES - EA ADDL 1 MIN LEVEL 4: Performed by: SURGERY

## 2020-05-29 PROCEDURE — 03CN0ZZ EXTIRPATION OF MATTER FROM LEFT EXTERNAL CAROTID ARTERY, OPEN APPROACH: ICD-10-PCS | Performed by: SURGERY

## 2020-05-29 PROCEDURE — 03CJ0ZZ EXTIRPATION OF MATTER FROM LEFT COMMON CAROTID ARTERY, OPEN APPROACH: ICD-10-PCS | Performed by: SURGERY

## 2020-05-29 PROCEDURE — 160048 HCHG OR STATISTICAL LEVEL 1-5: Performed by: SURGERY

## 2020-05-29 PROCEDURE — 501838 HCHG SUTURE GENERAL: Performed by: SURGERY

## 2020-05-29 PROCEDURE — 700101 HCHG RX REV CODE 250: Performed by: SURGERY

## 2020-05-29 PROCEDURE — C1768 GRAFT, VASCULAR: HCPCS | Performed by: SURGERY

## 2020-05-29 PROCEDURE — 700102 HCHG RX REV CODE 250 W/ 637 OVERRIDE(OP): Performed by: SURGERY

## 2020-05-29 PROCEDURE — A9270 NON-COVERED ITEM OR SERVICE: HCPCS | Performed by: ANESTHESIOLOGY

## 2020-05-29 PROCEDURE — 160029 HCHG SURGERY MINUTES - 1ST 30 MINS LEVEL 4: Performed by: SURGERY

## 2020-05-29 PROCEDURE — 03UL0KZ SUPPLEMENT LEFT INTERNAL CAROTID ARTERY WITH NONAUTOLOGOUS TISSUE SUBSTITUTE, OPEN APPROACH: ICD-10-PCS | Performed by: SURGERY

## 2020-05-29 PROCEDURE — 110454 HCHG SHELL REV 250: Performed by: SURGERY

## 2020-05-29 PROCEDURE — 700105 HCHG RX REV CODE 258: Performed by: SURGERY

## 2020-05-29 PROCEDURE — C1788 PORT, INDWELLING, IMP: HCPCS | Performed by: SURGERY

## 2020-05-29 PROCEDURE — C1725 CATH, TRANSLUMIN NON-LASER: HCPCS | Performed by: SURGERY

## 2020-05-29 PROCEDURE — 160036 HCHG PACU - EA ADDL 30 MINS PHASE I: Performed by: SURGERY

## 2020-05-29 PROCEDURE — 770006 HCHG ROOM/CARE - MED/SURG/GYN SEMI*

## 2020-05-29 PROCEDURE — 700102 HCHG RX REV CODE 250 W/ 637 OVERRIDE(OP): Performed by: ANESTHESIOLOGY

## 2020-05-29 PROCEDURE — A9270 NON-COVERED ITEM OR SERVICE: HCPCS | Performed by: SURGERY

## 2020-05-29 PROCEDURE — 03UJ0KZ SUPPLEMENT LEFT COMMON CAROTID ARTERY WITH NONAUTOLOGOUS TISSUE SUBSTITUTE, OPEN APPROACH: ICD-10-PCS | Performed by: SURGERY

## 2020-05-29 PROCEDURE — 700111 HCHG RX REV CODE 636 W/ 250 OVERRIDE (IP): Performed by: ANESTHESIOLOGY

## 2020-05-29 DEVICE — PATCH .8X8CM XENOSURE BIOLOGIC VASCULAR---ORDER IN MULTIPLES OF 5---: Type: IMPLANTABLE DEVICE | Status: FUNCTIONAL

## 2020-05-29 RX ORDER — ACETAMINOPHEN 500 MG
1000 TABLET ORAL EVERY 6 HOURS
Status: DISCONTINUED | OUTPATIENT
Start: 2020-05-29 | End: 2020-05-30 | Stop reason: HOSPADM

## 2020-05-29 RX ORDER — OXYCODONE HCL 5 MG/5 ML
5 SOLUTION, ORAL ORAL
Status: DISCONTINUED | OUTPATIENT
Start: 2020-05-29 | End: 2020-05-29 | Stop reason: HOSPADM

## 2020-05-29 RX ORDER — HEPARIN SODIUM 5000 [USP'U]/ML
5000 INJECTION, SOLUTION INTRAVENOUS; SUBCUTANEOUS EVERY 8 HOURS
Status: DISCONTINUED | OUTPATIENT
Start: 2020-05-30 | End: 2020-05-30 | Stop reason: HOSPADM

## 2020-05-29 RX ORDER — CEFAZOLIN SODIUM 1 G/3ML
INJECTION, POWDER, FOR SOLUTION INTRAMUSCULAR; INTRAVENOUS PRN
Status: DISCONTINUED | OUTPATIENT
Start: 2020-05-29 | End: 2020-05-29 | Stop reason: SURG

## 2020-05-29 RX ORDER — BISOPROLOL FUMARATE 5 MG/1
10 TABLET, FILM COATED ORAL
Status: DISCONTINUED | OUTPATIENT
Start: 2020-05-30 | End: 2020-05-30 | Stop reason: HOSPADM

## 2020-05-29 RX ORDER — HYDROMORPHONE HYDROCHLORIDE 1 MG/ML
0.1 INJECTION, SOLUTION INTRAMUSCULAR; INTRAVENOUS; SUBCUTANEOUS
Status: DISCONTINUED | OUTPATIENT
Start: 2020-05-29 | End: 2020-05-29 | Stop reason: HOSPADM

## 2020-05-29 RX ORDER — BUPIVACAINE HYDROCHLORIDE AND EPINEPHRINE 5; 5 MG/ML; UG/ML
INJECTION, SOLUTION EPIDURAL; INTRACAUDAL; PERINEURAL
Status: DISCONTINUED | OUTPATIENT
Start: 2020-05-29 | End: 2020-05-29 | Stop reason: HOSPADM

## 2020-05-29 RX ORDER — HEPARIN SODIUM,PORCINE 1000/ML
VIAL (ML) INJECTION
Status: DISCONTINUED | OUTPATIENT
Start: 2020-05-29 | End: 2020-05-29 | Stop reason: HOSPADM

## 2020-05-29 RX ORDER — HYDROXYCHLOROQUINE SULFATE 200 MG/1
200 TABLET, FILM COATED ORAL 2 TIMES DAILY
Status: DISCONTINUED | OUTPATIENT
Start: 2020-05-29 | End: 2020-05-30 | Stop reason: HOSPADM

## 2020-05-29 RX ORDER — CLONIDINE HYDROCHLORIDE 0.1 MG/1
0.2 TABLET ORAL
Status: DISCONTINUED | OUTPATIENT
Start: 2020-05-29 | End: 2020-05-30 | Stop reason: HOSPADM

## 2020-05-29 RX ORDER — ASPIRIN 325 MG
325 TABLET ORAL EVERY EVENING
Status: DISCONTINUED | OUTPATIENT
Start: 2020-05-29 | End: 2020-05-30 | Stop reason: HOSPADM

## 2020-05-29 RX ORDER — HEPARIN SODIUM 1000 [USP'U]/ML
INJECTION, SOLUTION INTRAVENOUS; SUBCUTANEOUS PRN
Status: DISCONTINUED | OUTPATIENT
Start: 2020-05-29 | End: 2020-05-29 | Stop reason: SURG

## 2020-05-29 RX ORDER — HALOPERIDOL 5 MG/ML
1 INJECTION INTRAMUSCULAR
Status: DISCONTINUED | OUTPATIENT
Start: 2020-05-29 | End: 2020-05-29 | Stop reason: HOSPADM

## 2020-05-29 RX ORDER — ACETAMINOPHEN 500 MG
TABLET ORAL
Status: COMPLETED
Start: 2020-05-29 | End: 2020-05-29

## 2020-05-29 RX ORDER — HYDROXYCHLOROQUINE SULFATE 200 MG/1
200 TABLET, FILM COATED ORAL 2 TIMES DAILY
COMMUNITY

## 2020-05-29 RX ORDER — SUCCINYLCHOLINE/SOD CL,ISO/PF 200MG/10ML
SYRINGE (ML) INTRAVENOUS PRN
Status: DISCONTINUED | OUTPATIENT
Start: 2020-05-29 | End: 2020-05-29 | Stop reason: SURG

## 2020-05-29 RX ORDER — ONDANSETRON 2 MG/ML
8 INJECTION INTRAMUSCULAR; INTRAVENOUS EVERY 4 HOURS PRN
Status: DISCONTINUED | OUTPATIENT
Start: 2020-05-29 | End: 2020-05-30 | Stop reason: HOSPADM

## 2020-05-29 RX ORDER — LIDOCAINE HYDROCHLORIDE 20 MG/ML
INJECTION, SOLUTION EPIDURAL; INFILTRATION; INTRACAUDAL; PERINEURAL PRN
Status: DISCONTINUED | OUTPATIENT
Start: 2020-05-29 | End: 2020-05-29 | Stop reason: SURG

## 2020-05-29 RX ORDER — DEXAMETHASONE SODIUM PHOSPHATE 4 MG/ML
INJECTION, SOLUTION INTRA-ARTICULAR; INTRALESIONAL; INTRAMUSCULAR; INTRAVENOUS; SOFT TISSUE PRN
Status: DISCONTINUED | OUTPATIENT
Start: 2020-05-29 | End: 2020-05-29 | Stop reason: SURG

## 2020-05-29 RX ORDER — GABAPENTIN 300 MG/1
CAPSULE ORAL
Status: COMPLETED
Start: 2020-05-29 | End: 2020-05-29

## 2020-05-29 RX ORDER — OXYCODONE HCL 5 MG/5 ML
10 SOLUTION, ORAL ORAL
Status: DISCONTINUED | OUTPATIENT
Start: 2020-05-29 | End: 2020-05-29 | Stop reason: HOSPADM

## 2020-05-29 RX ORDER — SODIUM CHLORIDE, SODIUM LACTATE, POTASSIUM CHLORIDE, CALCIUM CHLORIDE 600; 310; 30; 20 MG/100ML; MG/100ML; MG/100ML; MG/100ML
INJECTION, SOLUTION INTRAVENOUS CONTINUOUS
Status: ACTIVE | OUTPATIENT
Start: 2020-05-29 | End: 2020-05-29

## 2020-05-29 RX ORDER — HYDROMORPHONE HYDROCHLORIDE 1 MG/ML
0.4 INJECTION, SOLUTION INTRAMUSCULAR; INTRAVENOUS; SUBCUTANEOUS
Status: DISCONTINUED | OUTPATIENT
Start: 2020-05-29 | End: 2020-05-29 | Stop reason: HOSPADM

## 2020-05-29 RX ORDER — LABETALOL HYDROCHLORIDE 5 MG/ML
5 INJECTION, SOLUTION INTRAVENOUS
Status: DISCONTINUED | OUTPATIENT
Start: 2020-05-29 | End: 2020-05-29 | Stop reason: HOSPADM

## 2020-05-29 RX ORDER — HYDRALAZINE HYDROCHLORIDE 20 MG/ML
5 INJECTION INTRAMUSCULAR; INTRAVENOUS
Status: DISCONTINUED | OUTPATIENT
Start: 2020-05-29 | End: 2020-05-29 | Stop reason: HOSPADM

## 2020-05-29 RX ORDER — HYDROMORPHONE HYDROCHLORIDE 2 MG/ML
INJECTION, SOLUTION INTRAMUSCULAR; INTRAVENOUS; SUBCUTANEOUS PRN
Status: DISCONTINUED | OUTPATIENT
Start: 2020-05-29 | End: 2020-05-29 | Stop reason: SURG

## 2020-05-29 RX ORDER — ONDANSETRON 2 MG/ML
4 INJECTION INTRAMUSCULAR; INTRAVENOUS
Status: DISCONTINUED | OUTPATIENT
Start: 2020-05-29 | End: 2020-05-29 | Stop reason: HOSPADM

## 2020-05-29 RX ORDER — CLONIDINE HYDROCHLORIDE 0.1 MG/1
0.1 TABLET ORAL
Status: DISCONTINUED | OUTPATIENT
Start: 2020-05-29 | End: 2020-05-30 | Stop reason: HOSPADM

## 2020-05-29 RX ORDER — CELECOXIB 100 MG/1
CAPSULE ORAL PRN
Status: DISCONTINUED | OUTPATIENT
Start: 2020-05-29 | End: 2020-05-29 | Stop reason: SURG

## 2020-05-29 RX ORDER — ROCURONIUM BROMIDE 10 MG/ML
INJECTION, SOLUTION INTRAVENOUS PRN
Status: DISCONTINUED | OUTPATIENT
Start: 2020-05-29 | End: 2020-05-29 | Stop reason: SURG

## 2020-05-29 RX ORDER — HYDROMORPHONE HYDROCHLORIDE 1 MG/ML
0.2 INJECTION, SOLUTION INTRAMUSCULAR; INTRAVENOUS; SUBCUTANEOUS
Status: DISCONTINUED | OUTPATIENT
Start: 2020-05-29 | End: 2020-05-29 | Stop reason: HOSPADM

## 2020-05-29 RX ORDER — PREDNISONE 10 MG/1
10 TABLET ORAL DAILY
Status: DISCONTINUED | OUTPATIENT
Start: 2020-05-30 | End: 2020-05-30 | Stop reason: HOSPADM

## 2020-05-29 RX ORDER — ONDANSETRON 2 MG/ML
INJECTION INTRAMUSCULAR; INTRAVENOUS PRN
Status: DISCONTINUED | OUTPATIENT
Start: 2020-05-29 | End: 2020-05-29 | Stop reason: SURG

## 2020-05-29 RX ORDER — GABAPENTIN 300 MG/1
CAPSULE ORAL PRN
Status: DISCONTINUED | OUTPATIENT
Start: 2020-05-29 | End: 2020-05-29 | Stop reason: SURG

## 2020-05-29 RX ORDER — CELECOXIB 200 MG/1
CAPSULE ORAL
Status: COMPLETED
Start: 2020-05-29 | End: 2020-05-29

## 2020-05-29 RX ORDER — PROTAMINE SULFATE 10 MG/ML
INJECTION, SOLUTION INTRAVENOUS PRN
Status: DISCONTINUED | OUTPATIENT
Start: 2020-05-29 | End: 2020-05-29 | Stop reason: SURG

## 2020-05-29 RX ORDER — ALBUTEROL SULFATE 90 UG/1
2 AEROSOL, METERED RESPIRATORY (INHALATION) EVERY 4 HOURS PRN
Status: DISCONTINUED | OUTPATIENT
Start: 2020-05-29 | End: 2020-05-30 | Stop reason: HOSPADM

## 2020-05-29 RX ORDER — SODIUM CHLORIDE, SODIUM LACTATE, POTASSIUM CHLORIDE, CALCIUM CHLORIDE 600; 310; 30; 20 MG/100ML; MG/100ML; MG/100ML; MG/100ML
INJECTION, SOLUTION INTRAVENOUS CONTINUOUS
Status: DISCONTINUED | OUTPATIENT
Start: 2020-05-29 | End: 2020-05-29 | Stop reason: HOSPADM

## 2020-05-29 RX ORDER — LABETALOL HYDROCHLORIDE 5 MG/ML
INJECTION, SOLUTION INTRAVENOUS PRN
Status: DISCONTINUED | OUTPATIENT
Start: 2020-05-29 | End: 2020-05-29 | Stop reason: SURG

## 2020-05-29 RX ORDER — DOCUSATE SODIUM 100 MG/1
100 CAPSULE, LIQUID FILLED ORAL 2 TIMES DAILY
Status: DISCONTINUED | OUTPATIENT
Start: 2020-05-29 | End: 2020-05-30 | Stop reason: HOSPADM

## 2020-05-29 RX ORDER — ACETAMINOPHEN 325 MG/1
TABLET ORAL PRN
Status: DISCONTINUED | OUTPATIENT
Start: 2020-05-29 | End: 2020-05-29 | Stop reason: SURG

## 2020-05-29 RX ORDER — OXYCODONE HYDROCHLORIDE 5 MG/1
5 TABLET ORAL
Status: DISCONTINUED | OUTPATIENT
Start: 2020-05-29 | End: 2020-05-30 | Stop reason: HOSPADM

## 2020-05-29 RX ORDER — SODIUM CHLORIDE, SODIUM LACTATE, POTASSIUM CHLORIDE, CALCIUM CHLORIDE 600; 310; 30; 20 MG/100ML; MG/100ML; MG/100ML; MG/100ML
INJECTION, SOLUTION INTRAVENOUS
Status: DISCONTINUED | OUTPATIENT
Start: 2020-05-29 | End: 2020-05-29 | Stop reason: SURG

## 2020-05-29 RX ADMIN — ROCURONIUM BROMIDE 10 MG: 10 INJECTION, SOLUTION INTRAVENOUS at 09:30

## 2020-05-29 RX ADMIN — ROCURONIUM BROMIDE 10 MG: 10 INJECTION, SOLUTION INTRAVENOUS at 08:04

## 2020-05-29 RX ADMIN — PROTAMINE SULFATE 10 MG: 10 INJECTION, SOLUTION INTRAVENOUS at 09:50

## 2020-05-29 RX ADMIN — EPHEDRINE SULFATE 5 MG: 50 INJECTION, SOLUTION INTRAVENOUS at 08:32

## 2020-05-29 RX ADMIN — ROCURONIUM BROMIDE 10 MG: 10 INJECTION, SOLUTION INTRAVENOUS at 08:47

## 2020-05-29 RX ADMIN — ROCURONIUM BROMIDE 10 MG: 10 INJECTION, SOLUTION INTRAVENOUS at 09:01

## 2020-05-29 RX ADMIN — SODIUM CHLORIDE, POTASSIUM CHLORIDE, SODIUM LACTATE AND CALCIUM CHLORIDE: 600; 310; 30; 20 INJECTION, SOLUTION INTRAVENOUS at 06:50

## 2020-05-29 RX ADMIN — EPHEDRINE SULFATE 5 MG: 50 INJECTION, SOLUTION INTRAVENOUS at 10:14

## 2020-05-29 RX ADMIN — PROTAMINE SULFATE 30 MG: 10 INJECTION, SOLUTION INTRAVENOUS at 09:51

## 2020-05-29 RX ADMIN — EPHEDRINE SULFATE 10 MG: 50 INJECTION, SOLUTION INTRAVENOUS at 08:03

## 2020-05-29 RX ADMIN — EPHEDRINE SULFATE 5 MG: 50 INJECTION, SOLUTION INTRAVENOUS at 10:16

## 2020-05-29 RX ADMIN — SUGAMMADEX 200 MG: 100 INJECTION, SOLUTION INTRAVENOUS at 10:02

## 2020-05-29 RX ADMIN — PROPOFOL 50 MG: 10 INJECTION, EMULSION INTRAVENOUS at 10:07

## 2020-05-29 RX ADMIN — SODIUM CHLORIDE, POTASSIUM CHLORIDE, SODIUM LACTATE AND CALCIUM CHLORIDE: 600; 310; 30; 20 INJECTION, SOLUTION INTRAVENOUS at 07:42

## 2020-05-29 RX ADMIN — FENTANYL CITRATE 50 MCG: 50 INJECTION INTRAMUSCULAR; INTRAVENOUS at 07:48

## 2020-05-29 RX ADMIN — ACETAMINOPHEN 1000 MG: 325 TABLET, FILM COATED ORAL at 07:09

## 2020-05-29 RX ADMIN — Medication 100 MG: at 07:48

## 2020-05-29 RX ADMIN — CEFAZOLIN 2 G: 330 INJECTION, POWDER, FOR SOLUTION INTRAMUSCULAR; INTRAVENOUS at 07:54

## 2020-05-29 RX ADMIN — POVIDONE-IODINE 15 ML: 10 SOLUTION TOPICAL at 06:50

## 2020-05-29 RX ADMIN — CELECOXIB 200 MG: 100 CAPSULE ORAL at 07:09

## 2020-05-29 RX ADMIN — ACETAMINOPHEN 1000 MG: 500 TABLET ORAL at 17:07

## 2020-05-29 RX ADMIN — ROCURONIUM BROMIDE 10 MG: 10 INJECTION, SOLUTION INTRAVENOUS at 08:23

## 2020-05-29 RX ADMIN — SODIUM CHLORIDE, POTASSIUM CHLORIDE, SODIUM LACTATE AND CALCIUM CHLORIDE: 600; 310; 30; 20 INJECTION, SOLUTION INTRAVENOUS at 11:01

## 2020-05-29 RX ADMIN — ONDANSETRON 4 MG: 2 INJECTION INTRAMUSCULAR; INTRAVENOUS at 09:48

## 2020-05-29 RX ADMIN — LABETALOL HYDROCHLORIDE 10 MG: 5 INJECTION, SOLUTION INTRAVENOUS at 10:07

## 2020-05-29 RX ADMIN — ROCURONIUM BROMIDE 30 MG: 10 INJECTION, SOLUTION INTRAVENOUS at 07:48

## 2020-05-29 RX ADMIN — ROCURONIUM BROMIDE 10 MG: 10 INJECTION, SOLUTION INTRAVENOUS at 09:50

## 2020-05-29 RX ADMIN — FENTANYL CITRATE 50 MCG: 50 INJECTION INTRAMUSCULAR; INTRAVENOUS at 09:05

## 2020-05-29 RX ADMIN — FENTANYL CITRATE 50 MCG: 50 INJECTION INTRAMUSCULAR; INTRAVENOUS at 08:07

## 2020-05-29 RX ADMIN — EPHEDRINE SULFATE 5 MG: 50 INJECTION, SOLUTION INTRAVENOUS at 08:15

## 2020-05-29 RX ADMIN — LIDOCAINE HYDROCHLORIDE 100 MG: 20 INJECTION, SOLUTION EPIDURAL; INFILTRATION; INTRACAUDAL at 07:48

## 2020-05-29 RX ADMIN — DEXAMETHASONE SODIUM PHOSPHATE 8 MG: 4 INJECTION, SOLUTION INTRA-ARTICULAR; INTRALESIONAL; INTRAMUSCULAR; INTRAVENOUS; SOFT TISSUE at 07:59

## 2020-05-29 RX ADMIN — GABAPENTIN 300 MG: 300 CAPSULE ORAL at 07:09

## 2020-05-29 RX ADMIN — ASPIRIN 325 MG: 325 TABLET, FILM COATED ORAL at 17:07

## 2020-05-29 RX ADMIN — HEPARIN SODIUM 7000 UNITS: 1000 INJECTION, SOLUTION INTRAVENOUS; SUBCUTANEOUS at 08:47

## 2020-05-29 RX ADMIN — ROCURONIUM BROMIDE 10 MG: 10 INJECTION, SOLUTION INTRAVENOUS at 09:15

## 2020-05-29 RX ADMIN — HYDROXYCHLOROQUINE SULFATE 200 MG: 200 TABLET, FILM COATED ORAL at 17:07

## 2020-05-29 RX ADMIN — HYDROMORPHONE HYDROCHLORIDE 0.6 MG: 2 INJECTION, SOLUTION INTRAMUSCULAR; INTRAVENOUS; SUBCUTANEOUS at 09:54

## 2020-05-29 RX ADMIN — FENTANYL CITRATE 50 MCG: 50 INJECTION INTRAMUSCULAR; INTRAVENOUS at 07:58

## 2020-05-29 RX ADMIN — PROPOFOL 120 MG: 10 INJECTION, EMULSION INTRAVENOUS at 07:48

## 2020-05-29 RX ADMIN — SODIUM CHLORIDE, POTASSIUM CHLORIDE, SODIUM LACTATE AND CALCIUM CHLORIDE: 600; 310; 30; 20 INJECTION, SOLUTION INTRAVENOUS at 09:16

## 2020-05-29 RX ADMIN — EPHEDRINE SULFATE 10 MG: 50 INJECTION, SOLUTION INTRAVENOUS at 08:49

## 2020-05-29 ASSESSMENT — COGNITIVE AND FUNCTIONAL STATUS - GENERAL
DRESSING REGULAR LOWER BODY CLOTHING: A LITTLE
SUGGESTED CMS G CODE MODIFIER DAILY ACTIVITY: CI
MOBILITY SCORE: 21
WALKING IN HOSPITAL ROOM: A LITTLE
DAILY ACTIVITIY SCORE: 23
SUGGESTED CMS G CODE MODIFIER MOBILITY: CJ
CLIMB 3 TO 5 STEPS WITH RAILING: A LITTLE
STANDING UP FROM CHAIR USING ARMS: A LITTLE

## 2020-05-29 ASSESSMENT — LIFESTYLE VARIABLES
TOTAL SCORE: 0
EVER_SMOKED: YES
CONSUMPTION TOTAL: NEGATIVE
DOES PATIENT WANT TO STOP DRINKING: NO
ALCOHOL_USE: YES
EVER FELT BAD OR GUILTY ABOUT YOUR DRINKING: NO
EVER HAD A DRINK FIRST THING IN THE MORNING TO STEADY YOUR NERVES TO GET RID OF A HANGOVER: NO
TOTAL SCORE: 0
HOW MANY TIMES IN THE PAST YEAR HAVE YOU HAD 5 OR MORE DRINKS IN A DAY: 0
ON A TYPICAL DAY WHEN YOU DRINK ALCOHOL HOW MANY DRINKS DO YOU HAVE: 1
AVERAGE NUMBER OF DAYS PER WEEK YOU HAVE A DRINK CONTAINING ALCOHOL: 0
TOTAL SCORE: 0
HAVE YOU EVER FELT YOU SHOULD CUT DOWN ON YOUR DRINKING: NO
HAVE PEOPLE ANNOYED YOU BY CRITICIZING YOUR DRINKING: NO

## 2020-05-29 ASSESSMENT — FIBROSIS 4 INDEX
FIB4 SCORE: 0.97

## 2020-05-29 ASSESSMENT — PAIN SCALES - GENERAL: PAIN_LEVEL: 0

## 2020-05-29 NOTE — OP REPORT
OPERATIVE REPORT      Patient:  Vane Ritter     Procedure Date:  05/29/20    Indication:  The patient presents for scheduled CEA for high-grade asymptomatic left carotid disease    Pre-Operative Diagnosis:  High-grade asymptomatic left carotid stenosis    Post-Operative Diagnosis:  Same    Procedure:  Left Carotid Endarterectomy    Surgeon:  Mukund Adorno M.D.    Assistant:  Howard Villa MD    Anesthesia:  General endotracheal    EBL:  50cc    Specimen:  Left carotid plaque    Complications:  None    Findings:  CEA was performed under continuous cerebral oximetry monitoring and there was no change on vessel clamping or throughout the case.. There was extensive calcific disease extending from the distal common carotid artery, through the bulb, and into the proximal ICA.. A standard endarterectomy and bovine pericardial patch angioplasty was performed. At the conclusion of the case, the patient was moving all extremities purposefully and the tongue was midline.    Procedure:  Informed consent was obtained from the patient in the pre-operative holding area. All risks, benefits, and alternatives were explained and She elected to proceed. The patient was brought to the operating room and placed on the table in a supine position. General anesthesia was induced, cuco-operative antibiotics were given, and a time-out was performed verifying the correct site of surgery. The patient was prepped and draped in the usual sterile fashion.    An incision was made in the skin overlying the Left sternocleidomastoid muscle. Bovie was used to deepen the incision and the platysma was divided. The anterior border of the SCM was mobilized and retracted laterally. The internal jugular vein was identified and dissected along its anterior border. Several small venous branches were ligated and divided. The facial vein was identified, ligated, and divided and jugular was retracted laterally with  self-retaining retractors.    The common carotid artery was identified, dissected, and encircled with a vessel loop taking care to identify and protect the vagus nerve. Next, we dissected out the external carotid and superior thyroid arteries which were isolated with vessel loops. The internal carotid artery was then dissected and isolated with a loop. The hypoglossal nerve was identified and gently retracted away from the field.    The patient was given 100U/kg heparin which was allowed to circulate for three minutes prior to checking an ACT. Once the ACT was over 250, the vessels were sequentially clamped. ACTs were checked every 30 minutes and maintained between 250-300 for the duration of the case.    Once vascular control was obtained, a longitudinal arteriotomy was made in the common carotid artery and extended onto the internal carotid artery using Macdonald scissors. An endarterectomy was performed in the standard fashion of the carotid bulb, distal common carotid artery, and internal carotid artery. An eversion technique was used to endarterectomize the external carotid artery. The field was copiously irrigated and the vessels were free of debris. 7-0 prolene interrupted suture was used to tack the distal endpoint.    Patch angioplasty was performed using a bovine pericardial patch which was sutured in place using running 6-0 prolene suture.. Prior to completing the suture line, the ICA and ECA were backflushed and the CCA was flushed antegrade. The patch was irrigated and the suture line was secured. The vessels were unclamped sequentially, flushing the common carotid through the external for several heartbeats. The internal carotid was then unclamped.     Protamine was given and hemostasis was obtained. The anterior cervical fascia was reapproximated using 2-0 vicryl suture. The platysma was reapproximated using running 3-0 vicryl suture and skin was closed with 4-0 monocryl. An occlusive dressing was applied  and the patient was recovered from anesthesia and taken to PACU in stable condition. Prior to leaving the operating room She was moving all extremities purposefully and the tongue was midline.    Mukund Adorno M.D.  Vascular Surgeon  [facility]

## 2020-05-29 NOTE — ANESTHESIA PREPROCEDURE EVALUATION
Relevant Problems   ANESTHESIA   (+) AAZM (obstructive sleep apnea)      PULMONARY   (+) Mild intermittent asthma without complication      CARDIAC   (+) Coronary artery disease involving native coronary artery of native heart without angina pectoris   (+) Essential hypertension     Smoker      Physical Exam    Airway   Mallampati: II  TM distance: >3 FB  Neck ROM: full       Cardiovascular - normal exam  Rhythm: regular  Rate: normal  (-) murmur     Dental - normal exam           Pulmonary - normal exam  Breath sounds clear to auscultation     Abdominal    Neurological - normal exam                 Anesthesia Plan    ASA 2       Plan - general       Airway plan will be ETT        Induction: intravenous    Postoperative Plan: Postoperative administration of opioids is intended.    Pertinent diagnostic labs and testing reviewed    Informed Consent:    Anesthetic plan and risks discussed with patient.    Use of blood products discussed with: patient whom consented to blood products.

## 2020-05-29 NOTE — OR NURSING
Report called to Kelly MARQUEZ. All pertinent events, medical information and care plan details reported to receiving RN. Reviewed lines and drains including PIV. Reviewed vital signs, allergies, code status, and pertinent assessment findings including focused neurological and surgical site assessment. Surgical site CDI. All questions and concerns addressed. Patient remains HDS on 3LNC, AOx4 at this time. Pt tolerating PO liquids. Patient educated on next phase of care.

## 2020-05-29 NOTE — PROGRESS NOTES
Report received from PACU.  Assessment complete.  A&O x 4. Patient calls appropriately.  Patient ambulates with stand by.   Pain managed with prescribed medications.  Denies N&V. Tolerating diet.  Surgical incision to L neck, intact with dermabond.  + void  Patient denies SOB.  SCD's in place.  Review plan with of care with patient. Call light and personal belongings with in reach. Hourly rounding in place. All needs met at this time.A

## 2020-05-29 NOTE — ANESTHESIA PROCEDURE NOTES
Airway    Date/Time: 5/29/2020 7:48 AM  Performed by: Norm Arauz M.D.  Authorized by: Norm Arauz M.D.     Location:  OR  Urgency:  Elective  Indications for Airway Management:  Anesthesia      Spontaneous Ventilation: absent    Sedation Level:  Deep  Preoxygenated: Yes    Patient Position:  Sniffing  Mask Difficulty Assessment:  0 - not attempted  Final Airway Type:  Endotracheal airway  Final Endotracheal Airway:  ETT  Cuffed: Yes    Technique Used for Successful ETT Placement:  Direct laryngoscopy    Insertion Site:  Oral  Blade Type:  Mora  Laryngoscope Blade/Videolaryngoscope Blade Size:  3  ETT Size (mm):  7.0  Measured from:  Teeth  ETT to Teeth (cm):  20  Placement Verified by: auscultation and capnometry    Cormack-Lehane Classification:  Grade IIa - partial view of glottis  Number of Attempts at Approach:  1

## 2020-05-29 NOTE — OR NURSING
This RN asked patient if she would like  called for update and room number. Patient states she will text him herself.

## 2020-05-29 NOTE — ANESTHESIA POSTPROCEDURE EVALUATION
Patient: Vane Ritter    Procedure Summary     Date:  05/29/20 Room / Location:  [facility] OR 08 / SURGERY [facility]    Anesthesia Start:  0742 Anesthesia Stop:  1023    Procedure:  ENDARTERECTOMY, CAROTID (Left Neck) Diagnosis:  (OCCLUSION AND STENOSIS OF LEFT CAROTID ARTERY)    Surgeon:  Mukund Adorno M.D. Responsible Provider:  Norm Arauz M.D.    Anesthesia Type:  general ASA Status:  2          Final Anesthesia Type: general  Last vitals  BP   Blood Pressure : 123/51, Arterial BP: 138/59    Temp   36.9 °C (98.4 °F)    Pulse   Pulse: 78   Resp   13    SpO2   94 %      Anesthesia Post Evaluation    Patient location during evaluation: PACU  Patient participation: complete - patient participated  Level of consciousness: awake and alert  Pain score: 0    Airway patency: patent  Anesthetic complications: no  Cardiovascular status: hemodynamically stable  Respiratory status: acceptable  Hydration status: euvolemic    PONV: none           Nurse Pain Score: 0 (NPRS)

## 2020-05-29 NOTE — CARE PLAN
Problem: Safety  Goal: Will remain free from injury  Outcome: PROGRESSING AS EXPECTED  Note: Safety precautions in place. Hourly rounding in place.

## 2020-05-29 NOTE — ANESTHESIA PROCEDURE NOTES
Arterial Line  Performed by: Norm Arauz M.D.  Authorized by: Norm Arauz M.D.     Start Time:  5/29/2020 7:52 AM  End Time:  5/29/2020 7:55 AM  Localization: surface landmarks    Patient Location:  OR  Indication: continuous blood pressure monitoring        Catheter Size:  20 G  Seldinger Technique?: Yes    Laterality:  Right  Site:  Radial artery  Line Secured:  Antimicrobial disc, tape and transparent dressing  Events: patient tolerated procedure well with no complications

## 2020-05-29 NOTE — ANESTHESIA QCDR
2019 [facility] Clinical Data Registry (for Quality Improvement)     Postoperative nausea/vomiting risk protocol (Adult = 18 yrs and Pediatric 3-17 yrs)- (430 and 463)  General inhalation anesthetic (NOT TIVA) with PONV risk factors: No  Provision of anti-emetic therapy with at least 2 different classes of agents: N/A  Patient DID NOT receive anti-emetic therapy and reason is documented in Medical Record: N/A    Multimodal Pain Management- (477)  Non-emergent surgery AND patient age >= 18: Yes  Use of Multimodal Pain Management, two or more drugs and/or interventions, NOT including systemic opioids: Yes  Exception: Documented allergy to multiple classes of analgesics: N/A    Smoking Abstinence (404)  Patient is current smoker (cigarette, pipe, e-cig, marijuanna): Yes  Elective Surgery: Yes  Abstinence instructions provided prior to day of surgery: Yes  Patient abstained from smoking on day of surgery: Yes    Pre-Op Beta-Blocker in Isolated CABG (44)  Isolated CABG AND patient age >= 18: No  Beta-blocker admin within 24 hours of surgical incision:   Exception:of medical reason(s) for not administering beta blocker within 24 hours prior to surgical incision (e.g., not  indicated,other medical reason):     PACU assessment of acute postoperative pain prior to Anesthesia Care End- Applies to Patients Age = 18- (ABG7)  Initial PACU pain score is which of the following: < 7/10  Patient unable to report pain score: N/A    Post-anesthetic transfer of care checklist/protocol to PACU/ICU- (426 and 427)  Upon conclusion of case, patient transferred to which of the following locations: PACU/Non-ICU  Use of transfer checklist/protocol: Yes  Exclusion: Service Performed in Patient Hospital Room (and thus did not require transfer): N/A  Unplanned admission to ICU related to anesthesia service up through end of PACU care- (MD51)  Unplanned admission to ICU (not initially anticipated at anesthesia start time): No

## 2020-05-29 NOTE — ANESTHESIA TIME REPORT
Anesthesia Start and Stop Event Times     Date Time Event    5/29/2020 0706 Ready for Procedure     0742 Anesthesia Start     1023 Anesthesia Stop        Responsible Staff  05/29/20    Name Role Begin End    Norm Arauz M.D. Anesth 0742 1023        Preop Diagnosis (Free Text):  Pre-op Diagnosis     OCCLUSION AND STENOSIS OF LEFT CAROTID ARTERY        Preop Diagnosis (Codes):    Post op Diagnosis  Occlusion and stenosis of left carotid artery      Premium Reason  Non-Premium    Comments:

## 2020-05-30 VITALS
SYSTOLIC BLOOD PRESSURE: 101 MMHG | BODY MASS INDEX: 28.15 KG/M2 | RESPIRATION RATE: 18 BRPM | OXYGEN SATURATION: 93 % | HEART RATE: 76 BPM | WEIGHT: 164.9 LBS | HEIGHT: 64 IN | DIASTOLIC BLOOD PRESSURE: 50 MMHG | TEMPERATURE: 97.6 F

## 2020-05-30 PROCEDURE — 700102 HCHG RX REV CODE 250 W/ 637 OVERRIDE(OP): Performed by: SURGERY

## 2020-05-30 PROCEDURE — 700111 HCHG RX REV CODE 636 W/ 250 OVERRIDE (IP): Performed by: SURGERY

## 2020-05-30 PROCEDURE — A9270 NON-COVERED ITEM OR SERVICE: HCPCS | Performed by: SURGERY

## 2020-05-30 RX ADMIN — HEPARIN SODIUM 5000 UNITS: 5000 INJECTION, SOLUTION INTRAVENOUS; SUBCUTANEOUS at 04:45

## 2020-05-30 RX ADMIN — HYDROXYCHLOROQUINE SULFATE 200 MG: 200 TABLET, FILM COATED ORAL at 04:46

## 2020-05-30 RX ADMIN — ACETAMINOPHEN 1000 MG: 500 TABLET ORAL at 04:05

## 2020-05-30 RX ADMIN — BISOPROLOL FUMARATE 10 MG: 5 TABLET, FILM COATED ORAL at 04:46

## 2020-05-30 RX ADMIN — PREDNISONE 10 MG: 10 TABLET ORAL at 04:46

## 2020-05-30 NOTE — PROGRESS NOTES
Progress Note  POD#1 s/p left CEA for asymptomatic disease.     Patient has no complaints.  Wants to go home.     Left neck appropriately swollen.  Tongue midline.    Impression: Great result after Left CEA.      Plan:  Discharge today and follow-up in 1-2 weeks.  [id number]    Vandana Simeon MD  Vascular Surgeon  [facility]  Office: [phone]

## 2020-05-30 NOTE — CARE PLAN
Problem: Knowledge Deficit  Goal: Knowledge of disease process/condition, treatment plan, diagnostic tests, and medications will improve  Outcome: MET  Goal: Knowledge of the prescribed therapeutic regimen will improve  Outcome: MET     Problem: Discharge Barriers/Planning  Goal: Patient's continuum of care needs will be met  Outcome: MET

## 2020-05-30 NOTE — PROGRESS NOTES
"Patient resting comfortably this am, denies need for pain medication does want coffee. Tongue midline,  equal, no deficits. Ambulating independently. MD orders reviewed, all questions answered./50   Pulse 76   Temp 36.4 °C (97.6 °F) (Temporal)   Resp 18   Ht 1.626 m (5' 4\")   Wt 74.8 kg (164 lb 14.5 oz)   SpO2 93%   BMI 28.31 kg/m²     "

## 2020-05-30 NOTE — PROGRESS NOTES
Report received. Assessment complete.  AAOx4  Pt reports pain 1/10. Declines intervention at this time. Will continue to monitor.  Pt denies N/V, SOB, or chest pain.  Tolerating regular diet.  Pt is independently ambulatory.  +void, LBM 05/29  L neck surgical incision, SHIRA with dermabond.     Plan of care discussed with patient. Fall precautions in place. Belongings and call light within reach. Educated patient to call for assistance as needed. All needs met at this time.

## 2020-05-30 NOTE — DISCHARGE INSTRUCTIONS
"YOB: 1950   Age: 69 y.o.               Admit Date: 5/29/2020     Discharge Date: 5/30/2020  Attending Doctor:  Mukund Adorno M.D.                  Allergies:  Lisinopril and Statins [hmg-coa-r inhibitors]  Medical History (as on file):   Past Medical History:   Diagnosis Date   • Allergies    • Arthritis     RA   • Bronchitis     history of   • Heart attack (HCC)     2009 stent x 1   • High cholesterol     not medicated   • Hypertension     well controlled on medications per pt   • Rhabdomyolysis    • Sleep apnea     \"extremely mild case\" per pt; does not use cpap   • Snoring     sleep study done     Past Surgical History:   Procedure Laterality Date   • PB THROMBOENDARTECTMY NECK,NECK INCIS Left 5/29/2020    Procedure: ENDARTERECTOMY, CAROTID;  Surgeon: Mukund Adorno M.D.;  Location: SURGERY [facility];  Service: Vascular   • CYST EXCISION  2014    right ankle, RA nodule    • TUBAL COAGULATION LAPAROSCOPIC BILATERAL Bilateral 1980   • STENT PLACEMENT      2009       Discharge Instructions  Blood Pressure : 101/50  Weight: 74.8 kg (164 lb 14.5 oz)    Discharged to home by car with relative. Discharged via wheelchair, hospital escort: Yes.    Special equipment needed: Not Applicable    Belongings with: Personal    Instructions:    Follow-up in 1-2 weeks with Dr. Adorno., CALL FOR APPT.     Be sure to schedule a follow-up appointment with your primary care doctor or any specialists as instructed.     Discharge Plan:   Diet Plan: Discussed  Activity Level: Discussed  Smoking Cessation Offered: Patient Refused  Confirmed Follow up Appointment: Patient to Call and Schedule Appointment  Confirmed Symptoms Management: Discussed  Medication Reconciliation Updated: Yes    DIET:  You may eat any foods that you can tolerate.  It is a good idea to eat a high fiber diet and take in plenty of fluids to prevent constipation.  If you do become constipated you may want to take a mild laxative or take ducolax tablets " on a daily basis until your bowel habits are regular.  Constipation can be very uncomfortable, along with straining, after recent surgery.    I understand that a diet low in cholesterol, fat, and sodium is recommended for good health. Unless I have been given specific instructions below for another diet, I accept this instruction as my diet prescription.       Discharge Medication Instructions:    Below are the medications your physician expects you to take upon discharge:    Review all your home medications and newly ordered medications with your doctor and/or pharmacist. Follow medication instructions as directed by your doctor and/or pharmacist.  GENERAL POST-OPERATIVE  PATIENT INSTRUCTIONS      FOLLOW-UP:  Please call your physician/ return to ER if you have any fevers greater than 100.4, drainage from your wound that is not clear or looks infected, persistent bleeding, increasing abdominal pain, problems urinating, or persistent nausea/vomiting.      WOUND CARE INSTRUCTIONS:  Keep a dry clean dressing on the wound if there is drainage. The initial bandage may be removed after 24 hours.  Once the wound has quit draining you may leave it open to air.  If clothing rubs against the wound or causes irritation and the wound is not draining you may cover it with a dry dressing during the daytime.  Try to keep the wound dry and avoid ointments on the wound unless directed to do so.  If the wound becomes bright red and painful or starts to drain infected material that is not clear, please contact your physician immediately.  If the wound is mildly pink and has a thick firm ridge underneath it, this is normal, and is referred to as a healing ridge.  This will resolve over the next 4-6 weeks. No baths, hot tubs, swimming, no submerging incisions, unit til healed.     ACTIVITY:  You are encouraged to cough and deep breath or use your incentive spirometer if you were given one, every 15-30 minutes when awake.  This will  help prevent respiratory complications and low grade fevers post-operatively if you had a general anesthetic.  You may want to hug a pillow when coughing and sneezing to add additional support to the surgical area, if you had abdominal or chest surgery, which will decrease pain during these times.  You are encouraged to walk and engage in light activity for the next two weeks.  You should not lift more than 10 pounds during this time frame as it could put you at increased risk for complications.      MEDICATIONS:  Try to take narcotic medications and anti-inflammatory medications, such as tylenol, ibuprofen, naprosyn, etc., with food.  This will minimize stomach upset from the medication.  Should you develop nausea and vomiting from the pain medication, or develop a rash, please discontinue the medication and contact your physician.  You should not drive, make important decisions, or operate machinery when taking narcotic pain medication.    QUESTIONS:  Please feel free to call your physician or the hospital  if you have any questions, and they will be glad to assist you.       Discharge Instructions      Special Instructions: None    · Is patient discharged on Warfarin / Coumadin?   No     Depression / Suicide Risk    As you are discharged from this [facility] facility, it is important to learn how to keep safe from harming yourself.    Recognize the warning signs:  · Abrupt changes in personality, positive or negative- including increase in energy   · Giving away possessions  · Change in eating patterns- significant weight changes-  positive or negative  · Change in sleeping patterns- unable to sleep or sleeping all the time   · Unwillingness or inability to communicate  · Depression  · Unusual sadness, discouragement and loneliness  · Talk of wanting to die  · Neglect of personal appearance   · Rebelliousness- reckless behavior  · Withdrawal from people/activities they love  · Confusion- inability to  concentrate     If you or a loved one observes any of these behaviors or has concerns about self-harm, here's what you can do:  · Talk about it- your feelings and reasons for harming yourself  · Remove any means that you might use to hurt yourself (examples: pills, rope, extension cords, firearm)  · Get professional help from the community (Mental Health, Substance Abuse, psychological counseling)  · Do not be alone:Call your Safe Contact- someone whom you trust who will be there for you.  · Call your local CRISIS HOTLINE 487-4386 or 608-084-9405  · Call your local Children's Mobile Crisis Response Team [place] (956) 778-1715 or www.Arkivum  · Call the toll free National Suicide Prevention Hotlines   · National Suicide Prevention Lifeline 755-729-PZEF (9306)  · National Hope Line Network 800-SUICIDE (896-4929)

## 2020-05-30 NOTE — PROGRESS NOTES
Discharging Patient home per physician order.  Discharged with family.  Demonstrated understanding of discharge instructions, follow up appointments, home medications, prescriptions, home care for surgical wound, and nursing care instructions for post-op carotidendarectomy.  Ambulating without assistance, voiding without difficulty, pain well controlled, tolerating oral medications, oxygen saturation greater than 90% , tolerating diet.   Educational handouts KRames  given and discussed.  Verbalized understanding of discharge instructions and educational handouts.  All questions answered.  Belongings with patient at time of discharge.

## 2020-05-31 NOTE — DISCHARGE SUMMARY
DISCHARGE DIAGNOSES:  1.  Bilateral asymptomatic carotid stenosis.  2.  Hypertension.    OPERATION PERFORMED THIS HOSPITALIZATION:   Left carotid endarterectomy.    BRIEF SUMMARY:  This 69-year-old woman was admitted for elective carotid   endarterectomy.  Her left carotid artery has an 80-99% stenosis on duplex   performed in our office in March.  She also has moderate bilateral subclavian   stenoses and a moderate right internal carotid stenosis.  She underwent   carotid endarterectomy by Dr. Adorno and had an uncomplicated course.  After a   brief interval in the recovery area, she was transferred to the floor.  She   has required no pain medications and did not want a prescription for home.    PHYSICAL EXAMINATION:  She is neurologically intact.  Her tongue is midline.    Left neck soft and appropriately swollen.    MEDICATIONS ON DISCHARGE:  Aspirin 81 mg daily, amlodipine 5 mg b.i.d.,   albuterol inhaler as prescribed, bisoprolol fumarate 10 mg 1 tablet daily,   prednisone 10 mg twice weekly, methotrexate 2.5 mg once a week, folic acid 1   mg a day, hydrochlorothiazide 12.5 mg b.i.d.    She was instructed to call the office for followup within a week or 2 for a   wound check.       ____________________________________     MD DIANE Burris / NAVEEN    DD:  05/30/2020 11:46:57  DT:  05/31/2020 03:32:01    D#:  [id number]  Job#:  [id number]

## 2020-07-17 ENCOUNTER — HOSPITAL ENCOUNTER (OUTPATIENT)
Dept: LAB | Facility: MEDICAL CENTER | Age: 70
End: 2020-07-17
Attending: INTERNAL MEDICINE
Payer: MEDICARE

## 2020-07-17 LAB
ALBUMIN SERPL BCP-MCNC: 4.2 G/DL (ref 3.2–4.9)
ALBUMIN/GLOB SERPL: 1.4 G/DL
ALP SERPL-CCNC: 65 U/L (ref 30–99)
ALT SERPL-CCNC: 16 U/L (ref 2–50)
ANION GAP SERPL CALC-SCNC: 11 MMOL/L (ref 7–16)
AST SERPL-CCNC: 12 U/L (ref 12–45)
BASOPHILS # BLD AUTO: 0.4 % (ref 0–1.8)
BASOPHILS # BLD: 0.05 K/UL (ref 0–0.12)
BILIRUB SERPL-MCNC: 0.2 MG/DL (ref 0.1–1.5)
BUN SERPL-MCNC: 15 MG/DL (ref 8–22)
CALCIUM SERPL-MCNC: 9.2 MG/DL (ref 8.5–10.5)
CHLORIDE SERPL-SCNC: 98 MMOL/L (ref 96–112)
CK SERPL-CCNC: 82 U/L (ref 0–154)
CO2 SERPL-SCNC: 25 MMOL/L (ref 20–33)
CREAT SERPL-MCNC: 0.77 MG/DL (ref 0.5–1.4)
CRP SERPL HS-MCNC: 0.5 MG/DL (ref 0–0.75)
EOSINOPHIL # BLD AUTO: 0.36 K/UL (ref 0–0.51)
EOSINOPHIL NFR BLD: 2.9 % (ref 0–6.9)
ERYTHROCYTE [DISTWIDTH] IN BLOOD BY AUTOMATED COUNT: 47.7 FL (ref 35.9–50)
ERYTHROCYTE [SEDIMENTATION RATE] IN BLOOD BY WESTERGREN METHOD: 17 MM/HOUR (ref 0–30)
GLOBULIN SER CALC-MCNC: 3 G/DL (ref 1.9–3.5)
GLUCOSE SERPL-MCNC: 96 MG/DL (ref 65–99)
HCT VFR BLD AUTO: 44.5 % (ref 37–47)
HGB BLD-MCNC: 14.4 G/DL (ref 12–16)
IMM GRANULOCYTES # BLD AUTO: 0.07 K/UL (ref 0–0.11)
IMM GRANULOCYTES NFR BLD AUTO: 0.6 % (ref 0–0.9)
LDH SERPL L TO P-CCNC: 214 U/L (ref 107–266)
LYMPHOCYTES # BLD AUTO: 1.92 K/UL (ref 1–4.8)
LYMPHOCYTES NFR BLD: 15.4 % (ref 22–41)
MCH RBC QN AUTO: 30.2 PG (ref 27–33)
MCHC RBC AUTO-ENTMCNC: 32.4 G/DL (ref 33.6–35)
MCV RBC AUTO: 93.3 FL (ref 81.4–97.8)
MONOCYTES # BLD AUTO: 1.25 K/UL (ref 0–0.85)
MONOCYTES NFR BLD AUTO: 10 % (ref 0–13.4)
NEUTROPHILS # BLD AUTO: 8.8 K/UL (ref 2–7.15)
NEUTROPHILS NFR BLD: 70.7 % (ref 44–72)
NRBC # BLD AUTO: 0 K/UL
NRBC BLD-RTO: 0 /100 WBC
PLATELET # BLD AUTO: 300 K/UL (ref 164–446)
PMV BLD AUTO: 9.9 FL (ref 9–12.9)
POTASSIUM SERPL-SCNC: 4.7 MMOL/L (ref 3.6–5.5)
PROT SERPL-MCNC: 7.2 G/DL (ref 6–8.2)
RBC # BLD AUTO: 4.77 M/UL (ref 4.2–5.4)
SODIUM SERPL-SCNC: 134 MMOL/L (ref 135–145)
WBC # BLD AUTO: 12.5 K/UL (ref 4.8–10.8)

## 2020-07-17 PROCEDURE — 86140 C-REACTIVE PROTEIN: CPT

## 2020-07-17 PROCEDURE — 83615 LACTATE (LD) (LDH) ENZYME: CPT

## 2020-07-17 PROCEDURE — 36415 COLL VENOUS BLD VENIPUNCTURE: CPT

## 2020-07-17 PROCEDURE — 82550 ASSAY OF CK (CPK): CPT

## 2020-07-17 PROCEDURE — 85652 RBC SED RATE AUTOMATED: CPT

## 2020-07-17 PROCEDURE — 85025 COMPLETE CBC W/AUTO DIFF WBC: CPT

## 2020-07-17 PROCEDURE — 80053 COMPREHEN METABOLIC PANEL: CPT

## 2020-08-28 ENCOUNTER — HOSPITAL ENCOUNTER (OUTPATIENT)
Dept: LAB | Facility: MEDICAL CENTER | Age: 70
End: 2020-08-28
Attending: NURSE PRACTITIONER
Payer: MEDICARE

## 2020-08-28 LAB
ALBUMIN SERPL BCP-MCNC: 4.2 G/DL (ref 3.2–4.9)
ALBUMIN/GLOB SERPL: 1.3 G/DL
ALP SERPL-CCNC: 66 U/L (ref 30–99)
ALT SERPL-CCNC: 20 U/L (ref 2–50)
ANION GAP SERPL CALC-SCNC: 12 MMOL/L (ref 7–16)
AST SERPL-CCNC: 15 U/L (ref 12–45)
BILIRUB SERPL-MCNC: 0.2 MG/DL (ref 0.1–1.5)
BUN SERPL-MCNC: 13 MG/DL (ref 8–22)
CALCIUM SERPL-MCNC: 9.2 MG/DL (ref 8.5–10.5)
CHLORIDE SERPL-SCNC: 97 MMOL/L (ref 96–112)
CK SERPL-CCNC: 97 U/L (ref 0–154)
CO2 SERPL-SCNC: 26 MMOL/L (ref 20–33)
CREAT SERPL-MCNC: 0.83 MG/DL (ref 0.5–1.4)
CRP SERPL HS-MCNC: 0.79 MG/DL (ref 0–0.75)
ERYTHROCYTE [SEDIMENTATION RATE] IN BLOOD BY WESTERGREN METHOD: 20 MM/HOUR (ref 0–30)
GLOBULIN SER CALC-MCNC: 3.3 G/DL (ref 1.9–3.5)
GLUCOSE SERPL-MCNC: 99 MG/DL (ref 65–99)
POTASSIUM SERPL-SCNC: 5.1 MMOL/L (ref 3.6–5.5)
PROT SERPL-MCNC: 7.5 G/DL (ref 6–8.2)
SODIUM SERPL-SCNC: 135 MMOL/L (ref 135–145)

## 2020-08-28 PROCEDURE — 82550 ASSAY OF CK (CPK): CPT

## 2020-08-28 PROCEDURE — 36415 COLL VENOUS BLD VENIPUNCTURE: CPT

## 2020-08-28 PROCEDURE — 85652 RBC SED RATE AUTOMATED: CPT

## 2020-08-28 PROCEDURE — 86140 C-REACTIVE PROTEIN: CPT

## 2020-08-28 PROCEDURE — 80053 COMPREHEN METABOLIC PANEL: CPT

## 2020-09-21 NOTE — PROGRESS NOTES
Chemotherapy Verification - SECONDARY RN       Height = 64 inches  Weight = 75.5 kg  BSA = 1.84 m2       Medication: Rituximab (Rituxan)  Dose: 1000 mg  Calculated Dose: 1000 mg; set dose, no calculation required.                              (In mg/m2, AUC, mg/kg)       I confirm that this process was performed independently.    OFFICE VISIT      Patient: Phyllis Ashton Date of Service: 2020    : 1934 MRN: [id number]     SUBJECTIVE:   HISTORY OF PRESENT ILLNESS:  Phyllis Ashton is a 85 year old female who presents today for   Chief Complaint   Patient presents with   • Follow-up     1 year   • Palpitations          Mrs Ashton is a 86 yo W, here for 6 month followup, for evaluation of AR and thoracic aortic aneurysm. Her aneurysm has increased to 47 mm max diameter. prior CSA was 15.4 cm2. No CVA or TIA symptoms. She denies cv symptoms with moderate activities. She is known to have some degree of AR and a thoracic aortic aneurysm since at least . Subsequent echoes showed moderate AR, as size by echo was reported stable. However recent echo has moderate-severe AR She has good compliance with all her medications. no palpitations reported no syncope episodes. She states compliance with meds. Her BP at home runs 110-120 mmHg. her lipids are good.   She has noted several episodes of palpitations with occasional episodes lasting 10 to 30 minutes. An event monitor was unremarkable.  No syncope.  No CVA symptoms.  Palpitations happened again.   She had a fall due to loose rug.   Labs done with dr Pedraza pending.     PAST MEDICAL HISTORY:  No past medical history on file.    MEDICATIONS:  Current Outpatient Medications   Medication Sig   • L-Methylfolate-Algae (DEPLIN 7.5 PO)    • Multiple Vitamins-Minerals (ICAPS AREDS 2 PO)    • cholecalciferol (VITAMIN D3) 1000 UNITS tablet Take 1,000 Units by mouth daily.   • Misc Natural Products (OSTEO BI-FLEX JOINT SHIELD) Tab Take by mouth 2 times daily.   • metoPROLOL succinate (TOPROL-XL) 25 MG 24 hr tablet Take 25 mg by mouth daily. THIS IS A DECREASE   • amLODIPine (NORVASC) 2.5 MG tablet Take 2.5 mg by mouth daily. Indications: 3 tablets daily    • aspirin 81 MG tablet Take 81 mg by mouth daily.   • atorvastatin (LIPITOR) 10 MG tablet Take 10 mg by mouth daily.    • calcium  citrate-vitamin D (CITRACAL+D) 315-250 MG-UNIT per tablet Take 1 tablet by mouth 2 times daily.   • clonazePAM (KLONOPIN) 0.5 MG tablet Take 0.25 mg by mouth nightly as needed for Anxiety.   • lithium (ESKALITH) 450 MG CR tablet Take 450 mg by mouth daily.   • Omega 3 1000 MG capsule Take 1,000 mg by mouth daily.   • nortriptyline (PAMELOR) 10 MG capsule Take 10 mg by mouth nightly. Twice daily   • levothyroxine (SYNTHROID, LEVOTHROID) 25 MCG tablet Take 25 mcg by mouth daily.   • vitamin E 400 UNIT capsule Take 400 Units by mouth daily.     No current facility-administered medications for this visit.        ALLERGIES:  ALLERGIES:   Allergen Reactions   • Gadolinium Derivatives Other (See Comments)     Unknown     • Sulfa Antibiotics Other (See Comments)     Unknown         PAST SURGICAL HISTORY:  No past surgical history on file.    FAMILY HISTORY:  Family History   Problem Relation Age of Onset   • Heart disease Mother    • Heart disease Father        SOCIAL HISTORY:  Social History     Tobacco Use   • Smoking status: Never Smoker   • Smokeless tobacco: Never Used   Substance Use Topics   • Alcohol use: Yes     Comment: rarely   • Drug use: Not on file       ROS     All other systems reviewed and are negative.    OBJECTIVE:     Physical Exam    Constitutional: He is oriented to person, place, and time. He appears well-developed and well-nourished. No distress.   HENT:   Head: Normocephalic and atraumatic.   Mouth/Throat: Oropharynx is clear and moist.   Eyes: Conjunctivae and EOM are normal. Pupils are equal, round, and reactive to light.   Neck: Normal range of motion. Neck supple. No JVD present. No thyromegaly present.   Cardiovascular: Normal rate, regular rhythm, normal heart sounds and intact distal pulses.   Pulmonary/Chest: Effort normal and breath sounds normal. He has no wheezes. He has no rales.   Abdominal: Soft. Bowel sounds are normal. There is no tenderness. There is no rebound.   Musculoskeletal:  Normal range of motion.   Lymphadenopathy:        Right cervical: No superficial cervical adenopathy present.       Left cervical: No superficial cervical adenopathy present.   Neurological: He is alert and oriented to person, place, and time. He has normal reflexes.   Skin: Skin is warm, dry and intact.   Psychiatric: He has a normal mood and affect. His behavior is normal. Thought content normal.       Visit Vitals  /55 (BP Location: LUE - Left upper extremity, Patient Position: Sitting, Cuff Size: Pediatric)   Pulse (!) 57   Ht 4' 9.48\" (1.46 m)   Wt 39.8 kg (87 lb 11.9 oz)   SpO2 98%   BMI 18.67 kg/m²     Body mass index is 18.67 kg/m².  Wt Readings from Last 4 Encounters:   09/21/20 39.8 kg (87 lb 11.9 oz)   09/30/19 40.9 kg (90 lb 4.5 oz)   06/03/19 40.8 kg (89 lb 13.4 oz)   12/03/18 40.9 kg (90 lb 0.9 oz)       Vital Signs and previous readings were reviewed during today's visit    DIAGNOSTIC STUDIES:   LAB RESULTS:  No results for input(s): CHOLESTEROL, HDL, TRIGLYCERIDE, CALCLDL, LDLDIR, NONHDL in the last 8765 hours.    No results for input(s): SODIUM, CHLORIDE, BUN, GFRA, BCRAT, POTASSIUM, C02, GLUCOSE, CREATININE, GFRNA, CALCIUM, BNP, TSH in the last 8765 hours.    Invalid input(s): AGAP, FST4    No results for input(s): WBC, RBC, HGB, HCT, MCV, MCHC, RDWCV, PLT, TLYMPH in the last 8765 hours.    No results for input(s): DBIL, GPT, TP in the last 8765 hours.    Invalid input(s): ALB, TBIL, ALKP, GOT     No results for input(s): NTPROB in the last 8765 hours.     Lab Results Reviewed     2019: CT chest: Asc Ao aneurysm 47 mm (stable), Cor Ca    The ASCVD Risk score (Hilariashai BENÍTEZ Jr., et al., 2013) failed to calculate for the following reasons:    The 2013 ASCVD risk score is only valid for ages 40 to 79    Patient's chart was reviewed for previous cardiology lab work and testing. Pertinent findings were reviewed with the patient during today's visit.    ASSESSMENT AND PLAN:   This is a 85 year old  year-old female who presents with:     Benign hypertension  she needs SBP < 130 given the aneurysm. advised to keep a log and call if she detects high BP values. CPM     Nonrheumatic aortic (valve) insufficiency  moderate-severe. asymptomatic. normal LV size and function. monitor by echo every 1 yr, next in 2020.     Thoracic ascending aortic aneurysm (CMS/HCC)  She has a thoracic aortic aneurysm, that possibly stable, though latest CT now reports 47 mm. to confirm size with repeat noncontrast CT chest every 6 months and calculate max diameter to see if > 55 mm, her CSA/Ht is already >10; this suggests need for closer followup and/or intervention. Her BSA is only 1.33 and for this the upper normal limit of aortic root size is 30 mm. The cause of the aneurysm appears to be degenerative, as she lacks any other clear genetic or inflammatory causes. There is family predisposition to aneurysm in about 15% of cases, so first degree relative should be screened with at least an echocardiogram. Her daughter was screened and found negative by echo. treatment with BP meds and/or statins is suggested in the current guidelines. if there is any further increase, will have her see CV surgery. followup CT chest without contrast in December 2019     Dyslipidemia  her ASCVD risk is high, given TIA, then will need aspirin 81 mg daily and statin for LDL goal 40-60. continue atorvastatin 20 mg daily. followup lipids annually. Her ASCVD risk is > 30%. Continue with aspirin.      History of TIA (transient ischemic attack)  workup was negative. continue with preventive measures. Continue with aspirin    Need for aspirin given prior TIA and significant coronary calcification. If she has falls then can stop. Prevention of falls discussed.     Instructions provided as documented in the AVS.    The patient indicated understanding of the diagnosis and agreed with the plan of care.      Greater than 50% of the visit was spent counseling  regarding above issues; total time spent 20 minutes.

## 2020-10-07 ENCOUNTER — HOSPITAL ENCOUNTER (OUTPATIENT)
Dept: LAB | Facility: MEDICAL CENTER | Age: 70
End: 2020-10-07
Attending: NURSE PRACTITIONER
Payer: MEDICARE

## 2020-10-07 LAB
ALBUMIN SERPL BCP-MCNC: 4 G/DL (ref 3.2–4.9)
ALBUMIN/GLOB SERPL: 1.2 G/DL
ALP SERPL-CCNC: 77 U/L (ref 30–99)
ALT SERPL-CCNC: 19 U/L (ref 2–50)
ANION GAP SERPL CALC-SCNC: 11 MMOL/L (ref 7–16)
AST SERPL-CCNC: 15 U/L (ref 12–45)
BASOPHILS # BLD AUTO: 0.8 % (ref 0–1.8)
BASOPHILS # BLD: 0.05 K/UL (ref 0–0.12)
BILIRUB SERPL-MCNC: 0.2 MG/DL (ref 0.1–1.5)
BUN SERPL-MCNC: 14 MG/DL (ref 8–22)
CALCIUM SERPL-MCNC: 8.9 MG/DL (ref 8.5–10.5)
CHLORIDE SERPL-SCNC: 100 MMOL/L (ref 96–112)
CK SERPL-CCNC: 92 U/L (ref 0–154)
CO2 SERPL-SCNC: 27 MMOL/L (ref 20–33)
CREAT SERPL-MCNC: 0.87 MG/DL (ref 0.5–1.4)
CRP SERPL HS-MCNC: 0.61 MG/DL (ref 0–0.75)
EOSINOPHIL # BLD AUTO: 0.29 K/UL (ref 0–0.51)
EOSINOPHIL NFR BLD: 4.6 % (ref 0–6.9)
ERYTHROCYTE [DISTWIDTH] IN BLOOD BY AUTOMATED COUNT: 41.6 FL (ref 35.9–50)
ERYTHROCYTE [SEDIMENTATION RATE] IN BLOOD BY WESTERGREN METHOD: 23 MM/HOUR (ref 0–30)
GLOBULIN SER CALC-MCNC: 3.3 G/DL (ref 1.9–3.5)
GLUCOSE SERPL-MCNC: 100 MG/DL (ref 65–99)
HCT VFR BLD AUTO: 44.8 % (ref 37–47)
HGB BLD-MCNC: 14.9 G/DL (ref 12–16)
IMM GRANULOCYTES # BLD AUTO: 0.01 K/UL (ref 0–0.11)
IMM GRANULOCYTES NFR BLD AUTO: 0.2 % (ref 0–0.9)
LYMPHOCYTES # BLD AUTO: 1.31 K/UL (ref 1–4.8)
LYMPHOCYTES NFR BLD: 20.8 % (ref 22–41)
MCH RBC QN AUTO: 29.7 PG (ref 27–33)
MCHC RBC AUTO-ENTMCNC: 33.3 G/DL (ref 33.6–35)
MCV RBC AUTO: 89.4 FL (ref 81.4–97.8)
MONOCYTES # BLD AUTO: 0.71 K/UL (ref 0–0.85)
MONOCYTES NFR BLD AUTO: 11.3 % (ref 0–13.4)
NEUTROPHILS # BLD AUTO: 3.92 K/UL (ref 2–7.15)
NEUTROPHILS NFR BLD: 62.3 % (ref 44–72)
NRBC # BLD AUTO: 0 K/UL
NRBC BLD-RTO: 0 /100 WBC
PLATELET # BLD AUTO: 284 K/UL (ref 164–446)
PMV BLD AUTO: 9.9 FL (ref 9–12.9)
POTASSIUM SERPL-SCNC: 4.1 MMOL/L (ref 3.6–5.5)
PROT SERPL-MCNC: 7.3 G/DL (ref 6–8.2)
RBC # BLD AUTO: 5.01 M/UL (ref 4.2–5.4)
SODIUM SERPL-SCNC: 138 MMOL/L (ref 135–145)
WBC # BLD AUTO: 6.3 K/UL (ref 4.8–10.8)

## 2020-10-07 PROCEDURE — 86140 C-REACTIVE PROTEIN: CPT

## 2020-10-07 PROCEDURE — 85652 RBC SED RATE AUTOMATED: CPT

## 2020-10-07 PROCEDURE — 80053 COMPREHEN METABOLIC PANEL: CPT

## 2020-10-07 PROCEDURE — 82550 ASSAY OF CK (CPK): CPT

## 2020-10-07 PROCEDURE — 85025 COMPLETE CBC W/AUTO DIFF WBC: CPT

## 2020-10-07 PROCEDURE — 36415 COLL VENOUS BLD VENIPUNCTURE: CPT

## 2020-10-21 ENCOUNTER — OUTPATIENT INFUSION SERVICES (OUTPATIENT)
Dept: ONCOLOGY | Facility: MEDICAL CENTER | Age: 70
End: 2020-10-21
Attending: INTERNAL MEDICINE
Payer: MEDICARE

## 2020-10-21 VITALS
TEMPERATURE: 97.6 F | SYSTOLIC BLOOD PRESSURE: 174 MMHG | BODY MASS INDEX: 30.15 KG/M2 | WEIGHT: 176.59 LBS | DIASTOLIC BLOOD PRESSURE: 67 MMHG | HEART RATE: 71 BPM | OXYGEN SATURATION: 97 % | HEIGHT: 64 IN | RESPIRATION RATE: 18 BRPM

## 2020-10-21 DIAGNOSIS — M06.09 RHEUMATOID ARTHRITIS OF MULTIPLE SITES WITHOUT RHEUMATOID FACTOR (HCC): ICD-10-CM

## 2020-10-21 PROCEDURE — 96415 CHEMO IV INFUSION ADDL HR: CPT

## 2020-10-21 PROCEDURE — A9270 NON-COVERED ITEM OR SERVICE: HCPCS | Performed by: INTERNAL MEDICINE

## 2020-10-21 PROCEDURE — 96413 CHEMO IV INFUSION 1 HR: CPT

## 2020-10-21 PROCEDURE — 700102 HCHG RX REV CODE 250 W/ 637 OVERRIDE(OP): Performed by: INTERNAL MEDICINE

## 2020-10-21 PROCEDURE — 96375 TX/PRO/DX INJ NEW DRUG ADDON: CPT

## 2020-10-21 PROCEDURE — 700105 HCHG RX REV CODE 258: Performed by: INTERNAL MEDICINE

## 2020-10-21 PROCEDURE — 700111 HCHG RX REV CODE 636 W/ 250 OVERRIDE (IP): Performed by: INTERNAL MEDICINE

## 2020-10-21 RX ORDER — METHYLPREDNISOLONE SODIUM SUCCINATE 125 MG/2ML
100 INJECTION, POWDER, LYOPHILIZED, FOR SOLUTION INTRAMUSCULAR; INTRAVENOUS ONCE
Status: COMPLETED | OUTPATIENT
Start: 2020-10-21 | End: 2020-10-21

## 2020-10-21 RX ORDER — ACETAMINOPHEN 325 MG/1
650 TABLET ORAL ONCE
Status: COMPLETED | OUTPATIENT
Start: 2020-10-21 | End: 2020-10-21

## 2020-10-21 RX ADMIN — METHYLPREDNISOLONE SODIUM SUCCINATE 100 MG: 125 INJECTION, POWDER, FOR SOLUTION INTRAMUSCULAR; INTRAVENOUS at 09:24

## 2020-10-21 RX ADMIN — RITUXIMAB 1000 MG: 10 INJECTION, SOLUTION INTRAVENOUS at 09:51

## 2020-10-21 RX ADMIN — ACETAMINOPHEN 650 MG: 325 TABLET ORAL at 09:20

## 2020-10-21 RX ADMIN — DIPHENHYDRAMINE HYDROCHLORIDE 25 MG: 50 INJECTION, SOLUTION INTRAMUSCULAR; INTRAVENOUS at 09:26

## 2020-10-21 ASSESSMENT — FIBROSIS 4 INDEX: FIB4 SCORE: 0.82

## 2020-10-21 NOTE — PROGRESS NOTES
Patient arrived ambulatory to [facility] for Rituxan.  She denies any s/s of infection or fevers.  PIV established with good blood return noted.  Pre medications given and Rituxan infused at subsequent rate.  Pt tolerated well.  PIV flushed, removed, and gauze/coban placed.  New orders needed from MD for next infusion.  Pt ambulated out of clinic in no apparent distress.

## 2020-10-21 NOTE — PROGRESS NOTES
Chemotherapy Verification - SECONDARY RN       Height = 162.5 cm  Weight = 80.1 kg  BSA = 1.9 m2       Medication: Rituxan  Dose: 1,000 mg set dose  Calculated Dose: 1,000 mg set dose                             (In mg/m2, AUC, mg/kg)         I confirm that this process was performed independently.

## 2020-10-21 NOTE — PROGRESS NOTES
Chemotherapy Verification - PRIMARY RN      Height = 162.5 cm  Weight = 80.1 kg  BSA = 1.9 m2       Medication: Rituxan  Dose: 1000 mg set dose  Calculated Dose: 1000 mg                            I confirm this process was performed independently with the BSA and all final chemotherapy dosing calculations congruent.  Any discrepancies of 10% or greater have been addressed with the chemotherapy pharmacist. The resolution of the discrepancy has been documented in the EPIC progress notes.

## 2020-11-06 ENCOUNTER — HOSPITAL ENCOUNTER (OUTPATIENT)
Dept: LAB | Facility: MEDICAL CENTER | Age: 70
End: 2020-11-06
Attending: SURGERY
Payer: MEDICARE

## 2020-11-06 LAB
ANION GAP SERPL CALC-SCNC: 10 MMOL/L (ref 7–16)
BASOPHILS # BLD AUTO: 0.7 % (ref 0–1.8)
BASOPHILS # BLD: 0.06 K/UL (ref 0–0.12)
BUN SERPL-MCNC: 9 MG/DL (ref 8–22)
CALCIUM SERPL-MCNC: 9.5 MG/DL (ref 8.5–10.5)
CHLORIDE SERPL-SCNC: 99 MMOL/L (ref 96–112)
CO2 SERPL-SCNC: 27 MMOL/L (ref 20–33)
CREAT SERPL-MCNC: 0.68 MG/DL (ref 0.5–1.4)
EOSINOPHIL # BLD AUTO: 0.21 K/UL (ref 0–0.51)
EOSINOPHIL NFR BLD: 2.5 % (ref 0–6.9)
ERYTHROCYTE [DISTWIDTH] IN BLOOD BY AUTOMATED COUNT: 43.8 FL (ref 35.9–50)
GLUCOSE SERPL-MCNC: 94 MG/DL (ref 65–99)
HCT VFR BLD AUTO: 45.4 % (ref 37–47)
HGB BLD-MCNC: 14.8 G/DL (ref 12–16)
IMM GRANULOCYTES # BLD AUTO: 0.03 K/UL (ref 0–0.11)
IMM GRANULOCYTES NFR BLD AUTO: 0.4 % (ref 0–0.9)
LYMPHOCYTES # BLD AUTO: 2.12 K/UL (ref 1–4.8)
LYMPHOCYTES NFR BLD: 25.4 % (ref 22–41)
MCH RBC QN AUTO: 29.2 PG (ref 27–33)
MCHC RBC AUTO-ENTMCNC: 32.6 G/DL (ref 33.6–35)
MCV RBC AUTO: 89.5 FL (ref 81.4–97.8)
MONOCYTES # BLD AUTO: 1.02 K/UL (ref 0–0.85)
MONOCYTES NFR BLD AUTO: 12.2 % (ref 0–13.4)
NEUTROPHILS # BLD AUTO: 4.92 K/UL (ref 2–7.15)
NEUTROPHILS NFR BLD: 58.8 % (ref 44–72)
NRBC # BLD AUTO: 0 K/UL
NRBC BLD-RTO: 0 /100 WBC
PLATELET # BLD AUTO: 232 K/UL (ref 164–446)
PMV BLD AUTO: 10.2 FL (ref 9–12.9)
POTASSIUM SERPL-SCNC: 4.5 MMOL/L (ref 3.6–5.5)
RBC # BLD AUTO: 5.07 M/UL (ref 4.2–5.4)
SODIUM SERPL-SCNC: 136 MMOL/L (ref 135–145)
WBC # BLD AUTO: 8.4 K/UL (ref 4.8–10.8)

## 2020-11-06 PROCEDURE — 85025 COMPLETE CBC W/AUTO DIFF WBC: CPT

## 2020-11-06 PROCEDURE — 36415 COLL VENOUS BLD VENIPUNCTURE: CPT

## 2020-11-06 PROCEDURE — 80048 BASIC METABOLIC PNL TOTAL CA: CPT

## 2020-12-01 ENCOUNTER — HOSPITAL ENCOUNTER (OUTPATIENT)
Dept: LAB | Facility: MEDICAL CENTER | Age: 70
End: 2020-12-01
Attending: INTERNAL MEDICINE
Payer: MEDICARE

## 2020-12-01 LAB
ALBUMIN SERPL BCP-MCNC: 4.5 G/DL (ref 3.2–4.9)
ALBUMIN/GLOB SERPL: 1.4 G/DL
ALP SERPL-CCNC: 104 U/L (ref 30–99)
ALT SERPL-CCNC: 17 U/L (ref 2–50)
ANION GAP SERPL CALC-SCNC: 10 MMOL/L (ref 7–16)
AST SERPL-CCNC: 15 U/L (ref 12–45)
BASOPHILS # BLD AUTO: 0.9 % (ref 0–1.8)
BASOPHILS # BLD: 0.08 K/UL (ref 0–0.12)
BILIRUB SERPL-MCNC: 0.4 MG/DL (ref 0.1–1.5)
BUN SERPL-MCNC: 9 MG/DL (ref 8–22)
CALCIUM SERPL-MCNC: 9.5 MG/DL (ref 8.5–10.5)
CHLORIDE SERPL-SCNC: 95 MMOL/L (ref 96–112)
CK SERPL-CCNC: 71 U/L (ref 0–154)
CO2 SERPL-SCNC: 25 MMOL/L (ref 20–33)
CREAT SERPL-MCNC: 0.73 MG/DL (ref 0.5–1.4)
CRP SERPL HS-MCNC: 1.46 MG/DL (ref 0–0.75)
EOSINOPHIL # BLD AUTO: 0.83 K/UL (ref 0–0.51)
EOSINOPHIL NFR BLD: 9.2 % (ref 0–6.9)
ERYTHROCYTE [DISTWIDTH] IN BLOOD BY AUTOMATED COUNT: 42.7 FL (ref 35.9–50)
ERYTHROCYTE [SEDIMENTATION RATE] IN BLOOD BY WESTERGREN METHOD: 22 MM/HOUR (ref 0–30)
GLOBULIN SER CALC-MCNC: 3.2 G/DL (ref 1.9–3.5)
GLUCOSE SERPL-MCNC: 92 MG/DL (ref 65–99)
HCT VFR BLD AUTO: 43.8 % (ref 37–47)
HGB BLD-MCNC: 14.9 G/DL (ref 12–16)
IMM GRANULOCYTES # BLD AUTO: 0.03 K/UL (ref 0–0.11)
IMM GRANULOCYTES NFR BLD AUTO: 0.3 % (ref 0–0.9)
LDH SERPL L TO P-CCNC: 201 U/L (ref 107–266)
LYMPHOCYTES # BLD AUTO: 1.39 K/UL (ref 1–4.8)
LYMPHOCYTES NFR BLD: 15.4 % (ref 22–41)
MCH RBC QN AUTO: 29.9 PG (ref 27–33)
MCHC RBC AUTO-ENTMCNC: 34 G/DL (ref 33.6–35)
MCV RBC AUTO: 88 FL (ref 81.4–97.8)
MONOCYTES # BLD AUTO: 0.95 K/UL (ref 0–0.85)
MONOCYTES NFR BLD AUTO: 10.5 % (ref 0–13.4)
NEUTROPHILS # BLD AUTO: 5.74 K/UL (ref 2–7.15)
NEUTROPHILS NFR BLD: 63.7 % (ref 44–72)
NRBC # BLD AUTO: 0 K/UL
NRBC BLD-RTO: 0 /100 WBC
PLATELET # BLD AUTO: 310 K/UL (ref 164–446)
PMV BLD AUTO: 9.2 FL (ref 9–12.9)
POTASSIUM SERPL-SCNC: 4.2 MMOL/L (ref 3.6–5.5)
PROT SERPL-MCNC: 7.7 G/DL (ref 6–8.2)
RBC # BLD AUTO: 4.98 M/UL (ref 4.2–5.4)
SODIUM SERPL-SCNC: 130 MMOL/L (ref 135–145)
WBC # BLD AUTO: 9 K/UL (ref 4.8–10.8)

## 2020-12-01 PROCEDURE — 83615 LACTATE (LD) (LDH) ENZYME: CPT

## 2020-12-01 PROCEDURE — 86140 C-REACTIVE PROTEIN: CPT

## 2020-12-01 PROCEDURE — 82550 ASSAY OF CK (CPK): CPT

## 2020-12-01 PROCEDURE — 85025 COMPLETE CBC W/AUTO DIFF WBC: CPT

## 2020-12-01 PROCEDURE — 36415 COLL VENOUS BLD VENIPUNCTURE: CPT

## 2020-12-01 PROCEDURE — 85652 RBC SED RATE AUTOMATED: CPT

## 2020-12-01 PROCEDURE — 80053 COMPREHEN METABOLIC PANEL: CPT

## 2021-03-11 DIAGNOSIS — J45.20 MILD INTERMITTENT ASTHMA WITHOUT COMPLICATION: ICD-10-CM

## 2021-03-16 RX ORDER — ALBUTEROL SULFATE 90 UG/1
2 AEROSOL, METERED RESPIRATORY (INHALATION) EVERY 4 HOURS PRN
Qty: 8 G | Refills: 2 | Status: SHIPPED | OUTPATIENT
Start: 2021-03-16 | End: 2021-06-22 | Stop reason: SDUPTHER

## 2021-03-26 ENCOUNTER — HOSPITAL ENCOUNTER (OUTPATIENT)
Dept: LAB | Facility: MEDICAL CENTER | Age: 71
End: 2021-03-26
Attending: NURSE PRACTITIONER
Payer: MEDICARE

## 2021-03-26 PROCEDURE — 80053 COMPREHEN METABOLIC PANEL: CPT

## 2021-03-26 PROCEDURE — 83615 LACTATE (LD) (LDH) ENZYME: CPT

## 2021-03-26 PROCEDURE — 85652 RBC SED RATE AUTOMATED: CPT

## 2021-03-26 PROCEDURE — 86140 C-REACTIVE PROTEIN: CPT

## 2021-03-26 PROCEDURE — 82550 ASSAY OF CK (CPK): CPT

## 2021-03-26 PROCEDURE — 36415 COLL VENOUS BLD VENIPUNCTURE: CPT

## 2021-03-26 PROCEDURE — 85025 COMPLETE CBC W/AUTO DIFF WBC: CPT

## 2021-03-27 LAB
ALBUMIN SERPL BCP-MCNC: 4.1 G/DL (ref 3.2–4.9)
ALBUMIN/GLOB SERPL: 1.2 G/DL
ALP SERPL-CCNC: 74 U/L (ref 30–99)
ALT SERPL-CCNC: 16 U/L (ref 2–50)
ANION GAP SERPL CALC-SCNC: 9 MMOL/L (ref 7–16)
AST SERPL-CCNC: 15 U/L (ref 12–45)
BASOPHILS # BLD AUTO: 0.9 % (ref 0–1.8)
BASOPHILS # BLD: 0.07 K/UL (ref 0–0.12)
BILIRUB SERPL-MCNC: <0.2 MG/DL (ref 0.1–1.5)
BUN SERPL-MCNC: 12 MG/DL (ref 8–22)
CALCIUM SERPL-MCNC: 9.2 MG/DL (ref 8.5–10.5)
CHLORIDE SERPL-SCNC: 99 MMOL/L (ref 96–112)
CK SERPL-CCNC: 70 U/L (ref 0–154)
CO2 SERPL-SCNC: 27 MMOL/L (ref 20–33)
CREAT SERPL-MCNC: 0.77 MG/DL (ref 0.5–1.4)
CRP SERPL HS-MCNC: 0.45 MG/DL (ref 0–0.75)
EOSINOPHIL # BLD AUTO: 0.44 K/UL (ref 0–0.51)
EOSINOPHIL NFR BLD: 5.9 % (ref 0–6.9)
ERYTHROCYTE [DISTWIDTH] IN BLOOD BY AUTOMATED COUNT: 45.7 FL (ref 35.9–50)
ERYTHROCYTE [SEDIMENTATION RATE] IN BLOOD BY WESTERGREN METHOD: 13 MM/HOUR (ref 0–30)
GLOBULIN SER CALC-MCNC: 3.3 G/DL (ref 1.9–3.5)
GLUCOSE SERPL-MCNC: 83 MG/DL (ref 65–99)
HCT VFR BLD AUTO: 43.1 % (ref 37–47)
HGB BLD-MCNC: 14.3 G/DL (ref 12–16)
IMM GRANULOCYTES # BLD AUTO: 0.03 K/UL (ref 0–0.11)
IMM GRANULOCYTES NFR BLD AUTO: 0.4 % (ref 0–0.9)
LDH SERPL L TO P-CCNC: 191 U/L (ref 107–266)
LYMPHOCYTES # BLD AUTO: 1.74 K/UL (ref 1–4.8)
LYMPHOCYTES NFR BLD: 23.5 % (ref 22–41)
MCH RBC QN AUTO: 30.5 PG (ref 27–33)
MCHC RBC AUTO-ENTMCNC: 33.2 G/DL (ref 33.6–35)
MCV RBC AUTO: 91.9 FL (ref 81.4–97.8)
MONOCYTES # BLD AUTO: 0.97 K/UL (ref 0–0.85)
MONOCYTES NFR BLD AUTO: 13.1 % (ref 0–13.4)
NEUTROPHILS # BLD AUTO: 4.15 K/UL (ref 2–7.15)
NEUTROPHILS NFR BLD: 56.2 % (ref 44–72)
NRBC # BLD AUTO: 0 K/UL
NRBC BLD-RTO: 0 /100 WBC
PLATELET # BLD AUTO: 279 K/UL (ref 164–446)
PMV BLD AUTO: 9.5 FL (ref 9–12.9)
POTASSIUM SERPL-SCNC: 4.6 MMOL/L (ref 3.6–5.5)
PROT SERPL-MCNC: 7.4 G/DL (ref 6–8.2)
RBC # BLD AUTO: 4.69 M/UL (ref 4.2–5.4)
SODIUM SERPL-SCNC: 135 MMOL/L (ref 135–145)
WBC # BLD AUTO: 7.4 K/UL (ref 4.8–10.8)

## 2021-04-20 DIAGNOSIS — M06.09 RHEUMATOID ARTHRITIS OF MULTIPLE SITES WITH NEGATIVE RHEUMATOID FACTOR (HCC): ICD-10-CM

## 2021-04-20 RX ORDER — METHYLPREDNISOLONE SODIUM SUCCINATE 125 MG/2ML
100 INJECTION, POWDER, LYOPHILIZED, FOR SOLUTION INTRAMUSCULAR; INTRAVENOUS ONCE
Status: CANCELLED | OUTPATIENT
Start: 2021-04-21 | End: 2021-04-21

## 2021-04-20 RX ORDER — ACETAMINOPHEN 325 MG/1
650 TABLET ORAL ONCE
Status: CANCELLED | OUTPATIENT
Start: 2021-04-21

## 2021-04-23 DIAGNOSIS — I10 ESSENTIAL HYPERTENSION: ICD-10-CM

## 2021-04-23 RX ORDER — BISOPROLOL FUMARATE 10 MG/1
TABLET, FILM COATED ORAL
Refills: 0 | OUTPATIENT
Start: 2021-04-23

## 2021-04-25 ENCOUNTER — OUTPATIENT INFUSION SERVICES (OUTPATIENT)
Dept: ONCOLOGY | Facility: MEDICAL CENTER | Age: 71
End: 2021-04-25
Attending: INTERNAL MEDICINE
Payer: MEDICARE

## 2021-04-25 VITALS
SYSTOLIC BLOOD PRESSURE: 143 MMHG | HEIGHT: 64 IN | HEART RATE: 95 BPM | WEIGHT: 177.91 LBS | TEMPERATURE: 98.3 F | OXYGEN SATURATION: 100 % | RESPIRATION RATE: 18 BRPM | DIASTOLIC BLOOD PRESSURE: 62 MMHG | BODY MASS INDEX: 30.37 KG/M2

## 2021-04-25 DIAGNOSIS — I10 ESSENTIAL HYPERTENSION: ICD-10-CM

## 2021-04-25 DIAGNOSIS — M06.09 RHEUMATOID ARTHRITIS OF MULTIPLE SITES WITH NEGATIVE RHEUMATOID FACTOR (HCC): ICD-10-CM

## 2021-04-25 PROCEDURE — 700102 HCHG RX REV CODE 250 W/ 637 OVERRIDE(OP): Performed by: INTERNAL MEDICINE

## 2021-04-25 PROCEDURE — 700105 HCHG RX REV CODE 258: Performed by: INTERNAL MEDICINE

## 2021-04-25 PROCEDURE — 700111 HCHG RX REV CODE 636 W/ 250 OVERRIDE (IP): Mod: JG | Performed by: INTERNAL MEDICINE

## 2021-04-25 PROCEDURE — 96375 TX/PRO/DX INJ NEW DRUG ADDON: CPT

## 2021-04-25 PROCEDURE — 96415 CHEMO IV INFUSION ADDL HR: CPT

## 2021-04-25 PROCEDURE — 96413 CHEMO IV INFUSION 1 HR: CPT

## 2021-04-25 PROCEDURE — A9270 NON-COVERED ITEM OR SERVICE: HCPCS | Performed by: INTERNAL MEDICINE

## 2021-04-25 RX ORDER — ACETAMINOPHEN 325 MG/1
650 TABLET ORAL ONCE
Status: COMPLETED | OUTPATIENT
Start: 2021-04-25 | End: 2021-04-25

## 2021-04-25 RX ORDER — METHYLPREDNISOLONE SODIUM SUCCINATE 125 MG/2ML
100 INJECTION, POWDER, LYOPHILIZED, FOR SOLUTION INTRAMUSCULAR; INTRAVENOUS ONCE
Status: CANCELLED | OUTPATIENT
Start: 2021-04-25 | End: 2021-04-25

## 2021-04-25 RX ORDER — RITUXIMAB 10 MG/ML
INJECTION, SOLUTION INTRAVENOUS
COMMUNITY
Start: 2021-04-16 | End: 2021-04-25

## 2021-04-25 RX ORDER — BISOPROLOL FUMARATE 10 MG/1
TABLET, FILM COATED ORAL
COMMUNITY
Start: 2020-11-10 | End: 2021-04-25

## 2021-04-25 RX ORDER — ASCORBIC ACID 125 MG
TABLET,CHEWABLE ORAL
COMMUNITY

## 2021-04-25 RX ORDER — CLOPIDOGREL BISULFATE 75 MG/1
75 TABLET ORAL DAILY
COMMUNITY
Start: 2020-11-04 | End: 2023-05-10

## 2021-04-25 RX ORDER — ACETAMINOPHEN 325 MG/1
650 TABLET ORAL ONCE
Status: CANCELLED | OUTPATIENT
Start: 2021-04-25

## 2021-04-25 RX ORDER — FLUTICASONE PROPIONATE 50 MCG
SPRAY, SUSPENSION (ML) NASAL
COMMUNITY

## 2021-04-25 RX ORDER — METHYLPREDNISOLONE SODIUM SUCCINATE 125 MG/2ML
100 INJECTION, POWDER, LYOPHILIZED, FOR SOLUTION INTRAMUSCULAR; INTRAVENOUS ONCE
Status: COMPLETED | OUTPATIENT
Start: 2021-04-25 | End: 2021-04-25

## 2021-04-25 RX ADMIN — DIPHENHYDRAMINE HYDROCHLORIDE 25 MG: 50 INJECTION, SOLUTION INTRAMUSCULAR; INTRAVENOUS at 10:17

## 2021-04-25 RX ADMIN — METHYLPREDNISOLONE SODIUM SUCCINATE 100 MG: 125 INJECTION, POWDER, FOR SOLUTION INTRAMUSCULAR; INTRAVENOUS at 10:10

## 2021-04-25 RX ADMIN — SODIUM CHLORIDE 1000 MG: 9 INJECTION, SOLUTION INTRAVENOUS at 10:43

## 2021-04-25 RX ADMIN — ACETAMINOPHEN 650 MG: 325 TABLET ORAL at 10:10

## 2021-04-25 ASSESSMENT — FIBROSIS 4 INDEX: FIB4 SCORE: 0.91

## 2021-04-25 NOTE — PROGRESS NOTES
Chemotherapy Verification - SECONDARY RN       Height = 163 cm  Weight = 80.7 kg  BSA = 1.91 m2       Medication: Rituximab  Dose: 1000 mg set dose  Calculated Dose: 1000 mg                              (In mg/m2, AUC, mg/kg)     I confirm that this process was performed independently.

## 2021-04-25 NOTE — PROGRESS NOTES
Chemotherapy Verification - PRIMARY RN    Maintenance    Height = 163 cm  Weight = 80.7 kg  BSA = 1.91 m2 - Hep B NEG 12/3/2019      Medication: Rituximab-pvvr (Ruxience)  Dose: fixed dose  Calculated Dose: 1000 mg fixed dose                                   I confirm this process was performed independently with the BSA and all final chemotherapy dosing calculations congruent.  Any discrepancies of 10% or greater have been addressed with the chemotherapy pharmacist. The resolution of the discrepancy has been documented in the EPIC progress notes.

## 2021-04-25 NOTE — PROGRESS NOTES
Pt arrived to [facility] ambulatory, here for maintenance Ruxience for RA. IV access established. Premeds given. Ruxience infused per MAR, pt sushant well. Line flushed clear. IV flushed and removed. Pressure dressing applied. Pt declining to make next appt as she will be seeing her MD soon and they will discuss next steps. Pt discharged home under self care in no apparent distress.

## 2021-04-28 RX ORDER — BISOPROLOL FUMARATE 10 MG/1
TABLET, FILM COATED ORAL
Qty: 45 TABLET | Refills: 0 | Status: SHIPPED | OUTPATIENT
Start: 2021-04-28 | End: 2021-06-17 | Stop reason: SDUPTHER

## 2021-06-17 ENCOUNTER — OFFICE VISIT (OUTPATIENT)
Dept: URGENT CARE | Facility: PHYSICIAN GROUP | Age: 71
End: 2021-06-17
Payer: MEDICARE

## 2021-06-17 VITALS
RESPIRATION RATE: 16 BRPM | TEMPERATURE: 98.1 F | OXYGEN SATURATION: 98 % | HEART RATE: 106 BPM | HEIGHT: 64 IN | SYSTOLIC BLOOD PRESSURE: 164 MMHG | DIASTOLIC BLOOD PRESSURE: 116 MMHG | BODY MASS INDEX: 30.22 KG/M2 | WEIGHT: 177 LBS

## 2021-06-17 DIAGNOSIS — I10 ESSENTIAL HYPERTENSION: ICD-10-CM

## 2021-06-17 PROBLEM — M06.9 RHEUMATOID ARTHRITIS (HCC): Status: ACTIVE | Noted: 2020-12-07

## 2021-06-17 PROCEDURE — 99214 OFFICE O/P EST MOD 30 MIN: CPT | Performed by: NURSE PRACTITIONER

## 2021-06-17 RX ORDER — BISOPROLOL FUMARATE 10 MG/1
TABLET, FILM COATED ORAL
Qty: 45 TABLET | Refills: 0 | Status: SHIPPED | OUTPATIENT
Start: 2021-06-17 | End: 2021-06-22 | Stop reason: SDUPTHER

## 2021-06-17 ASSESSMENT — ENCOUNTER SYMPTOMS
CHILLS: 0
VOMITING: 0
PALPITATIONS: 0
HEADACHES: 0
HYPERTENSION: 1
FEVER: 0
SHORTNESS OF BREATH: 0
TINGLING: 1
NAUSEA: 0

## 2021-06-17 ASSESSMENT — FIBROSIS 4 INDEX: FIB4 SCORE: 0.91

## 2021-06-18 NOTE — PROGRESS NOTES
"Subjective:     Vane Ritter is a 70 y.o. female who presents for Hypertension and Medication Refill (Zebeta )      Hypertension  Pertinent negatives include no chest pain, headaches, malaise/fatigue, palpitations or shortness of breath.     Pt presents for evaluation of a new problem.  Vane is a 70-year-old female who presents to urgent care today as she is run out of her antihypertensive medication.  She notes that she ran out of this medication 3 days ago.  She has been trying to obtain an appointment with her primary care provider Mar Randle, however, she has been unable to obtain an appointment.  Her blood pressure has been extremely high.  She has suffered from essential hypertension and has been treated with Bisoprolol.  This has worked well for her in the past.  Her last inpatient appointment with Mar Randle was in 2019.    tReview of Systems   Constitutional: Negative for chills, fever and malaise/fatigue.   Respiratory: Negative for shortness of breath.    Cardiovascular: Negative for chest pain and palpitations.   Gastrointestinal: Negative for nausea and vomiting.   Neurological: Positive for tingling. Negative for headaches.       PMH:   Past Medical History:   Diagnosis Date   • Allergies    • Arthritis     RA   • Bronchitis     history of   • Heart attack (HCC)     2009 stent x 1   • High cholesterol     not medicated   • Hypertension     well controlled on medications per pt   • Rhabdomyolysis    • Sleep apnea     \"extremely mild case\" per pt; does not use cpap   • Snoring     sleep study done     ALLERGIES:   Allergies   Allergen Reactions   • Lisinopril Cough     cough   • Statins [Hmg-Coa-R Inhibitors]      Caused rhabdomyolysis     SURGHX:   Past Surgical History:   Procedure Laterality Date   • PB THROMBOENDARTECTMY NECK,NECK INCIS Left 5/29/2020    Procedure: ENDARTERECTOMY, CAROTID;  Surgeon: Mukund Adorno M.D.;  Location: SURGERY [facility];  Service: Vascular   • " CYST EXCISION  2014    right ankle, RA nodule    • TUBAL COAGULATION LAPAROSCOPIC BILATERAL Bilateral 1980   • STENT PLACEMENT      2009     SOCHX:   Social History     Socioeconomic History   • Marital status:      Spouse name: Not on file   • Number of children: Not on file   • Years of education: Not on file   • Highest education level: Not on file   Occupational History   • Not on file   Tobacco Use   • Smoking status: Current Some Day Smoker     Packs/day: 0.25     Years: 40.00     Pack years: 10.00     Types: Cigarettes   • Smokeless tobacco: Never Used   Vaping Use   • Vaping Use: Never assessed   Substance and Sexual Activity   • Alcohol use: Yes     Alcohol/week: 2.4 oz     Types: 4 Cans of beer per week     Comment: rarely   • Drug use: No   • Sexual activity: Not Currently     Partners: Male   Other Topics Concern   • Not on file   Social History Narrative   • Not on file     Social Determinants of Health     Financial Resource Strain:    • Difficulty of Paying Living Expenses:    Food Insecurity:    • Worried About Running Out of Food in the Last Year:    • Ran Out of Food in the Last Year:    Transportation Needs:    • Lack of Transportation (Medical):    • Lack of Transportation (Non-Medical):    Physical Activity:    • Days of Exercise per Week:    • Minutes of Exercise per Session:    Stress:    • Feeling of Stress :    Social Connections:    • Frequency of Communication with Friends and Family:    • Frequency of Social Gatherings with Friends and Family:    • Attends [employer] Services:    • Active Member of Clubs or Organizations:    • Attends Club or Organization Meetings:    • Marital Status:    Intimate Partner Violence:    • Fear of Current or Ex-Partner:    • Emotionally Abused:    • Physically Abused:    • Sexually Abused:      FH:   Family History   Problem Relation Age of Onset   • Stroke Mother    • Stroke Father          Objective:   BP (!) 164/116   Pulse (!) 106   Temp 36.7 °C  "(98.1 °F) (Temporal)   Resp 16   Ht 1.626 m (5' 4\")   Wt 80.3 kg (177 lb)   LMP  (LMP Unknown)   SpO2 98%   BMI 30.38 kg/m²     Physical Exam  Vitals and nursing note reviewed.   Constitutional:       Appearance: Normal appearance.   HENT:      Head: Normocephalic and atraumatic.      Right Ear: External ear normal.      Left Ear: External ear normal.      Nose: No congestion or rhinorrhea.      Mouth/Throat:      Mouth: Mucous membranes are moist.   Eyes:      Extraocular Movements: Extraocular movements intact.      Pupils: Pupils are equal, round, and reactive to light.   Cardiovascular:      Rate and Rhythm: Normal rate and regular rhythm.      Heart sounds: Normal heart sounds. No murmur heard.     Pulmonary:      Effort: Pulmonary effort is normal.      Breath sounds: Normal breath sounds.   Abdominal:      General: Abdomen is flat. There is no distension.      Tenderness: There is no abdominal tenderness.   Musculoskeletal:         General: No swelling or tenderness.      Cervical back: Normal range of motion and neck supple.   Skin:     General: Skin is warm and dry.      Findings: No rash.   Neurological:      General: No focal deficit present.      Mental Status: She is alert and oriented to person, place, and time. Mental status is at baseline.   Psychiatric:         Mood and Affect: Mood normal.         Behavior: Behavior normal.         Thought Content: Thought content normal.         Judgment: Judgment normal.         Assessment/Plan:   Assessment      1. Essential hypertension  bisoprolol (ZEBETA) 10 MG tablet   Her blood pressure medication was prescribed and sent to pharmacy.  Her blood pressure was extremely high in clinic today.  Patient educated to closely monitor blood pressure over the next few days.  She verbalizes agreement and understanding.  Patient to seek treatment in ER for worsening symptoms.Time spent evaluating this patient was at least 30 minutes and includes preparing for " visit, counseling/education, exam and evaluation, obtaining history, independent interpretation and ordering labs/tests/procedures.

## 2021-06-22 ENCOUNTER — OFFICE VISIT (OUTPATIENT)
Dept: MEDICAL GROUP | Facility: PHYSICIAN GROUP | Age: 71
End: 2021-06-22
Payer: MEDICARE

## 2021-06-22 ENCOUNTER — HOSPITAL ENCOUNTER (OUTPATIENT)
Dept: LAB | Facility: MEDICAL CENTER | Age: 71
End: 2021-06-22
Attending: NURSE PRACTITIONER
Payer: MEDICARE

## 2021-06-22 ENCOUNTER — HOSPITAL ENCOUNTER (OUTPATIENT)
Dept: LAB | Facility: MEDICAL CENTER | Age: 71
End: 2021-06-22
Attending: INTERNAL MEDICINE
Payer: MEDICARE

## 2021-06-22 VITALS
BODY MASS INDEX: 30.22 KG/M2 | RESPIRATION RATE: 16 BRPM | SYSTOLIC BLOOD PRESSURE: 128 MMHG | HEART RATE: 65 BPM | HEIGHT: 64 IN | OXYGEN SATURATION: 97 % | TEMPERATURE: 96.1 F | DIASTOLIC BLOOD PRESSURE: 80 MMHG | WEIGHT: 177 LBS

## 2021-06-22 DIAGNOSIS — I10 ESSENTIAL HYPERTENSION: ICD-10-CM

## 2021-06-22 DIAGNOSIS — E78.2 MIXED HYPERLIPIDEMIA: ICD-10-CM

## 2021-06-22 DIAGNOSIS — J45.20 MILD INTERMITTENT ASTHMA WITHOUT COMPLICATION: ICD-10-CM

## 2021-06-22 LAB
ALBUMIN SERPL BCP-MCNC: 4.2 G/DL (ref 3.2–4.9)
ALBUMIN/GLOB SERPL: 1.2 G/DL
ALP SERPL-CCNC: 74 U/L (ref 30–99)
ALT SERPL-CCNC: 19 U/L (ref 2–50)
ANION GAP SERPL CALC-SCNC: 10 MMOL/L (ref 7–16)
AST SERPL-CCNC: 18 U/L (ref 12–45)
BILIRUB SERPL-MCNC: 0.2 MG/DL (ref 0.1–1.5)
BUN SERPL-MCNC: 17 MG/DL (ref 8–22)
CALCIUM SERPL-MCNC: 9.2 MG/DL (ref 8.5–10.5)
CHLORIDE SERPL-SCNC: 103 MMOL/L (ref 96–112)
CHOLEST SERPL-MCNC: 224 MG/DL (ref 100–199)
CK SERPL-CCNC: 74 U/L (ref 0–154)
CO2 SERPL-SCNC: 25 MMOL/L (ref 20–33)
CREAT SERPL-MCNC: 0.8 MG/DL (ref 0.5–1.4)
CRP SERPL HS-MCNC: 0.48 MG/DL (ref 0–0.75)
FASTING STATUS PATIENT QL REPORTED: NORMAL
GLOBULIN SER CALC-MCNC: 3.6 G/DL (ref 1.9–3.5)
GLUCOSE SERPL-MCNC: 86 MG/DL (ref 65–99)
HDLC SERPL-MCNC: 34 MG/DL
LDH SERPL L TO P-CCNC: 200 U/L (ref 107–266)
LDLC SERPL CALC-MCNC: 136 MG/DL
POTASSIUM SERPL-SCNC: 4.2 MMOL/L (ref 3.6–5.5)
PROT SERPL-MCNC: 7.8 G/DL (ref 6–8.2)
SODIUM SERPL-SCNC: 138 MMOL/L (ref 135–145)
TRIGL SERPL-MCNC: 268 MG/DL (ref 0–149)

## 2021-06-22 PROCEDURE — 82550 ASSAY OF CK (CPK): CPT

## 2021-06-22 PROCEDURE — 80061 LIPID PANEL: CPT

## 2021-06-22 PROCEDURE — 85652 RBC SED RATE AUTOMATED: CPT

## 2021-06-22 PROCEDURE — 86140 C-REACTIVE PROTEIN: CPT

## 2021-06-22 PROCEDURE — 99213 OFFICE O/P EST LOW 20 MIN: CPT | Performed by: NURSE PRACTITIONER

## 2021-06-22 PROCEDURE — 85025 COMPLETE CBC W/AUTO DIFF WBC: CPT

## 2021-06-22 PROCEDURE — 80053 COMPREHEN METABOLIC PANEL: CPT

## 2021-06-22 PROCEDURE — 83615 LACTATE (LD) (LDH) ENZYME: CPT

## 2021-06-22 PROCEDURE — 36415 COLL VENOUS BLD VENIPUNCTURE: CPT

## 2021-06-22 RX ORDER — ALBUTEROL SULFATE 90 UG/1
2 AEROSOL, METERED RESPIRATORY (INHALATION) EVERY 4 HOURS PRN
Qty: 8 G | Refills: 2 | Status: SHIPPED | OUTPATIENT
Start: 2021-06-22

## 2021-06-22 RX ORDER — BISOPROLOL FUMARATE 10 MG/1
TABLET, FILM COATED ORAL
Qty: 180 TABLET | Refills: 3 | Status: SHIPPED | OUTPATIENT
Start: 2021-06-22

## 2021-06-22 ASSESSMENT — PATIENT HEALTH QUESTIONNAIRE - PHQ9: CLINICAL INTERPRETATION OF PHQ2 SCORE: 0

## 2021-06-22 ASSESSMENT — FIBROSIS 4 INDEX: FIB4 SCORE: 0.91

## 2021-06-22 NOTE — PROGRESS NOTES
Chief Complaint   Patient presents with   • Medication Refill   • Hypertension       HISTORY OF PRESENT ILLNESS: Patient is a 70 y.o. female established patient who presents today to follow up on labs, med refills    Mixed hyperlipidemia  The 10-year ASCVD risk score (Cowdreyhoward RAY Jr., et al., 2013) is: 20.1%  Uncontrolled, cannot take statins due to intolerance  She suffered severe rhabdomyolysis when she was tried on a statin a few years ago  She is closely followed by vascular medicine as well as cardiology  Due for labs, these been ordered    Mild intermittent asthma without complication  Chronic and stable  Does not report any recent exacerbations  Does need refills on inhaler    Essential hypertension  Chronic and stable  Adequately controlled on current medication including bisoprolol  She does need refills, this has been called in  Up-to-date with labs  Blood pressure today well within normal limits denies symptoms of hypertension      Patient Active Problem List    Diagnosis Date Noted   • Rheumatoid arthritis (HCC) 12/07/2020   • Medicare annual wellness visit, initial 11/26/2019   • AZAM (obstructive sleep apnea) 03/28/2019   • Rash 03/20/2019   • Generalized weakness 02/28/2019   • Left carotid bruit 02/28/2019   • Frequent nocturnal awakening 02/22/2019   • Non-traumatic rhabdomyolysis 02/07/2019   • Normocytic anemia 02/07/2019   • Myositis of multiple sites 02/06/2019   • Leukocytosis 02/06/2019   • Tobacco dependence 02/06/2019   • Elevated platelet count 02/05/2019   • Hospital discharge follow-up 02/05/2019   • Coronary artery disease involving native coronary artery of native heart without angina pectoris 01/29/2019   • Transaminitis 01/16/2019   • Polyarthritis 12/13/2018   • Ganglion cyst of wrist, right 12/13/2018   • Seborrheic keratoses 06/14/2018   • Mild intermittent asthma without complication 04/10/2018   • Anxiety 04/10/2018   • Essential hypertension 03/24/2017   • Mixed hyperlipidemia  2017   • Overweight with body mass index (BMI) 25.0-29.9 2017       Allergies:Lisinopril and Statins [hmg-coa-r inhibitors]    Current Outpatient Medications   Medication Sig Dispense Refill   • bisoprolol (ZEBETA) 10 MG tablet Take 2 pills daily 180 tablet 3   • VENTOLIN  (90 Base) MCG/ACT Aero Soln inhalation aerosol Inhale 2 Puffs every four hours as needed. FOR SHORTNESS OF BREATH 8 g 2   • clopidogrel (PLAVIX) 75 MG Tab Take 75 mg by mouth every day.     • fluticasone (FLONASE) 50 MCG/ACT nasal spray 1 spray(s) intranasally once a day for 30 day(s)     • Ascorbic Acid (VITAMIN C ADULT GUMMIES) 125 MG Chew Tab Chew.     • Multiple Vitamins-Minerals (HAIR SKIN & NAILS ADVANCED PO) Take  by mouth.     • hydroxychloroquine (PLAQUENIL) 200 MG Tab Take 200 mg by mouth 2 times a day.     • riTUXimab (RITUXAN IV) by Intravenous route. infusion     • aspirin (ASA) 325 MG Tab Take 325 mg by mouth every evening.     • methotrexate 2.5 MG tablet Take 17.5 mg by mouth every .     • Multiple Vitamins-Minerals (MULTIVITAMIN ADULT PO) Take 1 Tab by mouth. Approximately 4 times per week       No current facility-administered medications for this visit.       Social History     Tobacco Use   • Smoking status: Current Some Day Smoker     Packs/day: 0.25     Years: 40.00     Pack years: 10.00     Types: Cigarettes   • Smokeless tobacco: Never Used   Vaping Use   • Vaping Use: Never assessed   Substance Use Topics   • Alcohol use: Yes     Alcohol/week: 2.4 oz     Types: 4 Cans of beer per week     Comment: rarely   • Drug use: No       Family Status   Relation Name Status   • Mo     • Fa     • Bro  Alive     Family History   Problem Relation Age of Onset   • Stroke Mother    • Stroke Father        Review of Systems:   Constitutional: Negative for fever, chills, weight loss and malaise/fatigue.   Respiratory: Negative for cough, sputum production, shortness of breath and wheezing.   "  Cardiovascular: Negative for chest pain, palpitations, orthopnea and leg swelling.   Gastrointestinal: Negative for heartburn, nausea, vomiting and abdominal pain.   Genitourinary/Renal: Negative for dysuria, urgency and frequency.   Musculoskeletal: Negative for myalgias, back pain and joint pain.   Skin: Negative for rash and itching.   Neurological: Negative for dizziness, tingling, tremors, sensory change, focal weakness and headaches.   Endo/Heme/Allergies: Does not bruise/bleed easily.       Exam:  /80   Pulse 65   Temp (!) 35.6 °C (96.1 °F) (Temporal)   Resp 16   Ht 1.626 m (5' 4\")   Wt 80.3 kg (177 lb)   SpO2 97%   General:  Well nourished, well developed female in NAD  Skin: warm, dry, intact, no evidence of rash or concerning lesions  Head: is grossly normal.  HEENT: eyes clear, conjunctiva normal, PERRLA  Pulmonary: Clear to ausculation. Normal effort. No rales, ronchi, or wheezing.  Cardiovascular: Regular rate and rhythm without murmur. Carotid and radial pulses are intact and equal bilaterally.  Abdomen: soft, non-tender, positive bowel sounds  Musculoskeletal: no clubbing, cyanosis, or edema.  Psych/mental: no depression, anxiety, hallucinations  Neuro: alert, intact, CN 2-12 grossly intact    Medical decision-making and discussion:    As mentioned above, patient will have labs done, she is due for lipid profile but rest of her labs are up-to-date.  She is advised to continue care per her specialist.  Medications have been refilled, advised to take them as prescribed.  Otherwise patient to follow-up in 6 months, sooner if needed        Assessment/Plan:  Vane was seen today for medication refill and hypertension.    Diagnoses and all orders for this visit:    Essential hypertension  -     bisoprolol (ZEBETA) 10 MG tablet; Take 2 pills daily    Mild intermittent asthma without complication  -     VENTOLIN  (90 Base) MCG/ACT Aero Soln inhalation aerosol; Inhale 2 Puffs every four " hours as needed. FOR SHORTNESS OF BREATH    Mixed hyperlipidemia  -     Lipid Profile; Future         Return in about 6 months (around 12/22/2021) for Follow-up.    Please note that this dictation was created using voice recognition software. I have made every reasonable attempt to correct obvious errors, but I expect that there are errors of grammar and possibly content that I did not discover before finalizing the note.

## 2021-06-23 LAB
BASOPHILS # BLD AUTO: 0.8 % (ref 0–1.8)
BASOPHILS # BLD: 0.06 K/UL (ref 0–0.12)
EOSINOPHIL # BLD AUTO: 0.25 K/UL (ref 0–0.51)
EOSINOPHIL NFR BLD: 3.5 % (ref 0–6.9)
ERYTHROCYTE [DISTWIDTH] IN BLOOD BY AUTOMATED COUNT: 45.3 FL (ref 35.9–50)
ERYTHROCYTE [SEDIMENTATION RATE] IN BLOOD BY WESTERGREN METHOD: 9 MM/HOUR (ref 0–25)
HCT VFR BLD AUTO: 42.6 % (ref 37–47)
HGB BLD-MCNC: 14.4 G/DL (ref 12–16)
IMM GRANULOCYTES # BLD AUTO: 0.03 K/UL (ref 0–0.11)
IMM GRANULOCYTES NFR BLD AUTO: 0.4 % (ref 0–0.9)
LYMPHOCYTES # BLD AUTO: 1.92 K/UL (ref 1–4.8)
LYMPHOCYTES NFR BLD: 26.5 % (ref 22–41)
MCH RBC QN AUTO: 31.3 PG (ref 27–33)
MCHC RBC AUTO-ENTMCNC: 33.8 G/DL (ref 33.6–35)
MCV RBC AUTO: 92.6 FL (ref 81.4–97.8)
MONOCYTES # BLD AUTO: 1 K/UL (ref 0–0.85)
MONOCYTES NFR BLD AUTO: 13.8 % (ref 0–13.4)
NEUTROPHILS # BLD AUTO: 3.98 K/UL (ref 2–7.15)
NEUTROPHILS NFR BLD: 55 % (ref 44–72)
NRBC # BLD AUTO: 0 K/UL
NRBC BLD-RTO: 0 /100 WBC
PLATELET # BLD AUTO: 342 K/UL (ref 164–446)
PMV BLD AUTO: 9.3 FL (ref 9–12.9)
RBC # BLD AUTO: 4.6 M/UL (ref 4.2–5.4)
WBC # BLD AUTO: 7.2 K/UL (ref 4.8–10.8)

## 2021-06-23 NOTE — ASSESSMENT & PLAN NOTE
The 10-year ASCVD risk score (Wyckoffhoward RAY Jr., et al., 2013) is: 20.1%  Uncontrolled, cannot take statins due to intolerance  She suffered severe rhabdomyolysis when she was tried on a statin a few years ago  She is closely followed by vascular medicine as well as cardiology  Due for labs, these been ordered

## 2021-06-23 NOTE — ASSESSMENT & PLAN NOTE
Chronic and stable  Adequately controlled on current medication including bisoprolol  She does need refills, this has been called in  Up-to-date with labs  Blood pressure today well within normal limits denies symptoms of hypertension

## 2021-11-01 ENCOUNTER — HOSPITAL ENCOUNTER (OUTPATIENT)
Dept: LAB | Facility: MEDICAL CENTER | Age: 71
End: 2021-11-01
Attending: NURSE PRACTITIONER
Payer: MEDICARE

## 2021-11-01 LAB
ALBUMIN SERPL BCP-MCNC: 4.4 G/DL (ref 3.2–4.9)
ALBUMIN/GLOB SERPL: 1.4 G/DL
ALP SERPL-CCNC: 83 U/L (ref 30–99)
ALT SERPL-CCNC: 17 U/L (ref 2–50)
ANION GAP SERPL CALC-SCNC: 11 MMOL/L (ref 7–16)
AST SERPL-CCNC: 22 U/L (ref 12–45)
BASOPHILS # BLD AUTO: 0.7 % (ref 0–1.8)
BASOPHILS # BLD: 0.05 K/UL (ref 0–0.12)
BILIRUB SERPL-MCNC: 0.2 MG/DL (ref 0.1–1.5)
BUN SERPL-MCNC: 17 MG/DL (ref 8–22)
CALCIUM SERPL-MCNC: 9.7 MG/DL (ref 8.5–10.5)
CHLORIDE SERPL-SCNC: 102 MMOL/L (ref 96–112)
CK SERPL-CCNC: 64 U/L (ref 0–154)
CO2 SERPL-SCNC: 26 MMOL/L (ref 20–33)
CREAT SERPL-MCNC: 0.78 MG/DL (ref 0.5–1.4)
CRP SERPL HS-MCNC: 0.53 MG/DL (ref 0–0.75)
EOSINOPHIL # BLD AUTO: 0.39 K/UL (ref 0–0.51)
EOSINOPHIL NFR BLD: 5.6 % (ref 0–6.9)
ERYTHROCYTE [DISTWIDTH] IN BLOOD BY AUTOMATED COUNT: 48.2 FL (ref 35.9–50)
ERYTHROCYTE [SEDIMENTATION RATE] IN BLOOD BY WESTERGREN METHOD: 21 MM/HOUR (ref 0–25)
GLOBULIN SER CALC-MCNC: 3.1 G/DL (ref 1.9–3.5)
GLUCOSE SERPL-MCNC: 94 MG/DL (ref 65–99)
HCT VFR BLD AUTO: 44 % (ref 37–47)
HGB BLD-MCNC: 14.6 G/DL (ref 12–16)
IMM GRANULOCYTES # BLD AUTO: 0.02 K/UL (ref 0–0.11)
IMM GRANULOCYTES NFR BLD AUTO: 0.3 % (ref 0–0.9)
LDH SERPL L TO P-CCNC: 192 U/L (ref 107–266)
LYMPHOCYTES # BLD AUTO: 1.75 K/UL (ref 1–4.8)
LYMPHOCYTES NFR BLD: 25.3 % (ref 22–41)
MCH RBC QN AUTO: 30.8 PG (ref 27–33)
MCHC RBC AUTO-ENTMCNC: 33.2 G/DL (ref 33.6–35)
MCV RBC AUTO: 92.8 FL (ref 81.4–97.8)
MONOCYTES # BLD AUTO: 0.96 K/UL (ref 0–0.85)
MONOCYTES NFR BLD AUTO: 13.9 % (ref 0–13.4)
NEUTROPHILS # BLD AUTO: 3.76 K/UL (ref 2–7.15)
NEUTROPHILS NFR BLD: 54.2 % (ref 44–72)
NRBC # BLD AUTO: 0 K/UL
NRBC BLD-RTO: 0 /100 WBC
PLATELET # BLD AUTO: 252 K/UL (ref 164–446)
PMV BLD AUTO: 10.2 FL (ref 9–12.9)
POTASSIUM SERPL-SCNC: 4.6 MMOL/L (ref 3.6–5.5)
PROT SERPL-MCNC: 7.5 G/DL (ref 6–8.2)
RBC # BLD AUTO: 4.74 M/UL (ref 4.2–5.4)
SODIUM SERPL-SCNC: 139 MMOL/L (ref 135–145)
WBC # BLD AUTO: 6.9 K/UL (ref 4.8–10.8)

## 2021-11-01 PROCEDURE — 85652 RBC SED RATE AUTOMATED: CPT

## 2021-11-01 PROCEDURE — 83615 LACTATE (LD) (LDH) ENZYME: CPT

## 2021-11-01 PROCEDURE — 80053 COMPREHEN METABOLIC PANEL: CPT

## 2021-11-01 PROCEDURE — 86140 C-REACTIVE PROTEIN: CPT

## 2021-11-01 PROCEDURE — 85025 COMPLETE CBC W/AUTO DIFF WBC: CPT

## 2021-11-01 PROCEDURE — 36415 COLL VENOUS BLD VENIPUNCTURE: CPT

## 2021-11-01 PROCEDURE — 82550 ASSAY OF CK (CPK): CPT

## 2021-11-03 ENCOUNTER — HOSPITAL ENCOUNTER (OUTPATIENT)
Dept: RADIOLOGY | Facility: MEDICAL CENTER | Age: 71
End: 2021-11-03
Attending: NURSE PRACTITIONER
Payer: MEDICARE

## 2021-11-03 ENCOUNTER — HOSPITAL ENCOUNTER (OUTPATIENT)
Dept: RADIOLOGY | Facility: MEDICAL CENTER | Age: 71
End: 2021-11-03
Attending: INTERNAL MEDICINE
Payer: MEDICARE

## 2021-11-03 DIAGNOSIS — M48.061 SPINAL STENOSIS OF LUMBAR REGION, UNSPECIFIED WHETHER NEUROGENIC CLAUDICATION PRESENT: ICD-10-CM

## 2021-11-03 DIAGNOSIS — M81.0 SENILE OSTEOPOROSIS: ICD-10-CM

## 2021-11-03 DIAGNOSIS — Z12.31 VISIT FOR SCREENING MAMMOGRAM: ICD-10-CM

## 2021-11-03 PROCEDURE — 77080 DXA BONE DENSITY AXIAL: CPT

## 2021-11-03 PROCEDURE — 72148 MRI LUMBAR SPINE W/O DYE: CPT | Mod: MH

## 2021-11-03 PROCEDURE — 77063 BREAST TOMOSYNTHESIS BI: CPT

## 2022-01-18 ENCOUNTER — HOSPITAL ENCOUNTER (OUTPATIENT)
Dept: LAB | Facility: MEDICAL CENTER | Age: 72
End: 2022-01-18
Attending: INTERNAL MEDICINE
Payer: MEDICARE

## 2022-01-18 LAB
ALBUMIN SERPL BCP-MCNC: 4.3 G/DL (ref 3.2–4.9)
ALBUMIN/GLOB SERPL: 1.3 G/DL
ALP SERPL-CCNC: 76 U/L (ref 30–99)
ALT SERPL-CCNC: 17 U/L (ref 2–50)
ANION GAP SERPL CALC-SCNC: 14 MMOL/L (ref 7–16)
AST SERPL-CCNC: 17 U/L (ref 12–45)
BASOPHILS # BLD AUTO: 1 % (ref 0–1.8)
BASOPHILS # BLD: 0.06 K/UL (ref 0–0.12)
BILIRUB SERPL-MCNC: 0.4 MG/DL (ref 0.1–1.5)
BUN SERPL-MCNC: 13 MG/DL (ref 8–22)
CALCIUM SERPL-MCNC: 9.3 MG/DL (ref 8.5–10.5)
CHLORIDE SERPL-SCNC: 99 MMOL/L (ref 96–112)
CK SERPL-CCNC: 60 U/L (ref 0–154)
CO2 SERPL-SCNC: 22 MMOL/L (ref 20–33)
CREAT SERPL-MCNC: 0.76 MG/DL (ref 0.5–1.4)
CRP SERPL HS-MCNC: 1.31 MG/DL (ref 0–0.75)
EOSINOPHIL # BLD AUTO: 0.16 K/UL (ref 0–0.51)
EOSINOPHIL NFR BLD: 2.6 % (ref 0–6.9)
ERYTHROCYTE [DISTWIDTH] IN BLOOD BY AUTOMATED COUNT: 45 FL (ref 35.9–50)
ERYTHROCYTE [SEDIMENTATION RATE] IN BLOOD BY WESTERGREN METHOD: 14 MM/HOUR (ref 0–25)
GLOBULIN SER CALC-MCNC: 3.2 G/DL (ref 1.9–3.5)
GLUCOSE SERPL-MCNC: 103 MG/DL (ref 65–99)
HCT VFR BLD AUTO: 42 % (ref 37–47)
HGB BLD-MCNC: 14.2 G/DL (ref 12–16)
IMM GRANULOCYTES # BLD AUTO: 0.02 K/UL (ref 0–0.11)
IMM GRANULOCYTES NFR BLD AUTO: 0.3 % (ref 0–0.9)
LDH SERPL L TO P-CCNC: 199 U/L (ref 107–266)
LYMPHOCYTES # BLD AUTO: 1.55 K/UL (ref 1–4.8)
LYMPHOCYTES NFR BLD: 25.2 % (ref 22–41)
MCH RBC QN AUTO: 31.1 PG (ref 27–33)
MCHC RBC AUTO-ENTMCNC: 33.8 G/DL (ref 33.6–35)
MCV RBC AUTO: 92.1 FL (ref 81.4–97.8)
MONOCYTES # BLD AUTO: 0.5 K/UL (ref 0–0.85)
MONOCYTES NFR BLD AUTO: 8.1 % (ref 0–13.4)
NEUTROPHILS # BLD AUTO: 3.85 K/UL (ref 2–7.15)
NEUTROPHILS NFR BLD: 62.8 % (ref 44–72)
NRBC # BLD AUTO: 0 K/UL
NRBC BLD-RTO: 0 /100 WBC
PLATELET # BLD AUTO: 224 K/UL (ref 164–446)
PMV BLD AUTO: 10.8 FL (ref 9–12.9)
POTASSIUM SERPL-SCNC: 4.3 MMOL/L (ref 3.6–5.5)
PROT SERPL-MCNC: 7.5 G/DL (ref 6–8.2)
RBC # BLD AUTO: 4.56 M/UL (ref 4.2–5.4)
SODIUM SERPL-SCNC: 135 MMOL/L (ref 135–145)
WBC # BLD AUTO: 6.1 K/UL (ref 4.8–10.8)

## 2022-01-18 PROCEDURE — 86140 C-REACTIVE PROTEIN: CPT

## 2022-01-18 PROCEDURE — 85025 COMPLETE CBC W/AUTO DIFF WBC: CPT

## 2022-01-18 PROCEDURE — 82550 ASSAY OF CK (CPK): CPT

## 2022-01-18 PROCEDURE — 36415 COLL VENOUS BLD VENIPUNCTURE: CPT

## 2022-01-18 PROCEDURE — 80053 COMPREHEN METABOLIC PANEL: CPT

## 2022-01-18 PROCEDURE — 83615 LACTATE (LD) (LDH) ENZYME: CPT

## 2022-01-18 PROCEDURE — 85652 RBC SED RATE AUTOMATED: CPT

## 2022-05-03 ENCOUNTER — HOSPITAL ENCOUNTER (OUTPATIENT)
Dept: LAB | Facility: MEDICAL CENTER | Age: 72
End: 2022-05-03
Attending: INTERNAL MEDICINE
Payer: MEDICARE

## 2022-05-03 LAB
ALBUMIN SERPL BCP-MCNC: 4.3 G/DL (ref 3.2–4.9)
ALBUMIN/GLOB SERPL: 1.2 G/DL
ALP SERPL-CCNC: 86 U/L (ref 30–99)
ALT SERPL-CCNC: 16 U/L (ref 2–50)
ANION GAP SERPL CALC-SCNC: 11 MMOL/L (ref 7–16)
AST SERPL-CCNC: 13 U/L (ref 12–45)
BASOPHILS # BLD AUTO: 0.8 % (ref 0–1.8)
BASOPHILS # BLD: 0.07 K/UL (ref 0–0.12)
BILIRUB SERPL-MCNC: 0.4 MG/DL (ref 0.1–1.5)
BUN SERPL-MCNC: 14 MG/DL (ref 8–22)
CALCIUM SERPL-MCNC: 9.3 MG/DL (ref 8.5–10.5)
CHLORIDE SERPL-SCNC: 100 MMOL/L (ref 96–112)
CK SERPL-CCNC: 76 U/L (ref 0–154)
CO2 SERPL-SCNC: 23 MMOL/L (ref 20–33)
CREAT SERPL-MCNC: 0.86 MG/DL (ref 0.5–1.4)
CRP SERPL HS-MCNC: 1.16 MG/DL (ref 0–0.75)
EOSINOPHIL # BLD AUTO: 0.26 K/UL (ref 0–0.51)
EOSINOPHIL NFR BLD: 3 % (ref 0–6.9)
ERYTHROCYTE [DISTWIDTH] IN BLOOD BY AUTOMATED COUNT: 44.7 FL (ref 35.9–50)
ERYTHROCYTE [SEDIMENTATION RATE] IN BLOOD BY WESTERGREN METHOD: 9 MM/HOUR (ref 0–25)
GFR SERPLBLD CREATININE-BSD FMLA CKD-EPI: 73 ML/MIN/1.73 M 2
GLOBULIN SER CALC-MCNC: 3.5 G/DL (ref 1.9–3.5)
GLUCOSE SERPL-MCNC: 98 MG/DL (ref 65–99)
HCT VFR BLD AUTO: 43.8 % (ref 37–47)
HGB BLD-MCNC: 14.7 G/DL (ref 12–16)
IMM GRANULOCYTES # BLD AUTO: 0.03 K/UL (ref 0–0.11)
IMM GRANULOCYTES NFR BLD AUTO: 0.3 % (ref 0–0.9)
LDH SERPL L TO P-CCNC: 216 U/L (ref 107–266)
LYMPHOCYTES # BLD AUTO: 2.02 K/UL (ref 1–4.8)
LYMPHOCYTES NFR BLD: 22.9 % (ref 22–41)
MCH RBC QN AUTO: 31.2 PG (ref 27–33)
MCHC RBC AUTO-ENTMCNC: 33.6 G/DL (ref 33.6–35)
MCV RBC AUTO: 93 FL (ref 81.4–97.8)
MONOCYTES # BLD AUTO: 0.79 K/UL (ref 0–0.85)
MONOCYTES NFR BLD AUTO: 9 % (ref 0–13.4)
NEUTROPHILS # BLD AUTO: 5.64 K/UL (ref 2–7.15)
NEUTROPHILS NFR BLD: 64 % (ref 44–72)
NRBC # BLD AUTO: 0 K/UL
NRBC BLD-RTO: 0 /100 WBC
PLATELET # BLD AUTO: 322 K/UL (ref 164–446)
PMV BLD AUTO: 10 FL (ref 9–12.9)
POTASSIUM SERPL-SCNC: 4.6 MMOL/L (ref 3.6–5.5)
PROT SERPL-MCNC: 7.8 G/DL (ref 6–8.2)
RBC # BLD AUTO: 4.71 M/UL (ref 4.2–5.4)
SODIUM SERPL-SCNC: 134 MMOL/L (ref 135–145)
WBC # BLD AUTO: 8.8 K/UL (ref 4.8–10.8)

## 2022-05-03 PROCEDURE — 85025 COMPLETE CBC W/AUTO DIFF WBC: CPT

## 2022-05-03 PROCEDURE — 85652 RBC SED RATE AUTOMATED: CPT

## 2022-05-03 PROCEDURE — 82550 ASSAY OF CK (CPK): CPT

## 2022-05-03 PROCEDURE — 36415 COLL VENOUS BLD VENIPUNCTURE: CPT

## 2022-05-03 PROCEDURE — 80053 COMPREHEN METABOLIC PANEL: CPT

## 2022-05-03 PROCEDURE — 86140 C-REACTIVE PROTEIN: CPT

## 2022-05-03 PROCEDURE — 83615 LACTATE (LD) (LDH) ENZYME: CPT

## 2022-05-06 RX ORDER — METHYLPREDNISOLONE SODIUM SUCCINATE 125 MG/2ML
100 INJECTION, POWDER, LYOPHILIZED, FOR SOLUTION INTRAMUSCULAR; INTRAVENOUS ONCE
Status: CANCELLED | OUTPATIENT
Start: 2022-05-06 | End: 2022-05-06

## 2022-05-06 RX ORDER — ACETAMINOPHEN 325 MG/1
650 TABLET ORAL ONCE
Status: CANCELLED | OUTPATIENT
Start: 2022-05-06

## 2022-10-10 NOTE — ASSESSMENT & PLAN NOTE
Chronic and uncontrolled, adamant about not wearing CPAP.  She understands the risks of not treating AZAM.   [FreeTextEntry1] : 45 yo  LMP 22 for annual. ParaGard IUD.\par \par ParaGard IUD\par -IUD string check\par -IUD to be removed \par - considering vasectomy\par \par HCM\par -pap/hpv done today\par -Discussed and reviewed importance of monthly BSE\par -Vit D/Calcium/exercise, BMD\par -Denies STI testing. Safe sexual practices discussed.\par -Rx mammo/sono\par -f/u PCP for recommended HCM, vaccinations, and CA screening\par \par Rto 1 yr for annual or sooner if needed\par \par

## 2023-01-10 ENCOUNTER — HOSPITAL ENCOUNTER (OUTPATIENT)
Dept: LAB | Facility: MEDICAL CENTER | Age: 73
End: 2023-01-10
Attending: INTERNAL MEDICINE
Payer: MEDICARE

## 2023-01-10 LAB
ALBUMIN SERPL BCP-MCNC: 4 G/DL (ref 3.2–4.9)
ALBUMIN SERPL BCP-MCNC: 4 G/DL (ref 3.2–4.9)
ALBUMIN/GLOB SERPL: 1.1 G/DL
ALBUMIN/GLOB SERPL: 1.1 G/DL
ALP SERPL-CCNC: 110 U/L (ref 30–99)
ALP SERPL-CCNC: 111 U/L (ref 30–99)
ALT SERPL-CCNC: 19 U/L (ref 2–50)
ALT SERPL-CCNC: 20 U/L (ref 2–50)
ANION GAP SERPL CALC-SCNC: 10 MMOL/L (ref 7–16)
ANION GAP SERPL CALC-SCNC: 9 MMOL/L (ref 7–16)
AST SERPL-CCNC: 17 U/L (ref 12–45)
AST SERPL-CCNC: 18 U/L (ref 12–45)
BASOPHILS # BLD AUTO: 1 % (ref 0–1.8)
BASOPHILS # BLD: 0.07 K/UL (ref 0–0.12)
BILIRUB SERPL-MCNC: 0.2 MG/DL (ref 0.1–1.5)
BILIRUB SERPL-MCNC: 0.3 MG/DL (ref 0.1–1.5)
BUN SERPL-MCNC: 17 MG/DL (ref 8–22)
BUN SERPL-MCNC: 17 MG/DL (ref 8–22)
CALCIUM ALBUM COR SERPL-MCNC: 9.4 MG/DL (ref 8.5–10.5)
CALCIUM ALBUM COR SERPL-MCNC: 9.4 MG/DL (ref 8.5–10.5)
CALCIUM SERPL-MCNC: 9.4 MG/DL (ref 8.5–10.5)
CALCIUM SERPL-MCNC: 9.4 MG/DL (ref 8.5–10.5)
CHLORIDE SERPL-SCNC: 102 MMOL/L (ref 96–112)
CHLORIDE SERPL-SCNC: 102 MMOL/L (ref 96–112)
CHOLEST SERPL-MCNC: 217 MG/DL (ref 100–199)
CK SERPL-CCNC: 132 U/L (ref 0–154)
CO2 SERPL-SCNC: 26 MMOL/L (ref 20–33)
CO2 SERPL-SCNC: 26 MMOL/L (ref 20–33)
CREAT SERPL-MCNC: 0.85 MG/DL (ref 0.5–1.4)
CREAT SERPL-MCNC: 0.86 MG/DL (ref 0.5–1.4)
CRP SERPL HS-MCNC: 1.84 MG/DL (ref 0–0.75)
EOSINOPHIL # BLD AUTO: 0.37 K/UL (ref 0–0.51)
EOSINOPHIL NFR BLD: 5 % (ref 0–6.9)
ERYTHROCYTE [DISTWIDTH] IN BLOOD BY AUTOMATED COUNT: 47.1 FL (ref 35.9–50)
ERYTHROCYTE [SEDIMENTATION RATE] IN BLOOD BY WESTERGREN METHOD: 55 MM/HOUR (ref 0–25)
FASTING STATUS PATIENT QL REPORTED: NORMAL
GFR SERPLBLD CREATININE-BSD FMLA CKD-EPI: 72 ML/MIN/1.73 M 2
GFR SERPLBLD CREATININE-BSD FMLA CKD-EPI: 74 ML/MIN/1.73 M 2
GLOBULIN SER CALC-MCNC: 3.7 G/DL (ref 1.9–3.5)
GLOBULIN SER CALC-MCNC: 3.8 G/DL (ref 1.9–3.5)
GLUCOSE SERPL-MCNC: 93 MG/DL (ref 65–99)
GLUCOSE SERPL-MCNC: 93 MG/DL (ref 65–99)
HCT VFR BLD AUTO: 35.9 % (ref 37–47)
HDLC SERPL-MCNC: 39 MG/DL
HGB BLD-MCNC: 11.5 G/DL (ref 12–16)
IMM GRANULOCYTES # BLD AUTO: 0.03 K/UL (ref 0–0.11)
IMM GRANULOCYTES NFR BLD AUTO: 0.4 % (ref 0–0.9)
LDLC SERPL CALC-MCNC: 156 MG/DL
LYMPHOCYTES # BLD AUTO: 1.67 K/UL (ref 1–4.8)
LYMPHOCYTES NFR BLD: 22.8 % (ref 22–41)
MCH RBC QN AUTO: 28 PG (ref 27–33)
MCHC RBC AUTO-ENTMCNC: 32 G/DL (ref 33.6–35)
MCV RBC AUTO: 87.6 FL (ref 81.4–97.8)
MONOCYTES # BLD AUTO: 0.7 K/UL (ref 0–0.85)
MONOCYTES NFR BLD AUTO: 9.5 % (ref 0–13.4)
NEUTROPHILS # BLD AUTO: 4.5 K/UL (ref 2–7.15)
NEUTROPHILS NFR BLD: 61.3 % (ref 44–72)
NRBC # BLD AUTO: 0 K/UL
NRBC BLD-RTO: 0 /100 WBC
PLATELET # BLD AUTO: 346 K/UL (ref 164–446)
PMV BLD AUTO: 9.4 FL (ref 9–12.9)
POTASSIUM SERPL-SCNC: 4.4 MMOL/L (ref 3.6–5.5)
POTASSIUM SERPL-SCNC: 4.6 MMOL/L (ref 3.6–5.5)
PROT SERPL-MCNC: 7.7 G/DL (ref 6–8.2)
PROT SERPL-MCNC: 7.8 G/DL (ref 6–8.2)
RBC # BLD AUTO: 4.1 M/UL (ref 4.2–5.4)
SODIUM SERPL-SCNC: 137 MMOL/L (ref 135–145)
SODIUM SERPL-SCNC: 138 MMOL/L (ref 135–145)
TRIGL SERPL-MCNC: 110 MG/DL (ref 0–149)
WBC # BLD AUTO: 7.3 K/UL (ref 4.8–10.8)

## 2023-01-10 PROCEDURE — 86140 C-REACTIVE PROTEIN: CPT

## 2023-01-10 PROCEDURE — 80061 LIPID PANEL: CPT

## 2023-01-10 PROCEDURE — 36415 COLL VENOUS BLD VENIPUNCTURE: CPT

## 2023-01-10 PROCEDURE — 82550 ASSAY OF CK (CPK): CPT

## 2023-01-10 PROCEDURE — 85652 RBC SED RATE AUTOMATED: CPT

## 2023-01-10 PROCEDURE — 80053 COMPREHEN METABOLIC PANEL: CPT

## 2023-01-10 PROCEDURE — 80053 COMPREHEN METABOLIC PANEL: CPT | Mod: 91

## 2023-01-10 PROCEDURE — 85025 COMPLETE CBC W/AUTO DIFF WBC: CPT

## 2023-03-03 ENCOUNTER — HOSPITAL ENCOUNTER (OUTPATIENT)
Dept: LAB | Facility: MEDICAL CENTER | Age: 73
End: 2023-03-03
Attending: NURSE PRACTITIONER
Payer: MEDICARE

## 2023-03-03 LAB
BASOPHILS # BLD AUTO: 1.1 % (ref 0–1.8)
BASOPHILS # BLD: 0.09 K/UL (ref 0–0.12)
EOSINOPHIL # BLD AUTO: 0.47 K/UL (ref 0–0.51)
EOSINOPHIL NFR BLD: 5.9 % (ref 0–6.9)
ERYTHROCYTE [DISTWIDTH] IN BLOOD BY AUTOMATED COUNT: 53.9 FL (ref 35.9–50)
ERYTHROCYTE [SEDIMENTATION RATE] IN BLOOD BY WESTERGREN METHOD: 47 MM/HOUR (ref 0–25)
HCT VFR BLD AUTO: 39 % (ref 37–47)
HGB BLD-MCNC: 12.7 G/DL (ref 12–16)
IMM GRANULOCYTES # BLD AUTO: 0.03 K/UL (ref 0–0.11)
IMM GRANULOCYTES NFR BLD AUTO: 0.4 % (ref 0–0.9)
LYMPHOCYTES # BLD AUTO: 1.91 K/UL (ref 1–4.8)
LYMPHOCYTES NFR BLD: 24.1 % (ref 22–41)
MCH RBC QN AUTO: 28.5 PG (ref 27–33)
MCHC RBC AUTO-ENTMCNC: 32.6 G/DL (ref 33.6–35)
MCV RBC AUTO: 87.4 FL (ref 81.4–97.8)
MONOCYTES # BLD AUTO: 1.1 K/UL (ref 0–0.85)
MONOCYTES NFR BLD AUTO: 13.9 % (ref 0–13.4)
NEUTROPHILS # BLD AUTO: 4.32 K/UL (ref 2–7.15)
NEUTROPHILS NFR BLD: 54.6 % (ref 44–72)
NRBC # BLD AUTO: 0 K/UL
NRBC BLD-RTO: 0 /100 WBC
PLATELET # BLD AUTO: 335 K/UL (ref 164–446)
PMV BLD AUTO: 9.9 FL (ref 9–12.9)
RBC # BLD AUTO: 4.46 M/UL (ref 4.2–5.4)
WBC # BLD AUTO: 7.9 K/UL (ref 4.8–10.8)

## 2023-03-03 PROCEDURE — 85652 RBC SED RATE AUTOMATED: CPT

## 2023-03-03 PROCEDURE — 36415 COLL VENOUS BLD VENIPUNCTURE: CPT

## 2023-03-03 PROCEDURE — 85025 COMPLETE CBC W/AUTO DIFF WBC: CPT

## 2023-03-03 PROCEDURE — 86140 C-REACTIVE PROTEIN: CPT

## 2023-03-03 PROCEDURE — 80053 COMPREHEN METABOLIC PANEL: CPT

## 2023-03-04 LAB
ALBUMIN SERPL BCP-MCNC: 4.1 G/DL (ref 3.2–4.9)
ALBUMIN/GLOB SERPL: 1.1 G/DL
ALP SERPL-CCNC: 111 U/L (ref 30–99)
ALT SERPL-CCNC: 23 U/L (ref 2–50)
ANION GAP SERPL CALC-SCNC: 11 MMOL/L (ref 7–16)
AST SERPL-CCNC: 18 U/L (ref 12–45)
BILIRUB SERPL-MCNC: 0.2 MG/DL (ref 0.1–1.5)
BUN SERPL-MCNC: 22 MG/DL (ref 8–22)
CALCIUM ALBUM COR SERPL-MCNC: 9.4 MG/DL (ref 8.5–10.5)
CALCIUM SERPL-MCNC: 9.5 MG/DL (ref 8.5–10.5)
CHLORIDE SERPL-SCNC: 102 MMOL/L (ref 96–112)
CO2 SERPL-SCNC: 23 MMOL/L (ref 20–33)
CREAT SERPL-MCNC: 0.97 MG/DL (ref 0.5–1.4)
CRP SERPL HS-MCNC: 1.73 MG/DL (ref 0–0.75)
GFR SERPLBLD CREATININE-BSD FMLA CKD-EPI: 63 ML/MIN/1.73 M 2
GLOBULIN SER CALC-MCNC: 3.7 G/DL (ref 1.9–3.5)
GLUCOSE SERPL-MCNC: 93 MG/DL (ref 65–99)
POTASSIUM SERPL-SCNC: 5 MMOL/L (ref 3.6–5.5)
PROT SERPL-MCNC: 7.8 G/DL (ref 6–8.2)
SODIUM SERPL-SCNC: 136 MMOL/L (ref 135–145)

## 2023-04-07 RX ORDER — METHYLPREDNISOLONE SODIUM SUCCINATE 125 MG/2ML
80 INJECTION, POWDER, LYOPHILIZED, FOR SOLUTION INTRAMUSCULAR; INTRAVENOUS ONCE
Status: CANCELLED | OUTPATIENT
Start: 2023-04-07 | End: 2023-04-07

## 2023-05-10 ENCOUNTER — OUTPATIENT INFUSION SERVICES (OUTPATIENT)
Dept: ONCOLOGY | Facility: MEDICAL CENTER | Age: 73
End: 2023-05-10
Attending: NURSE PRACTITIONER
Payer: MEDICARE

## 2023-05-10 VITALS
WEIGHT: 177.47 LBS | OXYGEN SATURATION: 92 % | TEMPERATURE: 96.9 F | SYSTOLIC BLOOD PRESSURE: 123 MMHG | RESPIRATION RATE: 18 BRPM | HEART RATE: 69 BPM | HEIGHT: 65 IN | DIASTOLIC BLOOD PRESSURE: 71 MMHG | BODY MASS INDEX: 29.57 KG/M2

## 2023-05-10 DIAGNOSIS — F41.9 ANXIETY HYPERVENTILATION: ICD-10-CM

## 2023-05-10 DIAGNOSIS — M06.09 RHEUMATOID ARTHRITIS OF MULTIPLE SITES WITHOUT RHEUMATOID FACTOR (HCC): ICD-10-CM

## 2023-05-10 DIAGNOSIS — T79.6XXS TRAUMATIC RHABDOMYOLYSIS, SEQUELA: ICD-10-CM

## 2023-05-10 DIAGNOSIS — M06.09 RHEUMATOID ARTHRITIS OF MULTIPLE SITES WITH NEGATIVE RHEUMATOID FACTOR (HCC): ICD-10-CM

## 2023-05-10 DIAGNOSIS — F45.8 ANXIETY HYPERVENTILATION: ICD-10-CM

## 2023-05-10 DIAGNOSIS — E78.5 HYPERLIPIDEMIA, UNSPECIFIED HYPERLIPIDEMIA TYPE: ICD-10-CM

## 2023-05-10 DIAGNOSIS — M60.89 OTHER MYOSITIS, MULTIPLE SITES: ICD-10-CM

## 2023-05-10 LAB
ALBUMIN SERPL BCP-MCNC: 3.8 G/DL (ref 3.2–4.9)
ALBUMIN/GLOB SERPL: 1.1 G/DL
ALP SERPL-CCNC: 90 U/L (ref 30–99)
ALT SERPL-CCNC: 21 U/L (ref 2–50)
ANION GAP SERPL CALC-SCNC: 14 MMOL/L (ref 7–16)
AST SERPL-CCNC: 19 U/L (ref 12–45)
BASOPHILS # BLD AUTO: 0.8 % (ref 0–1.8)
BASOPHILS # BLD: 0.05 K/UL (ref 0–0.12)
BILIRUB SERPL-MCNC: 0.3 MG/DL (ref 0.1–1.5)
BUN SERPL-MCNC: 20 MG/DL (ref 8–22)
CALCIUM ALBUM COR SERPL-MCNC: 9 MG/DL (ref 8.5–10.5)
CALCIUM SERPL-MCNC: 8.8 MG/DL (ref 8.5–10.5)
CHLORIDE SERPL-SCNC: 104 MMOL/L (ref 96–112)
CHOLEST SERPL-MCNC: 200 MG/DL (ref 100–199)
CK SERPL-CCNC: 154 U/L (ref 0–154)
CO2 SERPL-SCNC: 22 MMOL/L (ref 20–33)
CREAT SERPL-MCNC: 0.93 MG/DL (ref 0.5–1.4)
CRP SERPL HS-MCNC: 1.56 MG/DL (ref 0–0.75)
EOSINOPHIL # BLD AUTO: 0.32 K/UL (ref 0–0.51)
EOSINOPHIL NFR BLD: 5.1 % (ref 0–6.9)
ERYTHROCYTE [DISTWIDTH] IN BLOOD BY AUTOMATED COUNT: 44.9 FL (ref 35.9–50)
ERYTHROCYTE [SEDIMENTATION RATE] IN BLOOD BY WESTERGREN METHOD: 44 MM/HOUR (ref 0–25)
EST. AVERAGE GLUCOSE BLD GHB EST-MCNC: 108 MG/DL
GFR SERPLBLD CREATININE-BSD FMLA CKD-EPI: 66 ML/MIN/1.73 M 2
GLOBULIN SER CALC-MCNC: 3.5 G/DL (ref 1.9–3.5)
GLUCOSE SERPL-MCNC: 95 MG/DL (ref 65–99)
HBA1C MFR BLD: 5.4 % (ref 4–5.6)
HBV CORE AB SERPL QL IA: NONREACTIVE
HBV SURFACE AG SER QL: NORMAL
HCT VFR BLD AUTO: 35.2 % (ref 37–47)
HDLC SERPL-MCNC: 39 MG/DL
HGB BLD-MCNC: 11.5 G/DL (ref 12–16)
IMM GRANULOCYTES # BLD AUTO: 0.03 K/UL (ref 0–0.11)
IMM GRANULOCYTES NFR BLD AUTO: 0.5 % (ref 0–0.9)
LDLC SERPL CALC-MCNC: 134 MG/DL
LYMPHOCYTES # BLD AUTO: 1.62 K/UL (ref 1–4.8)
LYMPHOCYTES NFR BLD: 26 % (ref 22–41)
MCH RBC QN AUTO: 28.8 PG (ref 27–33)
MCHC RBC AUTO-ENTMCNC: 32.7 G/DL (ref 33.6–35)
MCV RBC AUTO: 88.2 FL (ref 81.4–97.8)
MONOCYTES # BLD AUTO: 0.66 K/UL (ref 0–0.85)
MONOCYTES NFR BLD AUTO: 10.6 % (ref 0–13.4)
NEUTROPHILS # BLD AUTO: 3.56 K/UL (ref 2–7.15)
NEUTROPHILS NFR BLD: 57 % (ref 44–72)
NRBC # BLD AUTO: 0 K/UL
NRBC BLD-RTO: 0 /100 WBC
OUTPT INFUS CBC COMMENT OICOM: ABNORMAL
PLATELET # BLD AUTO: 243 K/UL (ref 164–446)
PMV BLD AUTO: 9.4 FL (ref 9–12.9)
POTASSIUM SERPL-SCNC: 4.3 MMOL/L (ref 3.6–5.5)
PROT SERPL-MCNC: 7.3 G/DL (ref 6–8.2)
RBC # BLD AUTO: 3.99 M/UL (ref 4.2–5.4)
SODIUM SERPL-SCNC: 140 MMOL/L (ref 135–145)
TRIGL SERPL-MCNC: 135 MG/DL (ref 0–149)
TSH SERPL DL<=0.005 MIU/L-ACNC: 3.23 UIU/ML (ref 0.38–5.33)
WBC # BLD AUTO: 6.2 K/UL (ref 4.8–10.8)

## 2023-05-10 PROCEDURE — 700111 HCHG RX REV CODE 636 W/ 250 OVERRIDE (IP): Performed by: NURSE PRACTITIONER

## 2023-05-10 PROCEDURE — 84443 ASSAY THYROID STIM HORMONE: CPT

## 2023-05-10 PROCEDURE — 87340 HEPATITIS B SURFACE AG IA: CPT

## 2023-05-10 PROCEDURE — 700105 HCHG RX REV CODE 258: Performed by: INTERNAL MEDICINE

## 2023-05-10 PROCEDURE — 82784 ASSAY IGA/IGD/IGG/IGM EACH: CPT

## 2023-05-10 PROCEDURE — 96415 CHEMO IV INFUSION ADDL HR: CPT

## 2023-05-10 PROCEDURE — 700102 HCHG RX REV CODE 250 W/ 637 OVERRIDE(OP): Performed by: INTERNAL MEDICINE

## 2023-05-10 PROCEDURE — 85025 COMPLETE CBC W/AUTO DIFF WBC: CPT

## 2023-05-10 PROCEDURE — 80053 COMPREHEN METABOLIC PANEL: CPT

## 2023-05-10 PROCEDURE — 700111 HCHG RX REV CODE 636 W/ 250 OVERRIDE (IP): Performed by: INTERNAL MEDICINE

## 2023-05-10 PROCEDURE — 83036 HEMOGLOBIN GLYCOSYLATED A1C: CPT

## 2023-05-10 PROCEDURE — 86704 HEP B CORE ANTIBODY TOTAL: CPT

## 2023-05-10 PROCEDURE — 96375 TX/PRO/DX INJ NEW DRUG ADDON: CPT

## 2023-05-10 PROCEDURE — 96413 CHEMO IV INFUSION 1 HR: CPT

## 2023-05-10 PROCEDURE — 85652 RBC SED RATE AUTOMATED: CPT

## 2023-05-10 PROCEDURE — 82550 ASSAY OF CK (CPK): CPT

## 2023-05-10 PROCEDURE — 80061 LIPID PANEL: CPT

## 2023-05-10 PROCEDURE — A9270 NON-COVERED ITEM OR SERVICE: HCPCS | Performed by: INTERNAL MEDICINE

## 2023-05-10 PROCEDURE — 86140 C-REACTIVE PROTEIN: CPT

## 2023-05-10 RX ORDER — METHYLPREDNISOLONE SODIUM SUCCINATE 125 MG/2ML
80 INJECTION, POWDER, LYOPHILIZED, FOR SOLUTION INTRAMUSCULAR; INTRAVENOUS ONCE
Status: CANCELLED | OUTPATIENT
Start: 2023-05-19 | End: 2023-05-19

## 2023-05-10 RX ORDER — IBUPROFEN 800 MG/1
800 TABLET ORAL EVERY 8 HOURS PRN
COMMUNITY

## 2023-05-10 RX ORDER — ACETAMINOPHEN 325 MG/1
650 TABLET ORAL ONCE
Status: COMPLETED | OUTPATIENT
Start: 2023-05-10 | End: 2023-05-10

## 2023-05-10 RX ORDER — CHLORAL HYDRATE 500 MG
1000 CAPSULE ORAL
COMMUNITY

## 2023-05-10 RX ORDER — METHYLPREDNISOLONE SODIUM SUCCINATE 125 MG/2ML
80 INJECTION, POWDER, LYOPHILIZED, FOR SOLUTION INTRAMUSCULAR; INTRAVENOUS ONCE
Status: COMPLETED | OUTPATIENT
Start: 2023-05-10 | End: 2023-05-10

## 2023-05-10 RX ORDER — AMOXICILLIN 500 MG/1
500 CAPSULE ORAL 3 TIMES DAILY
COMMUNITY

## 2023-05-10 RX ORDER — FAMOTIDINE 20 MG
TABLET ORAL
COMMUNITY

## 2023-05-10 RX ORDER — ACETAMINOPHEN 325 MG/1
650 TABLET ORAL ONCE
Status: CANCELLED | OUTPATIENT
Start: 2023-05-19

## 2023-05-10 RX ADMIN — SODIUM CHLORIDE 1000 MG: 900 INJECTION, SOLUTION INTRAVENOUS at 08:40

## 2023-05-10 RX ADMIN — METHYLPREDNISOLONE SODIUM SUCCINATE 80 MG: 125 INJECTION, POWDER, FOR SOLUTION INTRAMUSCULAR; INTRAVENOUS at 07:52

## 2023-05-10 RX ADMIN — ACETAMINOPHEN 650 MG: 325 TABLET ORAL at 07:40

## 2023-05-10 RX ADMIN — DIPHENHYDRAMINE HYDROCHLORIDE 25 MG: 50 INJECTION, SOLUTION INTRAMUSCULAR; INTRAVENOUS at 08:00

## 2023-05-10 ASSESSMENT — FIBROSIS 4 INDEX: FIB4 SCORE: 0.78

## 2023-05-10 NOTE — PROGRESS NOTES
into [facility] for treatment 1 of 2 of Rituximab / Labs for Rheumatoid arthritis . Pt denied having any new or acute complaints today, reports tolerating past treatments well. PIV started, had + blood return flushed briskly. Pt given Rituximab as prescribed, tolerated well, denied having any complaints during or after infusion. PIV discontinued, bleeding controlled with gauze and coban. Discharge home to self care in [facility]. Appointment confirm for next treatment.

## 2023-05-12 LAB
IGA SERPL-MCNC: 218 MG/DL (ref 68–408)
IGG SERPL-MCNC: 1296 MG/DL (ref 768–1632)
IGM SERPL-MCNC: 169 MG/DL (ref 35–263)

## 2023-05-24 ENCOUNTER — OUTPATIENT INFUSION SERVICES (OUTPATIENT)
Dept: ONCOLOGY | Facility: MEDICAL CENTER | Age: 73
End: 2023-05-24
Attending: NURSE PRACTITIONER
Payer: MEDICARE

## 2023-05-24 VITALS
SYSTOLIC BLOOD PRESSURE: 108 MMHG | RESPIRATION RATE: 18 BRPM | HEART RATE: 77 BPM | TEMPERATURE: 98 F | HEIGHT: 65 IN | BODY MASS INDEX: 29.38 KG/M2 | WEIGHT: 176.37 LBS | DIASTOLIC BLOOD PRESSURE: 59 MMHG | OXYGEN SATURATION: 98 %

## 2023-05-24 DIAGNOSIS — M06.09 RHEUMATOID ARTHRITIS OF MULTIPLE SITES WITH NEGATIVE RHEUMATOID FACTOR (HCC): ICD-10-CM

## 2023-05-24 PROCEDURE — A9270 NON-COVERED ITEM OR SERVICE: HCPCS | Performed by: NURSE PRACTITIONER

## 2023-05-24 PROCEDURE — 700105 HCHG RX REV CODE 258: Performed by: NURSE PRACTITIONER

## 2023-05-24 PROCEDURE — 96375 TX/PRO/DX INJ NEW DRUG ADDON: CPT

## 2023-05-24 PROCEDURE — 700111 HCHG RX REV CODE 636 W/ 250 OVERRIDE (IP): Performed by: NURSE PRACTITIONER

## 2023-05-24 PROCEDURE — 96415 CHEMO IV INFUSION ADDL HR: CPT

## 2023-05-24 PROCEDURE — 700102 HCHG RX REV CODE 250 W/ 637 OVERRIDE(OP): Performed by: NURSE PRACTITIONER

## 2023-05-24 PROCEDURE — 96413 CHEMO IV INFUSION 1 HR: CPT

## 2023-05-24 RX ORDER — ACETAMINOPHEN 325 MG/1
650 TABLET ORAL ONCE
OUTPATIENT
Start: 2023-05-24

## 2023-05-24 RX ORDER — ACETAMINOPHEN 325 MG/1
650 TABLET ORAL ONCE
Status: COMPLETED | OUTPATIENT
Start: 2023-05-24 | End: 2023-05-24

## 2023-05-24 RX ORDER — METHYLPREDNISOLONE SODIUM SUCCINATE 125 MG/2ML
80 INJECTION, POWDER, LYOPHILIZED, FOR SOLUTION INTRAMUSCULAR; INTRAVENOUS ONCE
Status: COMPLETED | OUTPATIENT
Start: 2023-05-24 | End: 2023-05-24

## 2023-05-24 RX ORDER — METHYLPREDNISOLONE SODIUM SUCCINATE 125 MG/2ML
80 INJECTION, POWDER, LYOPHILIZED, FOR SOLUTION INTRAMUSCULAR; INTRAVENOUS ONCE
OUTPATIENT
Start: 2023-05-24 | End: 2023-05-24

## 2023-05-24 RX ADMIN — SODIUM CHLORIDE 1000 MG: 9 INJECTION, SOLUTION INTRAVENOUS at 07:55

## 2023-05-24 RX ADMIN — ACETAMINOPHEN 650 MG: 325 TABLET ORAL at 07:39

## 2023-05-24 RX ADMIN — METHYLPREDNISOLONE SODIUM SUCCINATE 80 MG: 125 INJECTION, POWDER, FOR SOLUTION INTRAMUSCULAR; INTRAVENOUS at 07:39

## 2023-05-24 RX ADMIN — DIPHENHYDRAMINE HYDROCHLORIDE 25 MG: 50 INJECTION, SOLUTION INTRAMUSCULAR; INTRAVENOUS at 07:44

## 2023-05-24 ASSESSMENT — FIBROSIS 4 INDEX: FIB4 SCORE: 1.19

## 2023-05-24 NOTE — PROGRESS NOTES
Pt presents to [facility] for rituximab-pvvr infusion. PIV established in R forearm; brisk blood return noted and pt tolerated well. Rutuximab-pvvr infused per protocol with titration and no s/s of adverse reactions. PIV flushed and brisk blood return observed and then removed; gauze/coband dressing applied. Next appointment confirmed and education provided. Pt discharged to self care with all personal belongings and in NAD.

## 2023-05-30 NOTE — CARE PLAN
Problem: Communication  Goal: The ability to communicate needs accurately and effectively will improve  Outcome: PROGRESSING AS EXPECTED  Pt able to effectively communicate needs to staff members. Continue to encourage communication with staff as well as anticipate possible future needs      Problem: Safety  Goal: Will remain free from injury  Outcome: PROGRESSING AS EXPECTED  Pt remains free from injury/falls this shift. Continue to assess for risks for injury/fall and implement prevention measures as needed        
Problem: Safety  Goal: Will remain free from falls  Outcome: PROGRESSING AS EXPECTED      Problem: Knowledge Deficit  Goal: Knowledge of disease process/condition, treatment plan, diagnostic tests, and medications will improve  Outcome: PROGRESSING AS EXPECTED        
Problem: Safety  Goal: Will remain free from falls  Outcome: PROGRESSING AS EXPECTED  Room free from clutter, pt safely ambulating self with steady gait, calling appropriately for assistance    Problem: Discharge Barriers/Planning  Goal: Patient's continuum of care needs will be met  Outcome: PROGRESSING AS EXPECTED  Fluid running as ordered. Discussed pt concerns regarding steroid administration, holding until pt speaks with physician regarding concerns as she has been told to stay away from them by her cardiologist.      
Detail Level: Zone
Photo Preface (Leave Blank If You Do Not Want): Photographs were obtained today

## 2023-07-06 ENCOUNTER — HOSPITAL ENCOUNTER (OUTPATIENT)
Dept: LAB | Facility: MEDICAL CENTER | Age: 73
End: 2023-07-06
Attending: NURSE PRACTITIONER
Payer: MEDICARE

## 2023-07-06 LAB
CK SERPL-CCNC: 166 U/L (ref 0–154)
CRP SERPL HS-MCNC: 1.43 MG/DL (ref 0–0.75)

## 2023-07-06 PROCEDURE — 82550 ASSAY OF CK (CPK): CPT

## 2023-07-06 PROCEDURE — 86140 C-REACTIVE PROTEIN: CPT

## 2023-07-06 PROCEDURE — 36415 COLL VENOUS BLD VENIPUNCTURE: CPT

## 2023-07-06 PROCEDURE — 85652 RBC SED RATE AUTOMATED: CPT

## 2023-07-07 LAB — ERYTHROCYTE [SEDIMENTATION RATE] IN BLOOD BY WESTERGREN METHOD: 80 MM/HOUR (ref 0–25)

## 2023-07-25 ENCOUNTER — HOSPITAL ENCOUNTER (OUTPATIENT)
Dept: LAB | Facility: MEDICAL CENTER | Age: 73
End: 2023-07-25
Attending: INTERNAL MEDICINE
Payer: MEDICARE

## 2023-07-25 LAB
ALBUMIN SERPL BCP-MCNC: 4.1 G/DL (ref 3.2–4.9)
ALBUMIN/GLOB SERPL: 1.4 G/DL
ALP SERPL-CCNC: 71 U/L (ref 30–99)
ALT SERPL-CCNC: 28 U/L (ref 2–50)
ANION GAP SERPL CALC-SCNC: 11 MMOL/L (ref 7–16)
AST SERPL-CCNC: 15 U/L (ref 12–45)
BASOPHILS # BLD AUTO: 0.9 % (ref 0–1.8)
BASOPHILS # BLD: 0.08 K/UL (ref 0–0.12)
BILIRUB SERPL-MCNC: 0.2 MG/DL (ref 0.1–1.5)
BUN SERPL-MCNC: 19 MG/DL (ref 8–22)
CALCIUM ALBUM COR SERPL-MCNC: 8.8 MG/DL (ref 8.5–10.5)
CALCIUM SERPL-MCNC: 8.9 MG/DL (ref 8.5–10.5)
CHLORIDE SERPL-SCNC: 98 MMOL/L (ref 96–112)
CK SERPL-CCNC: 98 U/L (ref 0–154)
CO2 SERPL-SCNC: 24 MMOL/L (ref 20–33)
CREAT SERPL-MCNC: 0.91 MG/DL (ref 0.5–1.4)
CRP SERPL HS-MCNC: 1.31 MG/DL (ref 0–0.75)
EOSINOPHIL # BLD AUTO: 1.3 K/UL (ref 0–0.51)
EOSINOPHIL NFR BLD: 14 % (ref 0–6.9)
ERYTHROCYTE [DISTWIDTH] IN BLOOD BY AUTOMATED COUNT: 52.9 FL (ref 35.9–50)
ERYTHROCYTE [SEDIMENTATION RATE] IN BLOOD BY WESTERGREN METHOD: 19 MM/HOUR (ref 0–25)
GFR SERPLBLD CREATININE-BSD FMLA CKD-EPI: 68 ML/MIN/1.73 M 2
GLOBULIN SER CALC-MCNC: 2.9 G/DL (ref 1.9–3.5)
GLUCOSE SERPL-MCNC: 82 MG/DL (ref 65–99)
HCT VFR BLD AUTO: 37.5 % (ref 37–47)
HGB BLD-MCNC: 12.2 G/DL (ref 12–16)
IMM GRANULOCYTES # BLD AUTO: 0.04 K/UL (ref 0–0.11)
IMM GRANULOCYTES NFR BLD AUTO: 0.4 % (ref 0–0.9)
LYMPHOCYTES # BLD AUTO: 1.64 K/UL (ref 1–4.8)
LYMPHOCYTES NFR BLD: 17.6 % (ref 22–41)
MCH RBC QN AUTO: 29.8 PG (ref 27–33)
MCHC RBC AUTO-ENTMCNC: 32.5 G/DL (ref 32.2–35.5)
MCV RBC AUTO: 91.7 FL (ref 81.4–97.8)
MONOCYTES # BLD AUTO: 0.92 K/UL (ref 0–0.85)
MONOCYTES NFR BLD AUTO: 9.9 % (ref 0–13.4)
NEUTROPHILS # BLD AUTO: 5.33 K/UL (ref 1.82–7.42)
NEUTROPHILS NFR BLD: 57.2 % (ref 44–72)
NRBC # BLD AUTO: 0 K/UL
NRBC BLD-RTO: 0 /100 WBC (ref 0–0.2)
PLATELET # BLD AUTO: 252 K/UL (ref 164–446)
PMV BLD AUTO: 9.9 FL (ref 9–12.9)
POTASSIUM SERPL-SCNC: 4.1 MMOL/L (ref 3.6–5.5)
PROT SERPL-MCNC: 7 G/DL (ref 6–8.2)
RBC # BLD AUTO: 4.09 M/UL (ref 4.2–5.4)
SODIUM SERPL-SCNC: 133 MMOL/L (ref 135–145)
WBC # BLD AUTO: 9.3 K/UL (ref 4.8–10.8)

## 2023-07-25 PROCEDURE — 85652 RBC SED RATE AUTOMATED: CPT

## 2023-07-25 PROCEDURE — 80053 COMPREHEN METABOLIC PANEL: CPT

## 2023-07-25 PROCEDURE — 86140 C-REACTIVE PROTEIN: CPT

## 2023-07-25 PROCEDURE — 36415 COLL VENOUS BLD VENIPUNCTURE: CPT

## 2023-07-25 PROCEDURE — 85025 COMPLETE CBC W/AUTO DIFF WBC: CPT

## 2023-07-25 PROCEDURE — 82550 ASSAY OF CK (CPK): CPT

## 2023-08-04 ENCOUNTER — HOSPITAL ENCOUNTER (OUTPATIENT)
Dept: RADIOLOGY | Facility: MEDICAL CENTER | Age: 73
End: 2023-08-04
Attending: INTERNAL MEDICINE
Payer: MEDICARE

## 2023-08-04 DIAGNOSIS — M25.551 RIGHT HIP PAIN: ICD-10-CM

## 2023-08-04 DIAGNOSIS — M06.09 RHEUMATOID ARTHRITIS OF MULTIPLE SITES WITHOUT RHEUMATOID FACTOR (HCC): ICD-10-CM

## 2023-08-04 DIAGNOSIS — M60.89 OTHER MYOSITIS, MULTIPLE SITES: ICD-10-CM

## 2023-08-04 PROCEDURE — 73721 MRI JNT OF LWR EXTRE W/O DYE: CPT | Mod: RT

## 2023-08-04 PROCEDURE — 73522 X-RAY EXAM HIPS BI 3-4 VIEWS: CPT

## 2023-08-15 ENCOUNTER — APPOINTMENT (OUTPATIENT)
Dept: LAB | Facility: MEDICAL CENTER | Age: 73
End: 2023-08-15
Payer: MEDICARE

## 2023-09-07 ENCOUNTER — HOSPITAL ENCOUNTER (OUTPATIENT)
Dept: LAB | Facility: MEDICAL CENTER | Age: 73
End: 2023-09-07
Attending: INTERNAL MEDICINE
Payer: MEDICARE

## 2023-09-07 LAB
ALBUMIN SERPL BCP-MCNC: 4.1 G/DL (ref 3.2–4.9)
ALBUMIN/GLOB SERPL: 1.2 G/DL
ALP SERPL-CCNC: 74 U/L (ref 30–99)
ALT SERPL-CCNC: 23 U/L (ref 2–50)
ANION GAP SERPL CALC-SCNC: 11 MMOL/L (ref 7–16)
AST SERPL-CCNC: 22 U/L (ref 12–45)
BASOPHILS # BLD AUTO: 1 % (ref 0–1.8)
BASOPHILS # BLD: 0.07 K/UL (ref 0–0.12)
BILIRUB SERPL-MCNC: 0.2 MG/DL (ref 0.1–1.5)
BUN SERPL-MCNC: 21 MG/DL (ref 8–22)
CALCIUM ALBUM COR SERPL-MCNC: 8.8 MG/DL (ref 8.5–10.5)
CALCIUM SERPL-MCNC: 8.9 MG/DL (ref 8.5–10.5)
CHLORIDE SERPL-SCNC: 103 MMOL/L (ref 96–112)
CHOLEST SERPL-MCNC: 207 MG/DL (ref 100–199)
CO2 SERPL-SCNC: 22 MMOL/L (ref 20–33)
CREAT SERPL-MCNC: 1.1 MG/DL (ref 0.5–1.4)
EOSINOPHIL # BLD AUTO: 0.57 K/UL (ref 0–0.51)
EOSINOPHIL NFR BLD: 8.5 % (ref 0–6.9)
ERYTHROCYTE [DISTWIDTH] IN BLOOD BY AUTOMATED COUNT: 52.3 FL (ref 35.9–50)
FASTING STATUS PATIENT QL REPORTED: NORMAL
GFR SERPLBLD CREATININE-BSD FMLA CKD-EPI: 54 ML/MIN/1.73 M 2
GLOBULIN SER CALC-MCNC: 3.3 G/DL (ref 1.9–3.5)
GLUCOSE SERPL-MCNC: 98 MG/DL (ref 65–99)
HCT VFR BLD AUTO: 38.4 % (ref 37–47)
HDLC SERPL-MCNC: 42 MG/DL
HGB BLD-MCNC: 12.8 G/DL (ref 12–16)
IMM GRANULOCYTES # BLD AUTO: 0.05 K/UL (ref 0–0.11)
IMM GRANULOCYTES NFR BLD AUTO: 0.7 % (ref 0–0.9)
LDLC SERPL CALC-MCNC: 130 MG/DL
LYMPHOCYTES # BLD AUTO: 1.6 K/UL (ref 1–4.8)
LYMPHOCYTES NFR BLD: 23.8 % (ref 22–41)
MCH RBC QN AUTO: 30.8 PG (ref 27–33)
MCHC RBC AUTO-ENTMCNC: 33.3 G/DL (ref 32.2–35.5)
MCV RBC AUTO: 92.5 FL (ref 81.4–97.8)
MONOCYTES # BLD AUTO: 1.13 K/UL (ref 0–0.85)
MONOCYTES NFR BLD AUTO: 16.8 % (ref 0–13.4)
NEUTROPHILS # BLD AUTO: 3.3 K/UL (ref 1.82–7.42)
NEUTROPHILS NFR BLD: 49.2 % (ref 44–72)
NRBC # BLD AUTO: 0 K/UL
NRBC BLD-RTO: 0 /100 WBC (ref 0–0.2)
PLATELET # BLD AUTO: 322 K/UL (ref 164–446)
PMV BLD AUTO: 9.6 FL (ref 9–12.9)
POTASSIUM SERPL-SCNC: 4.5 MMOL/L (ref 3.6–5.5)
PROT SERPL-MCNC: 7.4 G/DL (ref 6–8.2)
RBC # BLD AUTO: 4.15 M/UL (ref 4.2–5.4)
SODIUM SERPL-SCNC: 136 MMOL/L (ref 135–145)
TRIGL SERPL-MCNC: 174 MG/DL (ref 0–149)
TSH SERPL DL<=0.005 MIU/L-ACNC: 2.05 UIU/ML (ref 0.38–5.33)
WBC # BLD AUTO: 6.7 K/UL (ref 4.8–10.8)

## 2023-09-07 PROCEDURE — 80053 COMPREHEN METABOLIC PANEL: CPT

## 2023-09-07 PROCEDURE — 36415 COLL VENOUS BLD VENIPUNCTURE: CPT

## 2023-09-07 PROCEDURE — 85025 COMPLETE CBC W/AUTO DIFF WBC: CPT

## 2023-09-07 PROCEDURE — 80061 LIPID PANEL: CPT

## 2023-09-07 PROCEDURE — 84443 ASSAY THYROID STIM HORMONE: CPT

## 2023-10-26 ENCOUNTER — HOSPITAL ENCOUNTER (OUTPATIENT)
Dept: LAB | Facility: MEDICAL CENTER | Age: 73
End: 2023-10-26
Attending: INTERNAL MEDICINE
Payer: MEDICARE

## 2023-10-26 LAB
CK SERPL-CCNC: 95 U/L (ref 0–154)
CRP SERPL HS-MCNC: 0.4 MG/DL (ref 0–0.75)
ERYTHROCYTE [SEDIMENTATION RATE] IN BLOOD BY WESTERGREN METHOD: 22 MM/HOUR (ref 0–25)

## 2023-10-26 PROCEDURE — 85652 RBC SED RATE AUTOMATED: CPT

## 2023-10-26 PROCEDURE — 36415 COLL VENOUS BLD VENIPUNCTURE: CPT

## 2023-10-26 PROCEDURE — 86140 C-REACTIVE PROTEIN: CPT

## 2023-10-26 PROCEDURE — 82550 ASSAY OF CK (CPK): CPT

## 2023-11-22 ENCOUNTER — HOSPITAL ENCOUNTER (OUTPATIENT)
Dept: RADIOLOGY | Facility: MEDICAL CENTER | Age: 73
End: 2023-11-22
Attending: INTERNAL MEDICINE
Payer: MEDICARE

## 2023-11-22 DIAGNOSIS — Z12.31 VISIT FOR SCREENING MAMMOGRAM: ICD-10-CM

## 2023-11-22 PROCEDURE — 77063 BREAST TOMOSYNTHESIS BI: CPT

## 2023-11-24 ENCOUNTER — OUTPATIENT INFUSION SERVICES (OUTPATIENT)
Dept: ONCOLOGY | Facility: MEDICAL CENTER | Age: 73
End: 2023-11-24
Attending: PHYSICIAN ASSISTANT
Payer: MEDICARE

## 2023-11-24 ENCOUNTER — TELEPHONE (OUTPATIENT)
Dept: ONCOLOGY | Facility: MEDICAL CENTER | Age: 73
End: 2023-11-24

## 2023-11-25 NOTE — PROGRESS NOTES
Orders in plan reviewed. Patient did not have an order for Ruxience dose today. Nurse unable to find on call provider for patient's prescriber. Nurse contacted patient that we would have to reschedule appointment because we would be unable to obtain orders for patient's treatment today. Scheduling called patient to reschedule and patient stated that they are not to be getting this infusion anymore and did not want to be rescheduled.

## 2023-12-06 NOTE — TELEPHONE ENCOUNTER
In addition to increasing creatinine kinase level, white blood count is elevated at 20.  I left patient voicemail this morning explaining the danger to her health of elevated creatinine kinase and white blood count, instructing her to go to the emergency room.   Quality 130: Documentation Of Current Medications In The Medical Record: Current Medications Documented Detail Level: Detailed

## 2023-12-12 ENCOUNTER — HOSPITAL ENCOUNTER (OUTPATIENT)
Dept: LAB | Facility: MEDICAL CENTER | Age: 73
End: 2023-12-12
Attending: NURSE PRACTITIONER
Payer: MEDICARE

## 2023-12-12 LAB
ALBUMIN SERPL BCP-MCNC: 4.3 G/DL (ref 3.2–4.9)
ALBUMIN/GLOB SERPL: 1.3 G/DL
ALP SERPL-CCNC: 71 U/L (ref 30–99)
ALT SERPL-CCNC: 33 U/L (ref 2–50)
ANION GAP SERPL CALC-SCNC: 11 MMOL/L (ref 7–16)
AST SERPL-CCNC: 27 U/L (ref 12–45)
BASOPHILS # BLD AUTO: 0.8 % (ref 0–1.8)
BASOPHILS # BLD: 0.05 K/UL (ref 0–0.12)
BILIRUB SERPL-MCNC: 0.4 MG/DL (ref 0.1–1.5)
BUN SERPL-MCNC: 18 MG/DL (ref 8–22)
CALCIUM ALBUM COR SERPL-MCNC: 9.2 MG/DL (ref 8.5–10.5)
CALCIUM SERPL-MCNC: 9.4 MG/DL (ref 8.5–10.5)
CHLORIDE SERPL-SCNC: 101 MMOL/L (ref 96–112)
CK SERPL-CCNC: 70 U/L (ref 0–154)
CO2 SERPL-SCNC: 25 MMOL/L (ref 20–33)
CREAT SERPL-MCNC: 0.93 MG/DL (ref 0.5–1.4)
CRP SERPL HS-MCNC: 0.6 MG/DL (ref 0–0.75)
EOSINOPHIL # BLD AUTO: 0.26 K/UL (ref 0–0.51)
EOSINOPHIL NFR BLD: 4.1 % (ref 0–6.9)
ERYTHROCYTE [DISTWIDTH] IN BLOOD BY AUTOMATED COUNT: 47.6 FL (ref 35.9–50)
ERYTHROCYTE [SEDIMENTATION RATE] IN BLOOD BY WESTERGREN METHOD: 23 MM/HOUR (ref 0–25)
GFR SERPLBLD CREATININE-BSD FMLA CKD-EPI: 66 ML/MIN/1.73 M 2
GLOBULIN SER CALC-MCNC: 3.4 G/DL (ref 1.9–3.5)
GLUCOSE SERPL-MCNC: 96 MG/DL (ref 65–99)
HCT VFR BLD AUTO: 39.6 % (ref 37–47)
HGB BLD-MCNC: 13.3 G/DL (ref 12–16)
IMM GRANULOCYTES # BLD AUTO: 0.01 K/UL (ref 0–0.11)
IMM GRANULOCYTES NFR BLD AUTO: 0.2 % (ref 0–0.9)
LYMPHOCYTES # BLD AUTO: 1.51 K/UL (ref 1–4.8)
LYMPHOCYTES NFR BLD: 23.6 % (ref 22–41)
MCH RBC QN AUTO: 31.4 PG (ref 27–33)
MCHC RBC AUTO-ENTMCNC: 33.6 G/DL (ref 32.2–35.5)
MCV RBC AUTO: 93.4 FL (ref 81.4–97.8)
MONOCYTES # BLD AUTO: 0.42 K/UL (ref 0–0.85)
MONOCYTES NFR BLD AUTO: 6.6 % (ref 0–13.4)
NEUTROPHILS # BLD AUTO: 4.14 K/UL (ref 1.82–7.42)
NEUTROPHILS NFR BLD: 64.7 % (ref 44–72)
NRBC # BLD AUTO: 0 K/UL
NRBC BLD-RTO: 0 /100 WBC (ref 0–0.2)
PLATELET # BLD AUTO: 248 K/UL (ref 164–446)
PMV BLD AUTO: 10.3 FL (ref 9–12.9)
POTASSIUM SERPL-SCNC: 4.5 MMOL/L (ref 3.6–5.5)
PROT SERPL-MCNC: 7.7 G/DL (ref 6–8.2)
RBC # BLD AUTO: 4.24 M/UL (ref 4.2–5.4)
SODIUM SERPL-SCNC: 137 MMOL/L (ref 135–145)
WBC # BLD AUTO: 6.4 K/UL (ref 4.8–10.8)

## 2023-12-12 PROCEDURE — 85652 RBC SED RATE AUTOMATED: CPT

## 2023-12-12 PROCEDURE — 80053 COMPREHEN METABOLIC PANEL: CPT

## 2023-12-12 PROCEDURE — 36415 COLL VENOUS BLD VENIPUNCTURE: CPT

## 2023-12-12 PROCEDURE — 82550 ASSAY OF CK (CPK): CPT

## 2023-12-12 PROCEDURE — 85025 COMPLETE CBC W/AUTO DIFF WBC: CPT

## 2023-12-12 PROCEDURE — 86140 C-REACTIVE PROTEIN: CPT

## 2023-12-19 PROBLEM — G56.01 RIGHT CARPAL TUNNEL SYNDROME: Status: ACTIVE | Noted: 2023-12-19

## 2024-01-09 ENCOUNTER — HOSPITAL ENCOUNTER (OUTPATIENT)
Dept: LAB | Facility: MEDICAL CENTER | Age: 74
End: 2024-01-09
Attending: INTERNAL MEDICINE
Payer: MEDICARE

## 2024-01-09 LAB
CHOLEST SERPL-MCNC: 120 MG/DL (ref 100–199)
FASTING STATUS PATIENT QL REPORTED: NORMAL
HDLC SERPL-MCNC: 52 MG/DL
LDLC SERPL CALC-MCNC: 47 MG/DL
TRIGL SERPL-MCNC: 105 MG/DL (ref 0–149)

## 2024-01-09 PROCEDURE — 80061 LIPID PANEL: CPT

## 2024-01-09 PROCEDURE — 36415 COLL VENOUS BLD VENIPUNCTURE: CPT

## 2024-02-22 PROBLEM — G56.02 LEFT CARPAL TUNNEL SYNDROME: Status: ACTIVE | Noted: 2024-02-22

## 2024-03-15 ENCOUNTER — HOSPITAL ENCOUNTER (OUTPATIENT)
Dept: LAB | Facility: MEDICAL CENTER | Age: 74
End: 2024-03-15
Attending: INTERNAL MEDICINE
Payer: MEDICARE

## 2024-03-15 LAB
ALBUMIN SERPL BCP-MCNC: 4.3 G/DL (ref 3.2–4.9)
ALBUMIN SERPL BCP-MCNC: 4.3 G/DL (ref 3.2–4.9)
ALBUMIN/GLOB SERPL: 1.2 G/DL
ALBUMIN/GLOB SERPL: 1.2 G/DL
ALP SERPL-CCNC: 75 U/L (ref 30–99)
ALP SERPL-CCNC: 75 U/L (ref 30–99)
ALT SERPL-CCNC: 38 U/L (ref 2–50)
ALT SERPL-CCNC: 40 U/L (ref 2–50)
ANION GAP SERPL CALC-SCNC: 10 MMOL/L (ref 7–16)
ANION GAP SERPL CALC-SCNC: 12 MMOL/L (ref 7–16)
AST SERPL-CCNC: 27 U/L (ref 12–45)
AST SERPL-CCNC: 27 U/L (ref 12–45)
BASOPHILS # BLD AUTO: 0.7 % (ref 0–1.8)
BASOPHILS # BLD: 0.04 K/UL (ref 0–0.12)
BILIRUB SERPL-MCNC: 0.4 MG/DL (ref 0.1–1.5)
BILIRUB SERPL-MCNC: 0.4 MG/DL (ref 0.1–1.5)
BUN SERPL-MCNC: 22 MG/DL (ref 8–22)
BUN SERPL-MCNC: 23 MG/DL (ref 8–22)
CALCIUM ALBUM COR SERPL-MCNC: 9 MG/DL (ref 8.5–10.5)
CALCIUM ALBUM COR SERPL-MCNC: 9.2 MG/DL (ref 8.5–10.5)
CALCIUM SERPL-MCNC: 9.2 MG/DL (ref 8.5–10.5)
CALCIUM SERPL-MCNC: 9.4 MG/DL (ref 8.5–10.5)
CHLORIDE SERPL-SCNC: 102 MMOL/L (ref 96–112)
CHLORIDE SERPL-SCNC: 103 MMOL/L (ref 96–112)
CHOLEST SERPL-MCNC: 128 MG/DL (ref 100–199)
CK SERPL-CCNC: 78 U/L (ref 0–154)
CO2 SERPL-SCNC: 25 MMOL/L (ref 20–33)
CO2 SERPL-SCNC: 26 MMOL/L (ref 20–33)
CREAT SERPL-MCNC: 0.9 MG/DL (ref 0.5–1.4)
CREAT SERPL-MCNC: 0.94 MG/DL (ref 0.5–1.4)
CRP SERPL HS-MCNC: 0.43 MG/DL (ref 0–0.75)
EOSINOPHIL # BLD AUTO: 0.27 K/UL (ref 0–0.51)
EOSINOPHIL NFR BLD: 4.6 % (ref 0–6.9)
ERYTHROCYTE [DISTWIDTH] IN BLOOD BY AUTOMATED COUNT: 42.8 FL (ref 35.9–50)
ERYTHROCYTE [SEDIMENTATION RATE] IN BLOOD BY WESTERGREN METHOD: 27 MM/HOUR (ref 0–25)
GFR SERPLBLD CREATININE-BSD FMLA CKD-EPI: 65 ML/MIN/1.73 M 2
GFR SERPLBLD CREATININE-BSD FMLA CKD-EPI: 68 ML/MIN/1.73 M 2
GLOBULIN SER CALC-MCNC: 3.5 G/DL (ref 1.9–3.5)
GLOBULIN SER CALC-MCNC: 3.6 G/DL (ref 1.9–3.5)
GLUCOSE SERPL-MCNC: 94 MG/DL (ref 65–99)
GLUCOSE SERPL-MCNC: 94 MG/DL (ref 65–99)
HCT VFR BLD AUTO: 41.8 % (ref 37–47)
HDLC SERPL-MCNC: 46 MG/DL
HGB BLD-MCNC: 13.9 G/DL (ref 12–16)
IMM GRANULOCYTES # BLD AUTO: 0.01 K/UL (ref 0–0.11)
IMM GRANULOCYTES NFR BLD AUTO: 0.2 % (ref 0–0.9)
LDLC SERPL CALC-MCNC: 59 MG/DL
LYMPHOCYTES # BLD AUTO: 1.63 K/UL (ref 1–4.8)
LYMPHOCYTES NFR BLD: 27.9 % (ref 22–41)
MCH RBC QN AUTO: 31.2 PG (ref 27–33)
MCHC RBC AUTO-ENTMCNC: 33.3 G/DL (ref 32.2–35.5)
MCV RBC AUTO: 93.7 FL (ref 81.4–97.8)
MONOCYTES # BLD AUTO: 0.72 K/UL (ref 0–0.85)
MONOCYTES NFR BLD AUTO: 12.3 % (ref 0–13.4)
NEUTROPHILS # BLD AUTO: 3.17 K/UL (ref 1.82–7.42)
NEUTROPHILS NFR BLD: 54.3 % (ref 44–72)
NRBC # BLD AUTO: 0 K/UL
NRBC BLD-RTO: 0 /100 WBC (ref 0–0.2)
PLATELET # BLD AUTO: 224 K/UL (ref 164–446)
PMV BLD AUTO: 10 FL (ref 9–12.9)
POTASSIUM SERPL-SCNC: 4.6 MMOL/L (ref 3.6–5.5)
POTASSIUM SERPL-SCNC: 4.6 MMOL/L (ref 3.6–5.5)
PROT SERPL-MCNC: 7.8 G/DL (ref 6–8.2)
PROT SERPL-MCNC: 7.9 G/DL (ref 6–8.2)
RBC # BLD AUTO: 4.46 M/UL (ref 4.2–5.4)
SODIUM SERPL-SCNC: 139 MMOL/L (ref 135–145)
SODIUM SERPL-SCNC: 139 MMOL/L (ref 135–145)
TRIGL SERPL-MCNC: 116 MG/DL (ref 0–149)
WBC # BLD AUTO: 5.8 K/UL (ref 4.8–10.8)

## 2024-03-15 PROCEDURE — 80053 COMPREHEN METABOLIC PANEL: CPT

## 2024-03-15 PROCEDURE — 82784 ASSAY IGA/IGD/IGG/IGM EACH: CPT

## 2024-03-15 PROCEDURE — 82550 ASSAY OF CK (CPK): CPT

## 2024-03-15 PROCEDURE — 80053 COMPREHEN METABOLIC PANEL: CPT | Mod: 91

## 2024-03-15 PROCEDURE — 86140 C-REACTIVE PROTEIN: CPT

## 2024-03-15 PROCEDURE — 82306 VITAMIN D 25 HYDROXY: CPT

## 2024-03-15 PROCEDURE — 85652 RBC SED RATE AUTOMATED: CPT

## 2024-03-15 PROCEDURE — 80061 LIPID PANEL: CPT

## 2024-03-15 PROCEDURE — 85025 COMPLETE CBC W/AUTO DIFF WBC: CPT

## 2024-03-15 PROCEDURE — 84443 ASSAY THYROID STIM HORMONE: CPT

## 2024-03-15 PROCEDURE — 36415 COLL VENOUS BLD VENIPUNCTURE: CPT

## 2024-03-16 LAB
25(OH)D3 SERPL-MCNC: 51 NG/ML (ref 30–100)
TSH SERPL DL<=0.005 MIU/L-ACNC: 2.12 UIU/ML (ref 0.38–5.33)

## 2024-03-18 LAB
IGA SERPL-MCNC: 244 MG/DL (ref 68–408)
IGG SERPL-MCNC: 1360 MG/DL (ref 768–1632)
IGM SERPL-MCNC: 165 MG/DL (ref 35–263)

## 2024-06-09 ENCOUNTER — OUTPATIENT INFUSION SERVICES (OUTPATIENT)
Dept: ONCOLOGY | Facility: MEDICAL CENTER | Age: 74
End: 2024-06-09
Attending: NURSE PRACTITIONER
Payer: MEDICARE

## 2024-06-09 VITALS
HEIGHT: 64 IN | RESPIRATION RATE: 20 BRPM | DIASTOLIC BLOOD PRESSURE: 62 MMHG | SYSTOLIC BLOOD PRESSURE: 119 MMHG | WEIGHT: 186.29 LBS | OXYGEN SATURATION: 96 % | TEMPERATURE: 96.9 F | HEART RATE: 82 BPM | BODY MASS INDEX: 31.8 KG/M2

## 2024-06-09 DIAGNOSIS — M06.09 RHEUMATOID ARTHRITIS OF MULTIPLE SITES WITH NEGATIVE RHEUMATOID FACTOR (HCC): ICD-10-CM

## 2024-06-09 PROCEDURE — 96413 CHEMO IV INFUSION 1 HR: CPT

## 2024-06-09 PROCEDURE — 700111 HCHG RX REV CODE 636 W/ 250 OVERRIDE (IP): Mod: JZ | Performed by: NURSE PRACTITIONER

## 2024-06-09 PROCEDURE — 700105 HCHG RX REV CODE 258: Performed by: INTERNAL MEDICINE

## 2024-06-09 PROCEDURE — 96415 CHEMO IV INFUSION ADDL HR: CPT

## 2024-06-09 PROCEDURE — A9270 NON-COVERED ITEM OR SERVICE: HCPCS | Performed by: NURSE PRACTITIONER

## 2024-06-09 PROCEDURE — 96375 TX/PRO/DX INJ NEW DRUG ADDON: CPT

## 2024-06-09 PROCEDURE — 700102 HCHG RX REV CODE 250 W/ 637 OVERRIDE(OP): Performed by: NURSE PRACTITIONER

## 2024-06-09 PROCEDURE — 700111 HCHG RX REV CODE 636 W/ 250 OVERRIDE (IP): Mod: JZ,JG | Performed by: INTERNAL MEDICINE

## 2024-06-09 PROCEDURE — 700105 HCHG RX REV CODE 258: Performed by: NURSE PRACTITIONER

## 2024-06-09 RX ORDER — METHYLPREDNISOLONE SODIUM SUCCINATE 125 MG/2ML
80 INJECTION, POWDER, LYOPHILIZED, FOR SOLUTION INTRAMUSCULAR; INTRAVENOUS ONCE
OUTPATIENT
Start: 2024-06-09 | End: 2024-06-09

## 2024-06-09 RX ORDER — ACETAMINOPHEN 325 MG/1
650 TABLET ORAL ONCE
Status: COMPLETED | OUTPATIENT
Start: 2024-06-09 | End: 2024-06-09

## 2024-06-09 RX ORDER — METHYLPREDNISOLONE SODIUM SUCCINATE 125 MG/2ML
80 INJECTION, POWDER, LYOPHILIZED, FOR SOLUTION INTRAMUSCULAR; INTRAVENOUS ONCE
Status: COMPLETED | OUTPATIENT
Start: 2024-06-09 | End: 2024-06-09

## 2024-06-09 RX ORDER — ACETAMINOPHEN 325 MG/1
650 TABLET ORAL ONCE
OUTPATIENT
Start: 2024-06-09

## 2024-06-09 RX ADMIN — SODIUM CHLORIDE 1000 MG: 9 INJECTION, SOLUTION INTRAVENOUS at 12:25

## 2024-06-09 RX ADMIN — ACETAMINOPHEN 650 MG: 325 TABLET ORAL at 11:53

## 2024-06-09 RX ADMIN — DIPHENHYDRAMINE HYDROCHLORIDE 25 MG: 50 INJECTION, SOLUTION INTRAMUSCULAR; INTRAVENOUS at 11:53

## 2024-06-09 RX ADMIN — METHYLPREDNISOLONE SODIUM SUCCINATE 80 MG: 125 INJECTION, POWDER, FOR SOLUTION INTRAMUSCULAR; INTRAVENOUS at 11:53

## 2024-06-09 ASSESSMENT — FIBROSIS 4 INDEX: FIB4 SCORE: 1.35

## 2024-06-10 NOTE — PROGRESS NOTES
Vane arrives to [facility] for Ruxience for rheumatoid arthritis. Patient denies acute health concerns. Denies s/s active infections. Last Ruxience dose was 5/24/23. Negative hep B panel from 5/10/23. 24g PIV placed to LFA, which flushes easily and has brisk blood return. Premedications administered. Ruxience infused per initial titration rates as it has been over a year since last dose. Patient tolerated infusion well. PIV with brisk blood return post-infusion, flushed, and removed with tip intact. Patient will follow up with Dr. Bond on 6/19 and will determine future POC. Discharged home to self care in no apparent distress.

## 2024-06-12 ENCOUNTER — HOSPITAL ENCOUNTER (OUTPATIENT)
Dept: LAB | Facility: MEDICAL CENTER | Age: 74
End: 2024-06-12
Attending: INTERNAL MEDICINE
Payer: MEDICARE

## 2024-06-12 ENCOUNTER — HOSPITAL ENCOUNTER (OUTPATIENT)
Dept: LAB | Facility: MEDICAL CENTER | Age: 74
End: 2024-06-12
Attending: NURSE PRACTITIONER
Payer: MEDICARE

## 2024-06-12 LAB
ALBUMIN SERPL BCP-MCNC: 3.9 G/DL (ref 3.2–4.9)
ALBUMIN/GLOB SERPL: 1.1 G/DL
ALP SERPL-CCNC: 76 U/L (ref 30–99)
ALT SERPL-CCNC: 31 U/L (ref 2–50)
ANION GAP SERPL CALC-SCNC: 13 MMOL/L (ref 7–16)
AST SERPL-CCNC: 22 U/L (ref 12–45)
BASOPHILS # BLD AUTO: 0.5 % (ref 0–1.8)
BASOPHILS # BLD: 0.05 K/UL (ref 0–0.12)
BILIRUB SERPL-MCNC: 0.4 MG/DL (ref 0.1–1.5)
BUN SERPL-MCNC: 26 MG/DL (ref 8–22)
CALCIUM ALBUM COR SERPL-MCNC: 9.7 MG/DL (ref 8.5–10.5)
CALCIUM SERPL-MCNC: 9.6 MG/DL (ref 8.5–10.5)
CHLORIDE SERPL-SCNC: 104 MMOL/L (ref 96–112)
CHOLEST SERPL-MCNC: 211 MG/DL (ref 100–199)
CK SERPL-CCNC: 57 U/L (ref 0–154)
CO2 SERPL-SCNC: 25 MMOL/L (ref 20–33)
CREAT SERPL-MCNC: 0.92 MG/DL (ref 0.5–1.4)
EOSINOPHIL # BLD AUTO: 0.23 K/UL (ref 0–0.51)
EOSINOPHIL NFR BLD: 2.5 % (ref 0–6.9)
ERYTHROCYTE [DISTWIDTH] IN BLOOD BY AUTOMATED COUNT: 43.6 FL (ref 35.9–50)
ERYTHROCYTE [SEDIMENTATION RATE] IN BLOOD BY WESTERGREN METHOD: 27 MM/HOUR (ref 0–25)
GFR SERPLBLD CREATININE-BSD FMLA CKD-EPI: 66 ML/MIN/1.73 M 2
GLOBULIN SER CALC-MCNC: 3.6 G/DL (ref 1.9–3.5)
GLUCOSE SERPL-MCNC: 102 MG/DL (ref 65–99)
HCT VFR BLD AUTO: 41.2 % (ref 37–47)
HDLC SERPL-MCNC: 35 MG/DL
HGB BLD-MCNC: 13.9 G/DL (ref 12–16)
IMM GRANULOCYTES # BLD AUTO: 0.04 K/UL (ref 0–0.11)
IMM GRANULOCYTES NFR BLD AUTO: 0.4 % (ref 0–0.9)
LDLC SERPL CALC-MCNC: 128 MG/DL
LYMPHOCYTES # BLD AUTO: 1.63 K/UL (ref 1–4.8)
LYMPHOCYTES NFR BLD: 17.5 % (ref 22–41)
MCH RBC QN AUTO: 30.3 PG (ref 27–33)
MCHC RBC AUTO-ENTMCNC: 33.7 G/DL (ref 32.2–35.5)
MCV RBC AUTO: 89.8 FL (ref 81.4–97.8)
MONOCYTES # BLD AUTO: 1.11 K/UL (ref 0–0.85)
MONOCYTES NFR BLD AUTO: 11.9 % (ref 0–13.4)
NEUTROPHILS # BLD AUTO: 6.28 K/UL (ref 1.82–7.42)
NEUTROPHILS NFR BLD: 67.2 % (ref 44–72)
NRBC # BLD AUTO: 0 K/UL
NRBC BLD-RTO: 0 /100 WBC (ref 0–0.2)
PLATELET # BLD AUTO: 240 K/UL (ref 164–446)
PMV BLD AUTO: 10.1 FL (ref 9–12.9)
POTASSIUM SERPL-SCNC: 4.4 MMOL/L (ref 3.6–5.5)
PROT SERPL-MCNC: 7.5 G/DL (ref 6–8.2)
RBC # BLD AUTO: 4.59 M/UL (ref 4.2–5.4)
SODIUM SERPL-SCNC: 142 MMOL/L (ref 135–145)
TRIGL SERPL-MCNC: 240 MG/DL (ref 0–149)
WBC # BLD AUTO: 9.3 K/UL (ref 4.8–10.8)

## 2024-06-12 PROCEDURE — 80053 COMPREHEN METABOLIC PANEL: CPT

## 2024-06-12 PROCEDURE — 85025 COMPLETE CBC W/AUTO DIFF WBC: CPT

## 2024-06-12 PROCEDURE — 36415 COLL VENOUS BLD VENIPUNCTURE: CPT

## 2024-06-12 PROCEDURE — 82550 ASSAY OF CK (CPK): CPT

## 2024-06-12 PROCEDURE — 80061 LIPID PANEL: CPT

## 2024-06-12 PROCEDURE — 85652 RBC SED RATE AUTOMATED: CPT

## 2024-07-25 ENCOUNTER — HOSPITAL ENCOUNTER (OUTPATIENT)
Dept: RADIOLOGY | Facility: MEDICAL CENTER | Age: 74
End: 2024-07-25
Attending: INTERNAL MEDICINE
Payer: MEDICARE

## 2024-07-25 DIAGNOSIS — J84.9 ILD (INTERSTITIAL LUNG DISEASE) (HCC): ICD-10-CM

## 2024-07-25 DIAGNOSIS — M85.89 DISAPPEARING BONE DISEASE: ICD-10-CM

## 2024-07-25 DIAGNOSIS — M81.0 SENILE OSTEOPOROSIS: ICD-10-CM

## 2024-07-25 PROCEDURE — 71250 CT THORAX DX C-: CPT

## 2024-07-25 PROCEDURE — 77080 DXA BONE DENSITY AXIAL: CPT

## 2024-09-13 ENCOUNTER — HOSPITAL ENCOUNTER (OUTPATIENT)
Dept: LAB | Facility: MEDICAL CENTER | Age: 74
End: 2024-09-13
Attending: INTERNAL MEDICINE
Payer: MEDICARE

## 2024-09-13 LAB
BASOPHILS # BLD AUTO: 0.7 % (ref 0–1.8)
BASOPHILS # BLD: 0.07 K/UL (ref 0–0.12)
EOSINOPHIL # BLD AUTO: 0.18 K/UL (ref 0–0.51)
EOSINOPHIL NFR BLD: 1.9 % (ref 0–6.9)
ERYTHROCYTE [DISTWIDTH] IN BLOOD BY AUTOMATED COUNT: 43.3 FL (ref 35.9–50)
ERYTHROCYTE [SEDIMENTATION RATE] IN BLOOD BY WESTERGREN METHOD: 12 MM/HOUR (ref 0–25)
HCT VFR BLD AUTO: 44.7 % (ref 37–47)
HGB BLD-MCNC: 14.9 G/DL (ref 12–16)
IMM GRANULOCYTES # BLD AUTO: 0.04 K/UL (ref 0–0.11)
IMM GRANULOCYTES NFR BLD AUTO: 0.4 % (ref 0–0.9)
LYMPHOCYTES # BLD AUTO: 1.93 K/UL (ref 1–4.8)
LYMPHOCYTES NFR BLD: 20.5 % (ref 22–41)
MCH RBC QN AUTO: 30.2 PG (ref 27–33)
MCHC RBC AUTO-ENTMCNC: 33.3 G/DL (ref 32.2–35.5)
MCV RBC AUTO: 90.5 FL (ref 81.4–97.8)
MONOCYTES # BLD AUTO: 0.96 K/UL (ref 0–0.85)
MONOCYTES NFR BLD AUTO: 10.2 % (ref 0–13.4)
NEUTROPHILS # BLD AUTO: 6.25 K/UL (ref 1.82–7.42)
NEUTROPHILS NFR BLD: 66.3 % (ref 44–72)
NRBC # BLD AUTO: 0 K/UL
NRBC BLD-RTO: 0 /100 WBC (ref 0–0.2)
PLATELET # BLD AUTO: 276 K/UL (ref 164–446)
PMV BLD AUTO: 10.7 FL (ref 9–12.9)
RBC # BLD AUTO: 4.94 M/UL (ref 4.2–5.4)
WBC # BLD AUTO: 9.4 K/UL (ref 4.8–10.8)

## 2024-09-13 PROCEDURE — 80053 COMPREHEN METABOLIC PANEL: CPT

## 2024-09-13 PROCEDURE — 85652 RBC SED RATE AUTOMATED: CPT

## 2024-09-13 PROCEDURE — 82550 ASSAY OF CK (CPK): CPT

## 2024-09-13 PROCEDURE — 36415 COLL VENOUS BLD VENIPUNCTURE: CPT

## 2024-09-13 PROCEDURE — 86140 C-REACTIVE PROTEIN: CPT

## 2024-09-13 PROCEDURE — 85025 COMPLETE CBC W/AUTO DIFF WBC: CPT

## 2024-09-13 PROCEDURE — 80061 LIPID PANEL: CPT

## 2024-09-14 LAB
ALBUMIN SERPL BCP-MCNC: 4.5 G/DL (ref 3.2–4.9)
ALBUMIN/GLOB SERPL: 1.2 G/DL
ALP SERPL-CCNC: 79 U/L (ref 30–99)
ALT SERPL-CCNC: 23 U/L (ref 2–50)
ANION GAP SERPL CALC-SCNC: 13 MMOL/L (ref 7–16)
AST SERPL-CCNC: 18 U/L (ref 12–45)
BILIRUB SERPL-MCNC: 0.3 MG/DL (ref 0.1–1.5)
BUN SERPL-MCNC: 19 MG/DL (ref 8–22)
CALCIUM ALBUM COR SERPL-MCNC: 9.4 MG/DL (ref 8.5–10.5)
CALCIUM SERPL-MCNC: 9.8 MG/DL (ref 8.5–10.5)
CHLORIDE SERPL-SCNC: 104 MMOL/L (ref 96–112)
CHOLEST SERPL-MCNC: 120 MG/DL (ref 100–199)
CK SERPL-CCNC: 100 U/L (ref 0–154)
CO2 SERPL-SCNC: 25 MMOL/L (ref 20–33)
CREAT SERPL-MCNC: 0.97 MG/DL (ref 0.5–1.4)
CRP SERPL HS-MCNC: 0.7 MG/DL (ref 0–0.75)
FASTING STATUS PATIENT QL REPORTED: NORMAL
GFR SERPLBLD CREATININE-BSD FMLA CKD-EPI: 62 ML/MIN/1.73 M 2
GLOBULIN SER CALC-MCNC: 3.8 G/DL (ref 1.9–3.5)
GLUCOSE SERPL-MCNC: 101 MG/DL (ref 65–99)
HDLC SERPL-MCNC: 43 MG/DL
LDLC SERPL CALC-MCNC: 47 MG/DL
POTASSIUM SERPL-SCNC: 4.9 MMOL/L (ref 3.6–5.5)
PROT SERPL-MCNC: 8.3 G/DL (ref 6–8.2)
SODIUM SERPL-SCNC: 142 MMOL/L (ref 135–145)
TRIGL SERPL-MCNC: 149 MG/DL (ref 0–149)

## 2024-09-30 DIAGNOSIS — M81.0 AGE-RELATED OSTEOPOROSIS WITHOUT CURRENT PATHOLOGICAL FRACTURE: ICD-10-CM

## 2024-09-30 RX ORDER — 0.9 % SODIUM CHLORIDE 0.9 %
VIAL (ML) INJECTION PRN
OUTPATIENT
Start: 2024-10-01

## 2024-09-30 RX ORDER — 0.9 % SODIUM CHLORIDE 0.9 %
10 VIAL (ML) INJECTION PRN
OUTPATIENT
Start: 2024-10-01

## 2024-09-30 RX ORDER — 0.9 % SODIUM CHLORIDE 0.9 %
3 VIAL (ML) INJECTION PRN
OUTPATIENT
Start: 2024-10-01

## 2024-09-30 RX ORDER — ZOLEDRONIC ACID 0.05 MG/ML
5 INJECTION, SOLUTION INTRAVENOUS ONCE
OUTPATIENT
Start: 2024-10-01 | End: 2024-10-01

## 2024-09-30 RX ORDER — SODIUM CHLORIDE 9 MG/ML
INJECTION, SOLUTION INTRAVENOUS CONTINUOUS
OUTPATIENT
Start: 2024-10-01

## 2024-10-24 ENCOUNTER — OUTPATIENT INFUSION SERVICES (OUTPATIENT)
Dept: ONCOLOGY | Facility: MEDICAL CENTER | Age: 74
End: 2024-10-24
Attending: NURSE PRACTITIONER
Payer: MEDICARE

## 2024-10-24 VITALS
DIASTOLIC BLOOD PRESSURE: 58 MMHG | HEIGHT: 64 IN | WEIGHT: 179.23 LBS | HEART RATE: 66 BPM | SYSTOLIC BLOOD PRESSURE: 148 MMHG | OXYGEN SATURATION: 97 % | RESPIRATION RATE: 19 BRPM | BODY MASS INDEX: 30.6 KG/M2 | TEMPERATURE: 96.9 F

## 2024-10-24 DIAGNOSIS — M81.0 AGE-RELATED OSTEOPOROSIS WITHOUT CURRENT PATHOLOGICAL FRACTURE: ICD-10-CM

## 2024-10-24 LAB
CA-I BLD ISE-SCNC: 1.1 MMOL/L (ref 1.1–1.3)
CREAT BLD-MCNC: 1.5 MG/DL (ref 0.5–1.4)

## 2024-10-24 PROCEDURE — 700111 HCHG RX REV CODE 636 W/ 250 OVERRIDE (IP): Mod: JZ | Performed by: NURSE PRACTITIONER

## 2024-10-24 PROCEDURE — 82330 ASSAY OF CALCIUM: CPT

## 2024-10-24 PROCEDURE — 82565 ASSAY OF CREATININE: CPT

## 2024-10-24 PROCEDURE — 96365 THER/PROPH/DIAG IV INF INIT: CPT

## 2024-10-24 RX ORDER — ACETAMINOPHEN 325 MG/1
650 TABLET ORAL ONCE
OUTPATIENT
Start: 2024-10-24

## 2024-10-24 RX ORDER — 0.9 % SODIUM CHLORIDE 0.9 %
10 VIAL (ML) INJECTION PRN
OUTPATIENT
Start: 2025-10-25

## 2024-10-24 RX ORDER — METHYLPREDNISOLONE SODIUM SUCCINATE 125 MG/2ML
80 INJECTION, POWDER, LYOPHILIZED, FOR SOLUTION INTRAMUSCULAR; INTRAVENOUS ONCE
OUTPATIENT
Start: 2024-10-24 | End: 2024-10-24

## 2024-10-24 RX ORDER — 0.9 % SODIUM CHLORIDE 0.9 %
3 VIAL (ML) INJECTION PRN
OUTPATIENT
Start: 2025-10-25

## 2024-10-24 RX ORDER — ZOLEDRONIC ACID 0.05 MG/ML
5 INJECTION, SOLUTION INTRAVENOUS ONCE
Status: COMPLETED | OUTPATIENT
Start: 2024-10-24 | End: 2024-10-24

## 2024-10-24 RX ORDER — 0.9 % SODIUM CHLORIDE 0.9 %
VIAL (ML) INJECTION PRN
OUTPATIENT
Start: 2025-10-25

## 2024-10-24 RX ORDER — ZOLEDRONIC ACID 0.05 MG/ML
5 INJECTION, SOLUTION INTRAVENOUS ONCE
OUTPATIENT
Start: 2025-10-25 | End: 2025-10-25

## 2024-10-24 RX ORDER — SODIUM CHLORIDE 9 MG/ML
INJECTION, SOLUTION INTRAVENOUS CONTINUOUS
OUTPATIENT
Start: 2025-10-25

## 2024-10-24 RX ADMIN — ZOLEDRONIC ACID 5 MG: 5 INJECTION, SOLUTION INTRAVENOUS at 14:31

## 2024-10-24 ASSESSMENT — FIBROSIS 4 INDEX: FIB4 SCORE: 0.98

## 2024-10-31 ENCOUNTER — APPOINTMENT (RX ONLY)
Dept: URBAN - METROPOLITAN AREA CLINIC 4 | Facility: CLINIC | Age: 74
Setting detail: DERMATOLOGY
End: 2024-10-31

## 2024-10-31 DIAGNOSIS — D485 NEOPLASM OF UNCERTAIN BEHAVIOR OF SKIN: ICD-10-CM

## 2024-10-31 DIAGNOSIS — L81.4 OTHER MELANIN HYPERPIGMENTATION: ICD-10-CM

## 2024-10-31 DIAGNOSIS — D22 MELANOCYTIC NEVI: ICD-10-CM

## 2024-10-31 PROBLEM — D22.39 MELANOCYTIC NEVI OF OTHER PARTS OF FACE: Status: ACTIVE | Noted: 2024-10-31

## 2024-10-31 PROBLEM — D48.5 NEOPLASM OF UNCERTAIN BEHAVIOR OF SKIN: Status: ACTIVE | Noted: 2024-10-31

## 2024-10-31 PROCEDURE — ? BIOPSY BY SHAVE METHOD

## 2024-10-31 PROCEDURE — 99203 OFFICE O/P NEW LOW 30 MIN: CPT | Mod: 25

## 2024-10-31 PROCEDURE — 11103 TANGNTL BX SKIN EA SEP/ADDL: CPT

## 2024-10-31 PROCEDURE — 11102 TANGNTL BX SKIN SINGLE LES: CPT

## 2024-10-31 PROCEDURE — ? COUNSELING

## 2024-10-31 ASSESSMENT — LOCATION DETAILED DESCRIPTION DERM
LOCATION DETAILED: RIGHT PROXIMAL PRETIBIAL REGION
LOCATION DETAILED: LEFT SUPERIOR CENTRAL MALAR CHEEK
LOCATION DETAILED: RIGHT PROXIMAL PRETIBIAL REGION
LOCATION DETAILED: LEFT SUPERIOR LATERAL MALAR CHEEK
LOCATION DETAILED: LEFT CENTRAL ZYGOMA
LOCATION DETAILED: LEFT SUPERIOR CENTRAL MALAR CHEEK

## 2024-10-31 ASSESSMENT — LOCATION SIMPLE DESCRIPTION DERM
LOCATION SIMPLE: LEFT CHEEK
LOCATION SIMPLE: RIGHT PRETIBIAL REGION
LOCATION SIMPLE: RIGHT PRETIBIAL REGION
LOCATION SIMPLE: LEFT CHEEK
LOCATION SIMPLE: LEFT ZYGOMA

## 2024-10-31 ASSESSMENT — LOCATION ZONE DERM
LOCATION ZONE: LEG
LOCATION ZONE: FACE
LOCATION ZONE: LEG
LOCATION ZONE: FACE

## 2024-11-30 ENCOUNTER — APPOINTMENT (OUTPATIENT)
Dept: RADIOLOGY | Facility: MEDICAL CENTER | Age: 74
End: 2024-11-30
Attending: INTERNAL MEDICINE
Payer: MEDICARE

## 2024-11-30 DIAGNOSIS — Z12.31 VISIT FOR SCREENING MAMMOGRAM: ICD-10-CM

## 2024-12-05 ENCOUNTER — HOSPITAL ENCOUNTER (OUTPATIENT)
Dept: RADIOLOGY | Facility: MEDICAL CENTER | Age: 74
End: 2024-12-05
Attending: INTERNAL MEDICINE
Payer: MEDICARE

## 2024-12-05 ENCOUNTER — APPOINTMENT (OUTPATIENT)
Dept: URBAN - METROPOLITAN AREA CLINIC 4 | Facility: CLINIC | Age: 74
Setting detail: DERMATOLOGY
End: 2024-12-05

## 2024-12-05 DIAGNOSIS — B07.8 OTHER VIRAL WARTS: ICD-10-CM

## 2024-12-05 DIAGNOSIS — Z12.31 VISIT FOR SCREENING MAMMOGRAM: ICD-10-CM

## 2024-12-05 DIAGNOSIS — L91.0 HYPERTROPHIC SCAR: ICD-10-CM

## 2024-12-05 PROCEDURE — 99212 OFFICE O/P EST SF 10 MIN: CPT | Mod: 25

## 2024-12-05 PROCEDURE — 17110 DESTRUCTION B9 LES UP TO 14: CPT

## 2024-12-05 PROCEDURE — ? LIQUID NITROGEN

## 2024-12-05 PROCEDURE — ? PHOTO-DOCUMENTATION

## 2024-12-05 PROCEDURE — 77067 SCR MAMMO BI INCL CAD: CPT

## 2024-12-05 PROCEDURE — ? ADDITIONAL NOTES

## 2024-12-05 PROCEDURE — ? COUNSELING

## 2024-12-05 ASSESSMENT — LOCATION DETAILED DESCRIPTION DERM: LOCATION DETAILED: RIGHT DISTAL ULNAR DORSAL SMALL FINGER

## 2024-12-05 ASSESSMENT — LOCATION ZONE DERM: LOCATION ZONE: FINGER

## 2024-12-05 ASSESSMENT — LOCATION SIMPLE DESCRIPTION DERM: LOCATION SIMPLE: RIGHT SMALL FINGER

## 2024-12-05 NOTE — PROCEDURE: LIQUID NITROGEN
Show Spray Paint Technique Variable?: Yes
Spray Paint Text: The liquid nitrogen was applied to the skin utilizing a spray paint frosting technique.
Medical Necessity Information: It is in your best interest to select a reason for this procedure from the list below. All of these items fulfill various CMS LCD requirements except the new and changing color options.
Add 52 Modifier (Optional): no
Consent: The patient's consent was obtained including but not limited to risks of crusting, scabbing, blistering, scarring, darker or lighter pigmentary change, recurrence, incomplete removal and infection.
Detail Level: Detailed
Post-Care Instructions: I reviewed with the patient in detail post-care instructions. Patient is to wear sunprotection, and avoid picking at any of the treated lesions. Pt may apply Vaseline to crusted or scabbing areas.
Medical Necessity Clause: This procedure was medically necessary because the lesions that were treated were:

## 2024-12-16 ENCOUNTER — HOSPITAL ENCOUNTER (OUTPATIENT)
Dept: LAB | Facility: MEDICAL CENTER | Age: 74
End: 2024-12-16
Attending: NURSE PRACTITIONER
Payer: MEDICARE

## 2024-12-16 LAB
25(OH)D3 SERPL-MCNC: 52 NG/ML (ref 30–100)
ALBUMIN SERPL BCP-MCNC: 4.3 G/DL (ref 3.2–4.9)
ALBUMIN/GLOB SERPL: 1.3 G/DL
ALP SERPL-CCNC: 59 U/L (ref 30–99)
ALT SERPL-CCNC: 30 U/L (ref 2–50)
ANION GAP SERPL CALC-SCNC: 10 MMOL/L (ref 7–16)
AST SERPL-CCNC: 26 U/L (ref 12–45)
BASOPHILS # BLD AUTO: 0.9 % (ref 0–1.8)
BASOPHILS # BLD: 0.06 K/UL (ref 0–0.12)
BILIRUB SERPL-MCNC: 0.3 MG/DL (ref 0.1–1.5)
BUN SERPL-MCNC: 19 MG/DL (ref 8–22)
CALCIUM ALBUM COR SERPL-MCNC: 8.7 MG/DL (ref 8.5–10.5)
CALCIUM SERPL-MCNC: 8.9 MG/DL (ref 8.5–10.5)
CHLORIDE SERPL-SCNC: 105 MMOL/L (ref 96–112)
CK SERPL-CCNC: 82 U/L (ref 0–154)
CO2 SERPL-SCNC: 26 MMOL/L (ref 20–33)
CREAT SERPL-MCNC: 0.77 MG/DL (ref 0.5–1.4)
CRP SERPL HS-MCNC: 0.69 MG/DL (ref 0–0.75)
EOSINOPHIL # BLD AUTO: 0.16 K/UL (ref 0–0.51)
EOSINOPHIL NFR BLD: 2.3 % (ref 0–6.9)
ERYTHROCYTE [DISTWIDTH] IN BLOOD BY AUTOMATED COUNT: 44.6 FL (ref 35.9–50)
ERYTHROCYTE [SEDIMENTATION RATE] IN BLOOD BY WESTERGREN METHOD: 16 MM/HOUR (ref 0–25)
GFR SERPLBLD CREATININE-BSD FMLA CKD-EPI: 82 ML/MIN/1.73 M 2
GLOBULIN SER CALC-MCNC: 3.2 G/DL (ref 1.9–3.5)
GLUCOSE SERPL-MCNC: 101 MG/DL (ref 65–99)
HCT VFR BLD AUTO: 41.8 % (ref 37–47)
HGB BLD-MCNC: 14.1 G/DL (ref 12–16)
IMM GRANULOCYTES # BLD AUTO: 0.02 K/UL (ref 0–0.11)
IMM GRANULOCYTES NFR BLD AUTO: 0.3 % (ref 0–0.9)
LYMPHOCYTES # BLD AUTO: 1.51 K/UL (ref 1–4.8)
LYMPHOCYTES NFR BLD: 22 % (ref 22–41)
MCH RBC QN AUTO: 30.7 PG (ref 27–33)
MCHC RBC AUTO-ENTMCNC: 33.7 G/DL (ref 32.2–35.5)
MCV RBC AUTO: 90.9 FL (ref 81.4–97.8)
MONOCYTES # BLD AUTO: 0.67 K/UL (ref 0–0.85)
MONOCYTES NFR BLD AUTO: 9.8 % (ref 0–13.4)
NEUTROPHILS # BLD AUTO: 4.43 K/UL (ref 1.82–7.42)
NEUTROPHILS NFR BLD: 64.7 % (ref 44–72)
NRBC # BLD AUTO: 0 K/UL
NRBC BLD-RTO: 0 /100 WBC (ref 0–0.2)
PLATELET # BLD AUTO: 232 K/UL (ref 164–446)
PMV BLD AUTO: 10.2 FL (ref 9–12.9)
POTASSIUM SERPL-SCNC: 4.3 MMOL/L (ref 3.6–5.5)
PROT SERPL-MCNC: 7.5 G/DL (ref 6–8.2)
RBC # BLD AUTO: 4.6 M/UL (ref 4.2–5.4)
SODIUM SERPL-SCNC: 141 MMOL/L (ref 135–145)
WBC # BLD AUTO: 6.9 K/UL (ref 4.8–10.8)

## 2024-12-16 PROCEDURE — 80053 COMPREHEN METABOLIC PANEL: CPT

## 2024-12-16 PROCEDURE — 86140 C-REACTIVE PROTEIN: CPT

## 2024-12-16 PROCEDURE — 82306 VITAMIN D 25 HYDROXY: CPT

## 2024-12-16 PROCEDURE — 85025 COMPLETE CBC W/AUTO DIFF WBC: CPT

## 2024-12-16 PROCEDURE — 85652 RBC SED RATE AUTOMATED: CPT

## 2024-12-16 PROCEDURE — 82550 ASSAY OF CK (CPK): CPT

## 2024-12-16 PROCEDURE — 36415 COLL VENOUS BLD VENIPUNCTURE: CPT

## 2024-12-24 RX ORDER — METHYLPREDNISOLONE SODIUM SUCCINATE 125 MG/2ML
80 INJECTION, POWDER, LYOPHILIZED, FOR SOLUTION INTRAMUSCULAR; INTRAVENOUS ONCE
OUTPATIENT
Start: 2024-12-24 | End: 2024-12-24

## 2024-12-24 RX ORDER — ACETAMINOPHEN 325 MG/1
650 TABLET ORAL ONCE
OUTPATIENT
Start: 2024-12-24

## 2025-01-23 ENCOUNTER — HOSPITAL ENCOUNTER (OUTPATIENT)
Dept: LAB | Facility: MEDICAL CENTER | Age: 75
End: 2025-01-23
Attending: INTERNAL MEDICINE
Payer: MEDICARE

## 2025-01-23 LAB
ALBUMIN SERPL BCP-MCNC: 4.4 G/DL (ref 3.2–4.9)
ALBUMIN/GLOB SERPL: 1.4 G/DL
ALP SERPL-CCNC: 71 U/L (ref 30–99)
ALT SERPL-CCNC: 36 U/L (ref 2–50)
ANION GAP SERPL CALC-SCNC: 11 MMOL/L (ref 7–16)
AST SERPL-CCNC: 22 U/L (ref 12–45)
BILIRUB SERPL-MCNC: 0.3 MG/DL (ref 0.1–1.5)
BUN SERPL-MCNC: 16 MG/DL (ref 8–22)
CALCIUM ALBUM COR SERPL-MCNC: 9.1 MG/DL (ref 8.5–10.5)
CALCIUM SERPL-MCNC: 9.4 MG/DL (ref 8.5–10.5)
CHLORIDE SERPL-SCNC: 107 MMOL/L (ref 96–112)
CHOLEST SERPL-MCNC: 242 MG/DL (ref 100–199)
CO2 SERPL-SCNC: 25 MMOL/L (ref 20–33)
CREAT SERPL-MCNC: 0.84 MG/DL (ref 0.5–1.4)
FASTING STATUS PATIENT QL REPORTED: NORMAL
GFR SERPLBLD CREATININE-BSD FMLA CKD-EPI: 74 ML/MIN/1.73 M 2
GLOBULIN SER CALC-MCNC: 3.1 G/DL (ref 1.9–3.5)
GLUCOSE SERPL-MCNC: 95 MG/DL (ref 65–99)
HDLC SERPL-MCNC: 48 MG/DL
LDLC SERPL CALC-MCNC: 169 MG/DL
POTASSIUM SERPL-SCNC: 4.5 MMOL/L (ref 3.6–5.5)
PROT SERPL-MCNC: 7.5 G/DL (ref 6–8.2)
SODIUM SERPL-SCNC: 143 MMOL/L (ref 135–145)
TRIGL SERPL-MCNC: 127 MG/DL (ref 0–149)

## 2025-01-23 PROCEDURE — 80061 LIPID PANEL: CPT

## 2025-01-23 PROCEDURE — 36415 COLL VENOUS BLD VENIPUNCTURE: CPT

## 2025-01-23 PROCEDURE — 80053 COMPREHEN METABOLIC PANEL: CPT

## 2025-04-24 ENCOUNTER — HOSPITAL ENCOUNTER (OUTPATIENT)
Dept: LAB | Facility: MEDICAL CENTER | Age: 75
End: 2025-04-24
Attending: INTERNAL MEDICINE
Payer: MEDICARE

## 2025-04-24 LAB
EST. AVERAGE GLUCOSE BLD GHB EST-MCNC: 137 MG/DL
HBA1C MFR BLD: 6.4 % (ref 4–5.6)

## 2025-04-24 PROCEDURE — 80053 COMPREHEN METABOLIC PANEL: CPT

## 2025-04-24 PROCEDURE — 80061 LIPID PANEL: CPT

## 2025-04-24 PROCEDURE — 83036 HEMOGLOBIN GLYCOSYLATED A1C: CPT | Mod: GA

## 2025-04-25 LAB
ALBUMIN SERPL BCP-MCNC: 4.2 G/DL (ref 3.2–4.9)
ALBUMIN/GLOB SERPL: 1.2 G/DL
ALP SERPL-CCNC: 84 U/L (ref 30–99)
ALT SERPL-CCNC: 36 U/L (ref 2–50)
ANION GAP SERPL CALC-SCNC: 13 MMOL/L (ref 7–16)
AST SERPL-CCNC: 33 U/L (ref 12–45)
BILIRUB SERPL-MCNC: 0.4 MG/DL (ref 0.1–1.5)
BUN SERPL-MCNC: 19 MG/DL (ref 8–22)
CALCIUM ALBUM COR SERPL-MCNC: 9.4 MG/DL (ref 8.5–10.5)
CALCIUM SERPL-MCNC: 9.6 MG/DL (ref 8.5–10.5)
CHLORIDE SERPL-SCNC: 95 MMOL/L (ref 96–112)
CHOLEST SERPL-MCNC: 164 MG/DL (ref 100–199)
CO2 SERPL-SCNC: 23 MMOL/L (ref 20–33)
CREAT SERPL-MCNC: 1.18 MG/DL (ref 0.5–1.4)
FASTING STATUS PATIENT QL REPORTED: NORMAL
GFR SERPLBLD CREATININE-BSD FMLA CKD-EPI: 49 ML/MIN/1.73 M 2
GLOBULIN SER CALC-MCNC: 3.4 G/DL (ref 1.9–3.5)
GLUCOSE SERPL-MCNC: 91 MG/DL (ref 65–99)
HDLC SERPL-MCNC: 46 MG/DL
LDLC SERPL CALC-MCNC: 95 MG/DL
POTASSIUM SERPL-SCNC: 4.9 MMOL/L (ref 3.6–5.5)
PROT SERPL-MCNC: 7.6 G/DL (ref 6–8.2)
SODIUM SERPL-SCNC: 131 MMOL/L (ref 135–145)
TRIGL SERPL-MCNC: 115 MG/DL (ref 0–149)

## 2025-06-23 NOTE — PROGRESS NOTES
CC: Skin lesions and follow-up on anxiety    HISTORY OF THE PRESENT ILLNESS: Patient is a 67 y.o. female. This pleasant patient is here today for management of anxiety and skin lesions.  Patient was to have lab work done prior to appointment so that we could review results.  Patient was unable to get lab work done until this morning.  As I do not have results yet, I will notify patient of results via letter or phone call.        Essential hypertension  This is a chronic problem for patient that is well controlled with bisoprolol-hydrochlorothiazide 10-6.25 one and 1/2 tabs daily.  She increased dose after last appointment and blood pressure reading is improved today.  BP today is 128/82.  The patient denies chest pain, shortness of breath or dyspnea on exertion.  Tolerating medication, denies lightheadedness or cough.    Chronic pain of right knee  Patient reports chronic right knee pain.  Will flare up every once in a while.  Applies diclofenac gel occasionally if needed.  Last refilled gel script 4 years ago.  Asking for refill today.    Seborrheic keratoses  Patient reports lesions on her back that are brown and raised. They are getting irritated because under bra strap and are pruritic. She reports similar lesions in the past that she had frozen off about 10 years ago.  Patient denies bleeding, erythema, swelling, fever.  She is requesting cryotherapy today.  Upon exam, she has six lesions on her back consistent with seborrheic keratoses.  Will proceed with cryotherapy.      Anxiety  Patient started escitalopram 10 mg 8 weeks ago for anxiety.  She reports that she has noted a mild difference, uncertain if it is from the medication.  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  Patient denies any side effects.  We discussed increasing dose to 20 mg.  Patient is agreeable to dose increase.  Will prescribe escitalopram 20 mg daily.      Allergies: Lisinopril    Current Outpatient  "Prescriptions Ordered in Epic   Medication Sig Dispense Refill   • Diclofenac Sodium (VOLTAREN) 1 % Gel Apply 2 g to skin as directed 4 times a day. 100 g 0   • escitalopram (LEXAPRO) 20 MG tablet Take 0.5 Tabs by mouth every day. 90 Tab 1   • bisoprolol-hydrochlorothiazide (ZIAC) 10-6.25 MG per tablet Take 1 and 1/2 tab daily 135 Tab 0   • simvastatin (ZOCOR) 80 MG tablet Take 1 Tab by mouth every day. 90 Tab 0   • aspirin (ASA) 325 MG TABS Take 325 mg by mouth every 6 hours as needed.     • albuterol 108 (90 Base) MCG/ACT Aero Soln inhalation aerosol Inhale 2 Puffs by mouth every four hours as needed for Shortness of Breath. 1 Inhaler 0     No current Epic-ordered facility-administered medications on file.        Past Medical History:   Diagnosis Date   • Heart attack (HCC)     2009        Past Surgical History:   Procedure Laterality Date   • CYST EXCISION      right ankle, RA nodule    • OPEN REDUCTION     • STENT PLACEMENT      2009   • TUBAL COAGULATION LAPAROSCOPIC BILATERAL         Social History   Substance Use Topics   • Smoking status: Current Some Day Smoker     Packs/day: 0.50     Types: Cigarettes   • Smokeless tobacco: Never Used   • Alcohol use Yes      Comment: rare       No family history on file.    ROS:      Negative chest pain, abdominal pain, dyspnea       Exam: Blood pressure 128/82, pulse 71, temperature 36.4 °C (97.6 °F), resp. rate 16, height 1.626 m (5' 4\"), weight 79.1 kg (174 lb 6.4 oz), SpO2 96 %. Body mass index is 29.94 kg/m².    General: Alert, pleasant, well nourished, well developed female in NAD  Pulmonary: Clear to ausculation.  Normal effort. No rales, ronchi, or wheezing.  Cardiovascular: Normal rate and rhythm without murmur.  No lower extremity edema.  Neurologic: Grossly nonfocal  Skin: Back with 6 lesions brown, waxy, stuck on appearance, varying in size from 0.25 cm to 0.75 cm, no erythema or bleeding.  Musculoskeletal: Normal gait.   Psych: Normal mood and affect. Alert " and oriented. Judgment and insight is normal.    Please note that this dictation was created using voice recognition software. I have made every reasonable attempt to correct obvious errors, but I expect that there are errors of grammar and possibly content that I did not discover before finalizing the note.      Assessment/Plan  1. Need for Tdap vaccination  Given.  - TDAP VACCINE =>6YO IM    2. Chronic right knee pain    - Diclofenac Sodium (VOLTAREN) 1 % Gel; Apply 2 g to skin as directed 4 times a day.  Dispense: 100 g; Refill: 0    3. Anxiety  Increased dose of medication.  - escitalopram (LEXAPRO) 20 MG tablet; Take 0.5 Tabs by mouth every day.  Dispense: 90 Tab; Refill: 1    4. Essential hypertension  Continue with bisopropol-hydrochlorothiazide.  - bisoprolol-hydrochlorothiazide (ZIAC) 10-6.25 MG per tablet; Take 1 and 1/2 tab daily  Dispense: 135 Tab; Refill: 0    5. Seborrheic keratoses  Risks and benefits for cryotherapy reviewed.  Aftercare reviewed with patient.  Informed that lesions would blister and to not pick at them.  When blisters drain, instructed patient to apply petroleum jelly to lesions as they heal.  Reviewed signs and symptoms of infection.  Patient will return to clinic if show signs of infection.  Written and verbal consent was obtained.    - Consent for Surgery / Procedure  Cryotherapy performed in 3 short bursts to 6 lesions on back.  Patient tolerated well.    Patient will return to clinic in 6 months for anxiety and hypertension.  Patient will return to clinic sooner if needed.     Mayo Simmons(PA)

## 2025-06-24 ENCOUNTER — HOSPITAL ENCOUNTER (OUTPATIENT)
Dept: LAB | Facility: MEDICAL CENTER | Age: 75
End: 2025-06-24
Attending: INTERNAL MEDICINE
Payer: MEDICARE

## 2025-06-24 LAB
ALBUMIN SERPL BCP-MCNC: 4.1 G/DL (ref 3.2–4.9)
ALBUMIN/GLOB SERPL: 1.2 G/DL
ALP SERPL-CCNC: 67 U/L (ref 30–99)
ALT SERPL-CCNC: 23 U/L (ref 2–50)
ANION GAP SERPL CALC-SCNC: 11 MMOL/L (ref 7–16)
AST SERPL-CCNC: 26 U/L (ref 12–45)
BASOPHILS # BLD AUTO: 0.6 % (ref 0–1.8)
BASOPHILS # BLD: 0.05 K/UL (ref 0–0.12)
BILIRUB SERPL-MCNC: 0.3 MG/DL (ref 0.1–1.5)
BUN SERPL-MCNC: 19 MG/DL (ref 8–22)
CALCIUM ALBUM COR SERPL-MCNC: 8.9 MG/DL (ref 8.5–10.5)
CALCIUM SERPL-MCNC: 9 MG/DL (ref 8.5–10.5)
CHLORIDE SERPL-SCNC: 102 MMOL/L (ref 96–112)
CK SERPL-CCNC: 151 U/L (ref 0–154)
CO2 SERPL-SCNC: 24 MMOL/L (ref 20–33)
CREAT SERPL-MCNC: 1.26 MG/DL (ref 0.5–1.4)
CRP SERPL HS-MCNC: 1.25 MG/DL (ref 0–0.75)
EOSINOPHIL # BLD AUTO: 0.15 K/UL (ref 0–0.51)
EOSINOPHIL NFR BLD: 1.8 % (ref 0–6.9)
ERYTHROCYTE [DISTWIDTH] IN BLOOD BY AUTOMATED COUNT: 47.3 FL (ref 35.9–50)
ERYTHROCYTE [SEDIMENTATION RATE] IN BLOOD BY WESTERGREN METHOD: 37 MM/HOUR (ref 0–25)
GFR SERPLBLD CREATININE-BSD FMLA CKD-EPI: 45 ML/MIN/1.73 M 2
GLOBULIN SER CALC-MCNC: 3.3 G/DL (ref 1.9–3.5)
GLUCOSE SERPL-MCNC: 107 MG/DL (ref 65–99)
HCT VFR BLD AUTO: 38.3 % (ref 37–47)
HGB BLD-MCNC: 12.6 G/DL (ref 12–16)
IMM GRANULOCYTES # BLD AUTO: 0.03 K/UL (ref 0–0.11)
IMM GRANULOCYTES NFR BLD AUTO: 0.4 % (ref 0–0.9)
LYMPHOCYTES # BLD AUTO: 1.42 K/UL (ref 1–4.8)
LYMPHOCYTES NFR BLD: 16.9 % (ref 22–41)
MCH RBC QN AUTO: 29.5 PG (ref 27–33)
MCHC RBC AUTO-ENTMCNC: 32.9 G/DL (ref 32.2–35.5)
MCV RBC AUTO: 89.7 FL (ref 81.4–97.8)
MONOCYTES # BLD AUTO: 0.76 K/UL (ref 0–0.85)
MONOCYTES NFR BLD AUTO: 9.1 % (ref 0–13.4)
NEUTROPHILS # BLD AUTO: 5.97 K/UL (ref 1.82–7.42)
NEUTROPHILS NFR BLD: 71.2 % (ref 44–72)
NRBC # BLD AUTO: 0 K/UL
NRBC BLD-RTO: 0 /100 WBC (ref 0–0.2)
PLATELET # BLD AUTO: 242 K/UL (ref 164–446)
PMV BLD AUTO: 10.4 FL (ref 9–12.9)
POTASSIUM SERPL-SCNC: 4.4 MMOL/L (ref 3.6–5.5)
PROT SERPL-MCNC: 7.4 G/DL (ref 6–8.2)
RBC # BLD AUTO: 4.27 M/UL (ref 4.2–5.4)
SODIUM SERPL-SCNC: 137 MMOL/L (ref 135–145)
WBC # BLD AUTO: 8.4 K/UL (ref 4.8–10.8)

## 2025-06-24 PROCEDURE — 86480 TB TEST CELL IMMUN MEASURE: CPT

## 2025-06-24 PROCEDURE — 85652 RBC SED RATE AUTOMATED: CPT

## 2025-06-24 PROCEDURE — 36415 COLL VENOUS BLD VENIPUNCTURE: CPT

## 2025-06-24 PROCEDURE — 80053 COMPREHEN METABOLIC PANEL: CPT

## 2025-06-24 PROCEDURE — 82550 ASSAY OF CK (CPK): CPT

## 2025-06-24 PROCEDURE — 86140 C-REACTIVE PROTEIN: CPT

## 2025-06-24 PROCEDURE — 85025 COMPLETE CBC W/AUTO DIFF WBC: CPT

## 2025-06-25 LAB
GAMMA INTERFERON BACKGROUND BLD IA-ACNC: 0.02 IU/ML
M TB IFN-G BLD-IMP: NEGATIVE
M TB IFN-G CD4+ BCKGRND COR BLD-ACNC: 0 IU/ML
MITOGEN IGNF BCKGRD COR BLD-ACNC: 4.96 IU/ML
QFT TB2 - NIL TBQ2: 0 IU/ML

## (undated) DEVICE — LACTATED RINGERS INJ 1000 ML - (14EA/CA 60CA/PF)

## (undated) DEVICE — VESSELOOP MINI BLUE STERILE - SURG-I-LOOP (10EA/BX)

## (undated) DEVICE — SPONGE GAUZESTER 4 X 4 4PLY - (128PK/CA)

## (undated) DEVICE — SUTURE 3-0 VICRYL PLUS SH - 27 INCH (36/BX)

## (undated) DEVICE — SET EXTENSION WITH 2 PORTS (48EA/CA) ***PART #2C8610 IS A SUBSTITUTE*****

## (undated) DEVICE — SUCTION INSTRUMENT YANKAUER BULBOUS TIP W/O VENT (50EA/CA)

## (undated) DEVICE — SUTURE 6-0 PROLENE BV-1 D/A 24 (36PK/BX)"

## (undated) DEVICE — SYRINGE 20 ML LL (50EA/BX 4BX/CA)

## (undated) DEVICE — VESSELOOP MAXI BLUE STERILE- SURG-I-LOOP (10EA/BX)

## (undated) DEVICE — PACK MINOR BASIN - (2EA/CA)

## (undated) DEVICE — CLIP SM INTNL HRZN TI ESCP LGT - (24EA/PK 25PK/BX)

## (undated) DEVICE — TOWELS CLOTH SURGICAL - (4/PK 20PK/CA)

## (undated) DEVICE — SUTURE 6-0 PROLENE BV-1 D/A 30 (36PK/BX)"

## (undated) DEVICE — SUTURE 3-0 SILK 12 X 18 IN - (36/BX)

## (undated) DEVICE — TRANSDUCER ADULT DISP. SINGLE BONDED STERILE - (20EA/CA)

## (undated) DEVICE — GOWN SURGEONS X-LARGE - DISP. (30/CA)

## (undated) DEVICE — DRAPE MAGNETIC (INSTRA-MAG) - (30/CA)

## (undated) DEVICE — PAD LAP STERILE 18 X 18 - (5/PK 40PK/CA)

## (undated) DEVICE — KIT ANESTHESIA W/CIRCUIT & 3/LT BAG W/FILTER (20EA/CA)

## (undated) DEVICE — DRAPE IOBAN II INCISE 23X17 - (10EA/BX 4BX/CA)

## (undated) DEVICE — SUTURE CV

## (undated) DEVICE — SPONGE PEANUT - (5/PK 50PK/CA)

## (undated) DEVICE — PROTECTOR ULNA NERVE - (36PR/CA)

## (undated) DEVICE — DERMABOND ADVANCED - (12EA/BX)

## (undated) DEVICE — CLIP MED INTNL HRZN TI ESCP - (25/BX)

## (undated) DEVICE — GLOVE BIOGEL SZ 7.5 SURGICAL PF LTX - (50PR/BX 4BX/CA)

## (undated) DEVICE — ELECTRODE 850 FOAM ADHESIVE - HYDROGEL RADIOTRNSPRNT (50/PK)

## (undated) DEVICE — DRESSING TRANSPARENT FILM TEGADERM 4 X 4.75" (50EA/BX)"

## (undated) DEVICE — CANISTER SUCTION 3000ML MECHANICAL FILTER AUTO SHUTOFF MEDI-VAC NONSTERILE LF DISP  (40EA/CA)

## (undated) DEVICE — SUTURE GENERAL

## (undated) DEVICE — DRAPE LARGE 3 QUARTER - (20/CA)

## (undated) DEVICE — HEAD HOLDER JUNIOR/ADULT

## (undated) DEVICE — WIPE INSTRUMENT MICROWIPE (20EA/SP)

## (undated) DEVICE — SLEEVE, VASO, THIGH, MED

## (undated) DEVICE — SET LEADWIRE 5 LEAD BEDSIDE DISPOSABLE ECG (1SET OF 5/EA)

## (undated) DEVICE — CHLORAPREP 26 ML APPLICATOR - ORANGE TINT(25/CA)

## (undated) DEVICE — BLADE SURGICAL #11 - (50/BX)

## (undated) DEVICE — MASK ANESTHESIA ADULT  - (100/CA)

## (undated) DEVICE — SYRINGE SAFETY TB 1 ML 27 GA X 1/2 IN (100/BX 4BX/CA)

## (undated) DEVICE — TUBE E-T HI-LO CUFF 7.0MM (10EA/PK)

## (undated) DEVICE — KIT RADIAL ARTERY 20GA W/MAX BARRIER AND BIOPATCH  (5EA/CA) #10740 IS FOR THE SET RADIAL ARTERIAL

## (undated) DEVICE — SHEET THYROID - (10EA/CA)

## (undated) DEVICE — GLOVE BIOGEL INDICATOR SZ 6.5 SURGICAL PF LTX - (50PR/BX 4BX/CA)

## (undated) DEVICE — TUBING CLEARLINK DUO-VENT - C-FLO (48EA/CA)

## (undated) DEVICE — SOD. CHL. INJ. 0.9% 1000 ML - (14EA/CA 60CA/PF)

## (undated) DEVICE — SUTURE 4-0 SILK 12 X 18 INCH - (36/BX)

## (undated) DEVICE — ELECTRODE DUAL RETURN W/ CORD - (50/PK)

## (undated) DEVICE — SODIUM CHL. INJ. 0.9% 500ML (24EA/CA 50CA/PF)

## (undated) DEVICE — DECANTER FLD BLS - (50/CA)

## (undated) DEVICE — SENSOR SPO2 NEO LNCS ADHESIVE (20/BX) SEE USER NOTES

## (undated) DEVICE — CONTAINER SPECIMEN BAG OR - STERILE 4 OZ W/LID (100EA/CA)

## (undated) DEVICE — GLOVE BIOGEL SZ 6.5 SURGICAL PF LTX (50PR/BX 4BX/CA)

## (undated) DEVICE — SUTURE 2-0 VICRYL PLUS CT-1 - 8 X 18 INCH(12/BX)

## (undated) DEVICE — GOWN WARMING STANDARD FLEX - (30/CA)

## (undated) DEVICE — SUTURE 4-0 MONOCRYL PLUS PS-1 - 27 INCH (36/BX)

## (undated) DEVICE — BLADE SURGICAL #15 - (50/BX 3BX/CA)

## (undated) DEVICE — NEPTUNE 4 PORT MANIFOLD - (20/PK)

## (undated) DEVICE — KIT SURGIFLO W/OUT THROMBIN - (6EA/CA)